# Patient Record
Sex: MALE | Race: WHITE | Employment: OTHER | URBAN - METROPOLITAN AREA
[De-identification: names, ages, dates, MRNs, and addresses within clinical notes are randomized per-mention and may not be internally consistent; named-entity substitution may affect disease eponyms.]

---

## 2017-01-20 ENCOUNTER — OFFICE VISIT (OUTPATIENT)
Dept: CARDIOLOGY CLINIC | Age: 82
End: 2017-01-20

## 2017-01-20 VITALS
WEIGHT: 134 LBS | HEIGHT: 67 IN | HEART RATE: 70 BPM | DIASTOLIC BLOOD PRESSURE: 62 MMHG | SYSTOLIC BLOOD PRESSURE: 130 MMHG | BODY MASS INDEX: 21.03 KG/M2

## 2017-01-20 DIAGNOSIS — I25.10 CORONARY ARTERY DISEASE INVOLVING NATIVE CORONARY ARTERY OF NATIVE HEART WITHOUT ANGINA PECTORIS: Primary | Chronic | ICD-10-CM

## 2017-01-20 DIAGNOSIS — R06.02 SOB (SHORTNESS OF BREATH): ICD-10-CM

## 2017-01-20 DIAGNOSIS — E78.00 PURE HYPERCHOLESTEROLEMIA: Chronic | ICD-10-CM

## 2017-01-20 NOTE — PROGRESS NOTES
Maria De Jesus Rojo MD. [facility]              Patient: Justin Pham  : 8/15/1923      Today's Date: 2017          HISTORY OF PRESENT ILLNESS:     History of Present Illness:  Mr. Narcisa Palma is here for follow-up. He has had a balance problem at times since October and was asked to use cane by PCP. Because of this, he is walking less. He has fallen down, but denies any syncope. No CP. Chronic MATA. Denies dizziness. Home BP looks good. PAST MEDICAL HISTORY:     Past Medical History   Diagnosis Date    BPH (benign prostatic hyperplasia) 2010    CAD (coronary artery disease)     [facility]ot[facility] 2011    Esophageal reflux 2010    Gastritis 2010    Hiatal hernia 2010    IBS (irritable bowel syndrome)     Melanoma ([facility] 2010    Pure hypercholesterolemia 2010    S/P CABG (coronary artery bypass graft)     Tooth fracture          Past Surgical History   Procedure Laterality Date    Hx heent       melanoma surgery x2    Pr abdomen surgery proc unlisted       hernia, right inguinal    Hx heart catheterization        Left ventricular wall motion is mild hypokinesis of the anterior wall. Ejection Fraction is estimated at 55%. The Coronary Angiography revealed:. LAD 80% stenosis. OM1 80% stenosis. RCA >90% stenosis.  Hx coronary artery bypass graft       LIMA to LAD, SVG to diagonal, SVG to trifurcation vessel and SVG to obtuse marginal.     Echo 2d adult       normal LV wall motion and ejection fraction, left atrial enlargement, mild aortic regurgitation, mild to moderate mitral regurgitation and mild tricuspid regurgitation. The ejection fraction was 55-60%.      Stress test - gxt  2011     WNL    Duplex carotid bilateral  2011     Mild bilateral disease    Hx other surgical       Treadmill stress test 12 - walked 6:43, no chest pain, (8.0 METS); stopped for SOB; no ischemic EKG changes, frequent PVC's including in couplets. Also one brief run of NSVT (7 beats) during stage 2.     Hx other surgical       Carotid dopplers 7/12  show 10-49% stenosis bilat.  Hx other surgical       Exercise cardiolite 8/10/12 - walked 7 min without chest pain; EKG with anteroseptal infarct age undetermined; no ischemic EKG changes; short runs (7 beat) of NSVT during exercise. Normal myocardial perfusion and function. LVEF 69%     Hx other surgical       Carotid duplex 6/20/14 - mild 10-49% stenosis bilat            MEDICATIONS:     Current Outpatient Prescriptions   Medication Sig Dispense Refill    oxybutynin (DITROPAN) 5 mg tablet       finasteride (PROSCAR) 5 mg tablet Take 1 Tab by mouth daily. 1 Tab 0    furosemide (LASIX) 20 mg tablet Take 0.5 Tabs by mouth as needed. Take half a tablet as needed for weight gain of 3 lbs in one day or 5 lbs in a week. 10 Tab 3    simvastatin (ZOCOR) 10 mg tablet Take 1 Tab by mouth nightly. 90 Tab 3    colestipol (COLESTID) 1 gram tablet TAKE ONE TABLET BY MOUTH TWICE DAILY 60 Tab 11    silodosin (RAPAFLO) 8 mg capsule Take 1 Cap by mouth nightly. 90 Cap 5    atenolol (TENORMIN) 25 mg tablet Take 0.5 Tabs by mouth daily. 45 Tab 3    food supplemt, lactose-reduced (BOOST HIGH PROTEIN) liqd Take 237 mL by mouth three (3) times daily.  albuterol (PROAIR HFA) 90 mcg/actuation inhaler Take 1 Puff by inhalation every four (4) hours as needed for Wheezing (or coughing spasms). 1 Inhaler 2    NITROSTAT 0.4 mg SL tablet PLACE 1 TABLET SUBLINGUAL EVERY 5 MINUTES AS NEEDED 25 Tab 1    LACTOBACILLUS RHAMNOSUS GG (PROBIOTIC PO) Take  by mouth daily.  Cholecalciferol, Vitamin D3, (VITAMIN D) 1,000 unit Cap Take  by mouth daily.  aspirin delayed-release 81 mg tablet Take 81 mg by mouth daily.  cyanocobalamin (VITAMIN B-12) 1,000 mcg tablet Take 1,000 mcg by mouth daily.          No Known Allergies        SOCIAL HISTORY:     Social History   Substance Use Topics    Smoking status: Never Smoker    Smokeless tobacco: Never Used    Alcohol use 0.0 oz/week     0 Standard drinks or equivalent per week      Comment: Occasionally         FAMILY HISTORY:     Family History   Problem Relation Age of Onset    Heart Disease Mother     Heart Disease Father           REVIEW OF SYSTEMS:        Review of Systems:    Constitutional: Negative for fever, chills    HEENT: Negative for nosebleeds    Respiratory: No SOB  Cardiovascular: Negative for syncope, orthopnea, and PND.    Gastrointestinal: Negative for melena.    Genitourinary: Negative for dysuria    Musculoskeletal: Negative for myalgias.    Skin: Negative for rash    Heme: No problems bleeding.    Neurological: Negative for speech change and focal weakness.    + IBS          PHYSICAL EXAM:        Physical Exam:   Visit Vitals    /62    Pulse 70    Ht 5' 7\" (1.702 m)    Wt 134 lb (60.8 kg)    BMI 20.99 kg/m2      Patient appears generally well, mood and affect are appropriate and pleasant.    HEENT: Normocephalic, atraumatic. Sclera anicteric. Hearing intact  Neck Exam: Supple, no JVD sitting up. + left neck bruit    Lung Exam: Clear to auscultation, even breath sounds.    Cardiac Exam: Regular rate and rhythm with no murmur    Abdomen: Soft, non-tender, normal bowel sounds.   Extremities: no lower extremity edema.  MAW.    Psych - appropriate affect  Neuro - non focal             LABS / OTHER STUDIES:        Lab Results   Component Value Date/Time    Sodium 143 12/12/2016 08:50 AM    Potassium 4.2 12/12/2016 08:50 AM    Chloride 103 12/12/2016 08:50 AM    CO2 23 12/12/2016 08:50 AM    Anion gap 9 09/14/2010 08:42 AM    Glucose 93 12/12/2016 08:50 AM    BUN 18 12/12/2016 08:50 AM    Creatinine 0.79 12/12/2016 08:50 AM    BUN/Creatinine ratio 23 12/12/2016 08:50 AM    GFR est AA 89 12/12/2016 08:50 AM    GFR est non-AA 77 12/12/2016 08:50 AM    Calcium 9.0 12/12/2016 08:50 AM    Bilirubin, total 0.3 12/12/2016 08:50 AM    ALT 9 12/12/2016 08:50 AM    AST 14 12/12/2016 08:50 AM    Alk. phosphatase 86 12/12/2016 08:50 AM    Protein, total 6.7 12/12/2016 08:50 AM    Albumin 3.9 12/12/2016 08:50 AM    Globulin 3.4 09/14/2010 08:42 AM    A-G Ratio 1.4 12/12/2016 08:50 AM     Lab Results   Component Value Date/Time    WBC 6.5 12/12/2016 08:50 AM    HGB 11.4 12/12/2016 08:50 AM    HCT 34.7 12/12/2016 08:50 AM    PLATELET 626 28/94/8443 08:50 AM    MCV 94 12/12/2016 08:50 AM     Lab Results   Component Value Date/Time    Cholesterol, total 131 12/12/2016 08:50 AM    HDL Cholesterol 41 12/12/2016 08:50 AM    LDL, calculated 61 12/12/2016 08:50 AM    VLDL, calculated 29 12/12/2016 08:50 AM    Triglyceride 145 12/12/2016 08:50 AM    CHOL/HDL Ratio 3.7 09/14/2010 08:42 AM     Lab Results   Component Value Date/Time    TSH 5.110 04/07/2016 08:24 AM                 CARDIAC DIAGNOSTICS:        Cardiac Evaluation Includes:  EKG 6/20/14 - sinus bradycardia, 1st degree AV block ()    EKG 1/8/15 - NSR, 1st degree AVB, PRWP, PVC  EKG 1/12/16 - NSR, 1st degree AV block, old anterior infarct   EKG 1/20/17 - NSR, 1st degree AVB, possible old septal infarct      Carotid Doppler 6/14 - 10-49% stenosis bilat    Holter 7/14 - NSR, some PAC and PVC's  Echo 12/3/15 - LVEF 65 %. Grade 1 diastolic dysfunction. RV size upper normal. Mild LAE. Mild-mod MR. Mild AI. Mod TR. RVSP 47.       CXR 11/23/15 - IMPRESSION: Mild pulmonary interstitial edema. No evidence of pneumonia.            ASSESSMENT AND PLAN:        Assessment and Plan:    1) Prior Cough and abnormal CXR (Mild pulmonary interstitial edema. No evidence of pneumonia).    - Besides a cough which is getting better, he says he feet OK. Denies orthopnea.    - Echo 12/3/15 - LVEF 65 %. Grade 1 diastolic dysfunction. RV size upper normal. Mild LAE. Mild-mod MR. Mild AI.  Mod TR. RVSP 47.    - He was taking lasix just once every 7 days (he could not tolerate higher dose due to dizziness) in past  - On 7/14/16, he is no longer taking an lasix. I asked him to take lasix PRN.   - On 1/20/17 he says he is doing better and says he coughs infrequently. He has not taken lasix recently.    2) CAD -    - Mr. Osiel Justin denies any anginal complaints    - Continue medical management of CAD      3) Mild Carotid disease  - Carotid duplex 6/20/14 - mild 10-49% stenosis bilat    - on a statin and ASA     4) Dyslipidemia  - LDL acceptable on very low dose simvastatin   - Mr. Osiel Justin says he is tolerating the statin     5) HTN    - BP OK on low dose atenolol   - continue meds      6) RTC in 6 months.  Patient expressed understanding of the plan - questions were answered.       On a side note he was in the [employer]el during [facility] serving in [address] [place] [address] [place], MD, [place] [address] [place] [address], [address]  [address] [place] [address] Drive.  [address], [address] [place] [address]  Ph: [phone]   Ph [phone]

## 2017-01-20 NOTE — PATIENT INSTRUCTIONS

## 2017-02-20 DIAGNOSIS — I25.10 CORONARY ARTERY DISEASE INVOLVING NATIVE CORONARY ARTERY OF NATIVE HEART WITHOUT ANGINA PECTORIS: Chronic | ICD-10-CM

## 2017-02-20 RX ORDER — ATENOLOL 25 MG/1
12.5 TABLET ORAL DAILY
Qty: 45 TAB | Refills: 3 | Status: SHIPPED | OUTPATIENT
Start: 2017-02-20 | End: 2017-02-23

## 2017-02-23 DIAGNOSIS — I25.10 CORONARY ARTERY DISEASE INVOLVING NATIVE CORONARY ARTERY OF NATIVE HEART WITHOUT ANGINA PECTORIS: Chronic | ICD-10-CM

## 2017-02-23 RX ORDER — ATENOLOL 25 MG/1
TABLET ORAL
Qty: 45 TAB | Refills: 3 | Status: SHIPPED | OUTPATIENT
Start: 2017-02-23 | End: 2018-02-23 | Stop reason: SDUPTHER

## 2017-04-12 ENCOUNTER — OFFICE VISIT (OUTPATIENT)
Dept: FAMILY MEDICINE CLINIC | Age: 82
End: 2017-04-12

## 2017-04-12 VITALS
TEMPERATURE: 96.2 F | DIASTOLIC BLOOD PRESSURE: 64 MMHG | BODY MASS INDEX: 20.59 KG/M2 | RESPIRATION RATE: 22 BRPM | WEIGHT: 131.2 LBS | SYSTOLIC BLOOD PRESSURE: 136 MMHG | HEIGHT: 67 IN | HEART RATE: 68 BPM | OXYGEN SATURATION: 98 %

## 2017-04-12 DIAGNOSIS — K21.9 GASTROESOPHAGEAL REFLUX DISEASE WITHOUT ESOPHAGITIS: Chronic | ICD-10-CM

## 2017-04-12 DIAGNOSIS — K44.9 HIATAL HERNIA: Chronic | ICD-10-CM

## 2017-04-12 DIAGNOSIS — D64.9 ANEMIA, UNSPECIFIED TYPE: Chronic | ICD-10-CM

## 2017-04-12 DIAGNOSIS — D64.9 CHRONIC ANEMIA: Chronic | ICD-10-CM

## 2017-04-12 DIAGNOSIS — N13.9 URINARY OBSTRUCTION, UNSPECIFIED: Chronic | ICD-10-CM

## 2017-04-12 DIAGNOSIS — I25.10 CORONARY ARTERY DISEASE INVOLVING NATIVE CORONARY ARTERY OF NATIVE HEART WITHOUT ANGINA PECTORIS: Primary | Chronic | ICD-10-CM

## 2017-04-12 DIAGNOSIS — E78.00 PURE HYPERCHOLESTEROLEMIA: Chronic | ICD-10-CM

## 2017-04-12 NOTE — PROGRESS NOTES
Progress Note    Patient: Noah Simmonds MRN: [id number]  SSN: [id number]    YOB: 1923  Age: 80 y.o. Sex: male        Chief Complaint   Patient presents with    Cholesterol Problem   [facility]    Annual Wellness Visit     due         Subjective:     Encounter Diagnoses   Name Primary?  Coronary artery disease involving native coronary artery of native heart without angina pectoris: No chest pain, MATA or SOB. No PND or orthopnea. Yes    Gastroesophageal reflux disease without esophagitis:  Current control of Symptoms:good  Hiatal Hernia:no  Current Medications: As needed treatment  The patient has no history melena or bright red blood in the stools. The patient avoids high dose aspirin and NSAID therapy. The patient is aware of diet changes needed, elevating the head of the bed and appropriate use of antacids.  Pure hypercholesterolemia:  Cardiovascular risks for him are: LDL goal is under 80  hypertension  hyperlipidemia. Currently he takes Zocor (simvastatin) , 10 mg. Lab Results   Component Value Date/Time    Cholesterol, total 131 12/12/2016 08:50 AM    HDL Cholesterol 41 12/12/2016 08:50 AM    LDL, calculated 61 12/12/2016 08:50 AM    Triglyceride 145 12/12/2016 08:50 AM    CHOL/HDL Ratio 3.7 09/14/2010 08:42 AM     Lab Results   Component Value Date/Time    ALT (SGPT) 9 12/12/2016 08:50 AM    AST (SGOT) 14 12/12/2016 08:50 AM    Alk. phosphatase 86 12/12/2016 08:50 AM    Bilirubin, total 0.3 12/12/2016 08:50 AM      Myalgias: No   Fatigue: No   Other side effects: no  Wt Readings from Last 3 Encounters:   04/12/17 131 lb 3.2 oz (59.5 kg)   01/20/17 134 lb (60.8 kg)   12/12/16 137 lb (62.1 kg)     The patient is aware of our goal to reduce or eliminate the long term problems (such as strokes and heart attacks) related to poorly controlled hyperlipidemia.  Anemia, unspecified type:ACD vs MDS  No sx.   Lab Results   Component Value Date/Time    HGB 11.4 12/12/2016 08:50 AM     Lab Results   Component Value Date/Time    Iron 48 11/09/2015 10:07 AM    TIBC 293 11/09/2015 10:07 AM    Iron % saturation 16 11/09/2015 10:07 AM    Ferritin 104 05/17/2013 08:07 AM          Hiatal hernia: controlled sx       Chronic anemia: see above       Urinary obstruction, unspecified: He is seeing Dr. Loki Phelps for urinary retention. He had an ultrasound done at [facility] this week. He is to see Dr. Loki Phelps next week. Current and past medical information:    Current Medications after this visit[de-identified]   Current Outpatient Prescriptions   Medication Sig    atenolol (TENORMIN) 25 mg tablet TAKE 1/2 TABLET DAILY    oxybutynin (DITROPAN) 5 mg tablet     simvastatin (ZOCOR) 10 mg tablet Take 1 Tab by mouth nightly.  colestipol (COLESTID) 1 gram tablet TAKE ONE TABLET BY MOUTH TWICE DAILY    silodosin (RAPAFLO) 8 mg capsule Take 1 Cap by mouth nightly.  food supplemt, lactose-reduced (BOOST HIGH PROTEIN) liqd Take 237 mL by mouth three (3) times daily.  albuterol (PROAIR HFA) 90 mcg/actuation inhaler Take 1 Puff by inhalation every four (4) hours as needed for Wheezing (or coughing spasms).  NITROSTAT 0.4 mg SL tablet PLACE 1 TABLET SUBLINGUAL EVERY 5 MINUTES AS NEEDED    LACTOBACILLUS RHAMNOSUS GG (PROBIOTIC PO) Take  by mouth daily.  Cholecalciferol, Vitamin D3, (VITAMIN D) 1,000 unit Cap Take  by mouth daily.  aspirin delayed-release 81 mg tablet Take 81 mg by mouth daily.  cyanocobalamin (VITAMIN B-12) 1,000 mcg tablet Take 1,000 mcg by mouth daily.  finasteride (PROSCAR) 5 mg tablet Take 1 Tab by mouth daily.  furosemide (LASIX) 20 mg tablet Take 0.5 Tabs by mouth as needed. Take half a tablet as needed for weight gain of 3 lbs in one day or 5 lbs in a week. No current facility-administered medications for this visit.         Patient Active Problem List    Diagnosis Date Noted    PAC (premature atrial contraction) 07/29/2014    Chronic anemia 02/05/2014    GERD (gastroesophageal reflux disease) 05/17/2012    Carotid artery disease ([facility] [address] 07/19/2011    Anemia 09/15/2010    Pure hypercholesterolemia 05/12/2010    Esophageal reflux 05/12/2010    BPH (benign prostatic hyperplasia) 05/12/2010    Gastritis 05/12/2010    CAD (coronary artery disease) 05/12/2010    Hiatal hernia 05/12/2010    Melanoma ([facility] [address] 05/12/2010       Past Medical History:   Diagnosis Date    BPH (benign prostatic hyperplasia) 5/12/2010    CAD (coronary artery disease)     Carotid artery disease ([facility] [address] 7/19/2011    Esophageal reflux 5/12/2010    Gastritis 5/12/2010    Hiatal hernia 5/12/2010    IBS (irritable bowel syndrome)     [facility]12[facility]    Pure hypercholesterolemia 5/12/2010    S/P CABG (coronary artery bypass graft)     Tooth fracture 2/14       No Known Allergies    Past Surgical History:   Procedure Laterality Date    ABDOMEN SURGERY PROC UNLISTED  1990    hernia, right inguinal    DUPLEX CAROTID BILATERAL  7/2011    Mild bilateral disease    ECHO 2D ADULT  2010    normal LV wall motion and ejection fraction, left atrial enlargement, mild aortic regurgitation, mild to moderate mitral regurgitation and mild tricuspid regurgitation. The ejection fraction was 55-60%.  HX CORONARY ARTERY BYPASS GRAFT  1995    LIMA to LAD, SVG to diagonal, SVG to trifurcation vessel and SVG to obtuse marginal.     HX HEART CATHETERIZATION  1995     Left ventricular wall motion is mild hypokinesis of the anterior wall. Ejection Fraction is estimated at 55%. The Coronary Angiography revealed:. LAD 80% stenosis. OM1 80% stenosis. RCA >90% stenosis.  HX HEENT      melanoma surgery x2    HX OTHER SURGICAL      Treadmill stress test 7/26/12 - walked 6:43, no chest pain, (8.0 METS); stopped for SOB; no ischemic EKG changes, frequent PVC's including in couplets.  Also one brief run of NSVT (7 beats) during stage 2.     HX OTHER SURGICAL      Carotid dopplers 7/12 show 10-49% stenosis bilat.  HX OTHER SURGICAL      Exercise cardiolite 8/10/12 - walked 7 min without chest pain; EKG with anteroseptal infarct age undetermined; no ischemic EKG changes; short runs (7 beat) of NSVT during exercise. Normal myocardial perfusion and function. LVEF 69%     HX OTHER SURGICAL      Carotid duplex 6/20/14 - mild 10-49% stenosis bilat     STRESS TEST - GXT  7/2011    WNL       Social History     Social History    Marital status:      Spouse name: N/A    Number of children: N/A    Years of education: N/A     Social History Main Topics    Smoking status: Never Smoker    Smokeless tobacco: Never Used    Alcohol use 0.0 oz/week     0 Standard drinks or equivalent per week      Comment: Occasionally    Drug use: No    Sexual activity: Not Asked     Other Topics Concern    None     Social History Narrative       Review of Systems   Constitutional: Negative. Negative for chills, fever, malaise/fatigue and weight loss. He continues to decline. Is walking with a cane with very short shuffling steps. HENT: Negative. Negative for hearing loss. Eyes: Negative. Negative for blurred vision and double vision. Respiratory: Negative. Negative for cough, hemoptysis, sputum production and shortness of breath. Cardiovascular: Negative. Negative for chest pain, palpitations and orthopnea. Gastrointestinal: Negative. Negative for abdominal pain, blood in stool, heartburn, nausea and vomiting. Genitourinary: Negative. Negative for dysuria, frequency and urgency. Urinary frequency and incomplete bladder emptying by history   Musculoskeletal: Negative. Negative for back pain, myalgias and neck pain. Skin: Negative. Negative for rash. Neurological: Negative. Negative for dizziness, tingling, tremors, weakness and headaches. Endo/Heme/Allergies: Negative. Psychiatric/Behavioral: Negative. Negative for depression.         Objective:     Vitals: 04/12/17 0811 04/12/17 0816 04/12/17 0835 04/12/17 0836   BP: 166/69 156/58 155/66 136/64   Pulse: 68 68     Resp: 22      Temp: 96.2 °F (35.7 °C)      TempSrc: Oral      SpO2: 98%      Weight: 131 lb 3.2 oz (59.5 kg)      Height: 5' 7\" (1.702 m)         Body mass index is 20.55 kg/(m^2). Physical Exam   Constitutional: He is oriented to person, place, and time and well-developed, well-nourished, and in no distress. HENT:   Head: Normocephalic and atraumatic. Mouth/Throat: Oropharynx is clear and moist.   Decreased hearing   Eyes: Right eye exhibits no discharge. Left eye exhibits no discharge. No scleral icterus. Neck: No tracheal deviation present. No thyromegaly present. No bruit. Cardiovascular: Normal rate, regular rhythm and normal heart sounds. Pulmonary/Chest: Effort normal and breath sounds normal.   Abdominal: Soft. Neurological: He is alert and oriented to person, place, and time. Abnormal gait as described   Skin: No rash noted. No erythema. Psychiatric: Mood and affect normal.   Nursing note and vitals reviewed. Health Maintenance Due   Topic Date Due    MEDICARE YEARLY EXAM  04/07/2017         Assessment and orders:       ICD-10-CM ICD-9-CM    1. Coronary artery disease involving native coronary artery of native heart without angina pectoris-stable   S56.58 644.25 METABOLIC PANEL, COMPREHENSIVE      LIPID PANEL   2. Gastroesophageal reflux disease without esophagitis-stable    K21.9 530.81 CBC W/O DIFF   3. Pure hypercholesterolemia-retest  T88.19 948.1 METABOLIC PANEL, COMPREHENSIVE      LIPID PANEL   4. Anemia, unspecified type-retest  D64.9 285.9 CBC W/O DIFF      IRON PROFILE   5. Hiatal hernia-no symptoms    K44.9 553.3    6. Chronic anemia-stable    D64.9 285.9 CBC W/O DIFF   7. Urinary obstruction, unspecified-new problem  N13.9 599.60          Plan of care:  Discussed diagnoses in detail with patient.      Medication risks/benefits/side effects discussed with patient. All of the patient's questions were addressed. The patient understands and agrees with our plan of care. The patient knows to call back if they are unsure of or forget any changes we discussed today or if the symptoms change. The patient received an After-Visit Summary which contains VS, orders, medication list and allergy list. This can be used as a \"mini-medical record\" should they have to seek medical care while out of town. Patient Care Team:  Ellis Smith MD as PCP - General  Freddie Cordon MD as Physician (Dermatology)  Bridgette Mayes MD as Physician (Gastroenterology)  Kate Rangel MD (Cardiology)  Mukul Cross MD (Urology)    Follow-up Disposition:  Return for . due for medicare wellness visit, -Due for a Medicare Wellness Visit.     Future Appointments  Date Time Provider Taisha More   7/21/2017 10:40 AM Debbie Mendoza MD [address] [place] [address]       Signed By: Ellis Smith MD     April 12, 2017

## 2017-04-12 NOTE — MR AVS SNAPSHOT
Visit Information Date & Time Provider Department Dept. Phone Encounter #  
 4/12/2017  8:00 AM King Simmons MD [address] [place] [address] [phone] [id number] Follow-up Instructions Return for . due for medicare wellness visit. Your Appointments 7/21/2017 10:40 AM  
ESTABLISHED PATIENT with Pepe Ruelas MD  
[facility] ([facility]) Appt Note: 6 mo fup  
 [address] [facility] 600 1007 [facility]  
54 [facility] [address] [address] Upcoming Health Maintenance Date Due  
 MEDICARE YEARLY EXAM 4/7/2017 GLAUCOMA SCREENING Q2Y 12/8/2018 DTaP/Tdap/Td series (2 - Td) 9/13/2026 Allergies as of 4/12/2017  Review Complete On: 4/12/2017 By: Judith Suárez No Known Allergies Current Immunizations  Reviewed on 12/12/2016 Name Date Influenza Vaccine 10/7/2015, 10/14/2014, 9/27/2013 Influenza Vaccine (Quad) PF 9/12/2016 Influenza Vaccine Split 10/19/2011, 10/19/2005 PPD 5/5/2009 Pneumococcal Conjugate (PCV-13) 4/30/2015 Pneumococcal Vaccine (Unspecified Type) 4/2/2010, 12/1/2000 TD Vaccine 8/25/2010, 5/15/2006, 10/22/2002 Tdap 9/13/2016 Not reviewed this visit You Were Diagnosed With   
  
 Codes Comments Coronary artery disease involving native coronary artery of native heart without angina pectoris    -  Primary ICD-10-CM: I25.10 ICD-9-CM: 414.01 Gastroesophageal reflux disease without esophagitis     ICD-10-CM: K21.9 ICD-9-CM: 530.81 Pure hypercholesterolemia     ICD-10-CM: E78.00 ICD-9-CM: 272.0 Anemia, unspecified type     ICD-10-CM: D64.9 ICD-9-CM: 285.9 Hiatal hernia     ICD-10-CM: K44.9 ICD-9-CM: 195. 3 Chronic anemia     ICD-10-CM: D64.9 ICD-9-CM: 285.9 Urinary obstruction, unspecified     ICD-10-CM: N13.9 ICD-9-CM: 599.60 Vitals BP Pulse Temp Resp Height(growth percentile) Weight(growth percentile) 156/58 68 96.2 °F (35.7 °C) (Oral) 22 5' 7\" (1.702 m) 131 lb 3.2 oz (59.5 kg) SpO2 BMI Smoking Status 98% 20.55 kg/m2 Never Smoker Vitals History BMI and BSA Data Body Mass Index Body Surface Area 20.55 kg/m 2 1.68 m 2 Preferred Pharmacy Pharmacy Name Phone [facility] [phone] Your Updated Medication List  
  
   
This list is accurate as of: 4/12/17  8:28 AM.  Always use your most recent med list.  
  
  
  
  
 albuterol 90 mcg/actuation inhaler Commonly known as:  PROAIR HFA Take 1 Puff by inhalation every four (4) hours as needed for Wheezing (or coughing spasms). aspirin delayed-release 81 mg tablet Take 81 mg by mouth daily. atenolol 25 mg tablet Commonly known as:  TENORMIN  
TAKE 1/2 TABLET DAILY  
  
 BOOST HIGH PROTEIN Liqd Generic drug:  food supplemt, lactose-reduced Take 237 mL by mouth three (3) times daily. colestipol 1 gram tablet Commonly known as:  COLESTID  
TAKE ONE TABLET BY MOUTH TWICE DAILY  
  
 finasteride 5 mg tablet Commonly known as:  PROSCAR Take 1 Tab by mouth daily. furosemide 20 mg tablet Commonly known as:  LASIX Take 0.5 Tabs by mouth as needed. Take half a tablet as needed for weight gain of 3 lbs in one day or 5 lbs in a week. NITROSTAT 0.4 mg SL tablet Generic drug:  nitroglycerin PLACE 1 TABLET SUBLINGUAL EVERY 5 MINUTES AS NEEDED  
  
 oxybutynin 5 mg tablet Commonly known as:  DITROPAN  
  
 PROBIOTIC PO Take  by mouth daily. silodosin 8 mg capsule Commonly known as:  RAPAFLO Take 1 Cap by mouth nightly. simvastatin 10 mg tablet Commonly known as:  ZOCOR Take 1 Tab by mouth nightly. VITAMIN B-12 1,000 mcg tablet Generic drug:  cyanocobalamin Take 1,000 mcg by mouth daily. VITAMIN D3 1,000 unit Cap Generic drug:  cholecalciferol Take  by mouth daily. We Performed the Following CBC W/O DIFF [90201 CPT(R)] IRON PROFILE [id number] CPT(R)] LIPID PANEL [70148 CPT(R)] METABOLIC PANEL, COMPREHENSIVE [71197 CPT(R)] Follow-up Instructions Return for . due for medicare wellness visit. Patient Instructions Learning About Coronary Artery Disease (CAD) What is coronary artery disease? Coronary artery disease (CAD) occurs when plaque builds up in the arteries that bring oxygen-rich blood to your heart. Plaque is a fatty substance made of cholesterol, calcium, and other substances in the blood. This process is called hardening of the arteries, or atherosclerosis. What happens when you have coronary artery disease? · Plaque may narrow the coronary arteries. Narrowed arteries cause poor blood flow. This can lead to angina symptoms such as chest pain or discomfort. If blood flow is completely blocked, you could have a heart attack. · You can slow CAD and reduce the risk of future problems by making changes in your lifestyle. These include quitting smoking and eating heart-healthy foods. · Treatments for CAD, along with changes in your lifestyle, can help you live a longer and healthier life. How can you prevent coronary artery disease? · Do not smoke. It may be the best thing you can do to prevent heart disease. If you need help quitting, talk to your doctor about stop-smoking programs and medicines. These can increase your chances of quitting for good. · Be active. Get at least 30 minutes of exercise on most days of the week. Walking is a good choice. You also may want to do other activities, such as running, swimming, cycling, or playing tennis or team sports. · Eat heart-healthy foods. Eat more fruits and vegetables and less foods that contain saturated and trans fats. Limit alcohol, sodium, and sweets. · Stay at a healthy weight. Lose weight if you need to. · Manage other health problems such as diabetes, high blood pressure, and high cholesterol. · Manage stress. Stress can hurt your heart. To keep stress low, talk about your problems and feelings. Don't keep your feelings hidden. · If you have talked about it with your doctor, take a low-dose aspirin every day. Aspirin can help certain people lower their risk of a heart attack or stroke. But taking aspirin isn't right for everyone, because it can cause serious bleeding. Do not start taking daily aspirin unless your doctor knows about it. How is coronary artery disease treated? · Your doctor will suggest that you make lifestyle changes. For example, your doctor may ask you to eat healthy foods, quit smoking, lose extra weight, and be more active. · You will have to take medicines. · Your doctor may suggest a procedure to open narrowed or blocked arteries. This is called angioplasty. Or your doctor may suggest using healthy blood vessels to create detours around narrowed or blocked arteries. This is called bypass surgery. Follow-up care is a key part of your treatment and safety. Be sure to make and go to all appointments, and call your doctor if you are having problems. It's also a good idea to know your test results and keep a list of the medicines you take. Where can you learn more? Go to http://vipin-josé miguel.info/. Enter (44) 0240 6817 in the search box to learn more about \"Learning About Coronary Artery Disease (CAD). \" Current as of: January 27, 2016 Content Version: 11.2 © 3915-4533 [employer]. Care instructions adapted under license by [employer] (which disclaims liability or warranty for this information). If you have questions about a medical condition or this instruction, always ask your healthcare professional. [place] [address] any warranty or liability for your use of this information. Please provide this summary of care documentation to your next provider. Your primary care clinician is listed as [address]. If you have any questions after today's visit, please call [phone].

## 2017-04-12 NOTE — PATIENT INSTRUCTIONS
Learning About Coronary Artery Disease (CAD)  What is coronary artery disease? Coronary artery disease (CAD) occurs when plaque builds up in the arteries that bring oxygen-rich blood to your heart. Plaque is a fatty substance made of cholesterol, calcium, and other substances in the blood. This process is called hardening of the arteries, or atherosclerosis. What happens when you have coronary artery disease? · Plaque may narrow the coronary arteries. Narrowed arteries cause poor blood flow. This can lead to angina symptoms such as chest pain or discomfort. If blood flow is completely blocked, you could have a heart attack. · You can slow CAD and reduce the risk of future problems by making changes in your lifestyle. These include quitting smoking and eating heart-healthy foods. · Treatments for CAD, along with changes in your lifestyle, can help you live a longer and healthier life. How can you prevent coronary artery disease? · Do not smoke. It may be the best thing you can do to prevent heart disease. If you need help quitting, talk to your doctor about stop-smoking programs and medicines. These can increase your chances of quitting for good. · Be active. Get at least 30 minutes of exercise on most days of the week. Walking is a good choice. You also may want to do other activities, such as running, swimming, cycling, or playing tennis or team sports. · Eat heart-healthy foods. Eat more fruits and vegetables and less foods that contain saturated and trans fats. Limit alcohol, sodium, and sweets. · Stay at a healthy weight. Lose weight if you need to. · Manage other health problems such as diabetes, high blood pressure, and high cholesterol. · Manage stress. Stress can hurt your heart. To keep stress low, talk about your problems and feelings. Don't keep your feelings hidden. · If you have talked about it with your doctor, take a low-dose aspirin every day.  Aspirin can help certain people lower their risk of a heart attack or stroke. But taking aspirin isn't right for everyone, because it can cause serious bleeding. Do not start taking daily aspirin unless your doctor knows about it. How is coronary artery disease treated? · Your doctor will suggest that you make lifestyle changes. For example, your doctor may ask you to eat healthy foods, quit smoking, lose extra weight, and be more active. · You will have to take medicines. · Your doctor may suggest a procedure to open narrowed or blocked arteries. This is called angioplasty. Or your doctor may suggest using healthy blood vessels to create detours around narrowed or blocked arteries. This is called bypass surgery. Follow-up care is a key part of your treatment and safety. Be sure to make and go to all appointments, and call your doctor if you are having problems. It's also a good idea to know your test results and keep a list of the medicines you take. Where can you learn more? Go to http://vipin-josé miguel.info/. Enter ([phone] 0069 3843 in the search box to learn more about \"Learning About Coronary Artery Disease (CAD). \"  Current as of: January 27, 2016  Content Version: 11.2  © 3523-2528 [employer]. Care instructions adapted under license by [employer] (which disclaims liability or warranty for this information). If you have questions about a medical condition or this instruction, always ask your healthcare professional. [place] [address] any warranty or liability for your use of this information.

## 2017-04-13 LAB
ALBUMIN SERPL-MCNC: 3.9 G/DL (ref 3.2–4.6)
ALBUMIN/GLOB SERPL: 1.2 {RATIO} (ref 1.2–2.2)
ALP SERPL-CCNC: 90 IU/L (ref 39–117)
ALT SERPL-CCNC: 11 IU/L (ref 0–44)
AST SERPL-CCNC: 13 IU/L (ref 0–40)
BILIRUB SERPL-MCNC: 0.4 MG/DL (ref 0–1.2)
BUN SERPL-MCNC: 21 MG/DL (ref 10–36)
BUN/CREAT SERPL: 27 (ref 10–24)
CALCIUM SERPL-MCNC: 9 MG/DL (ref 8.6–10.2)
CHLORIDE SERPL-SCNC: 103 MMOL/L (ref 96–106)
CHOLEST SERPL-MCNC: 124 MG/DL (ref 100–199)
CO2 SERPL-SCNC: 24 MMOL/L (ref 18–29)
CREAT SERPL-MCNC: 0.79 MG/DL (ref 0.76–1.27)
ERYTHROCYTE [DISTWIDTH] IN BLOOD BY AUTOMATED COUNT: 14.3 % (ref 12.3–15.4)
GLOBULIN SER CALC-MCNC: 3.3 G/DL (ref 1.5–4.5)
GLUCOSE SERPL-MCNC: 96 MG/DL (ref 65–99)
HCT VFR BLD AUTO: 34.1 % (ref 37.5–51)
HDLC SERPL-MCNC: 35 MG/DL
HGB BLD-MCNC: 11 G/DL (ref 12.6–17.7)
IRON SATN MFR SERPL: 20 % (ref 15–55)
IRON SERPL-MCNC: 57 UG/DL (ref 38–169)
LDLC SERPL CALC-MCNC: 55 MG/DL (ref 0–99)
MCH RBC QN AUTO: 30.5 PG (ref 26.6–33)
MCHC RBC AUTO-ENTMCNC: 32.3 G/DL (ref 31.5–35.7)
MCV RBC AUTO: 95 FL (ref 79–97)
PLATELET # BLD AUTO: 228 X10E3/UL (ref 150–379)
POTASSIUM SERPL-SCNC: 3.9 MMOL/L (ref 3.5–5.2)
PROT SERPL-MCNC: 7.2 G/DL (ref 6–8.5)
RBC # BLD AUTO: 3.61 X10E6/UL (ref 4.14–5.8)
SODIUM SERPL-SCNC: 143 MMOL/L (ref 134–144)
TIBC SERPL-MCNC: 287 UG/DL (ref 250–450)
TRIGL SERPL-MCNC: 171 MG/DL (ref 0–149)
UIBC SERPL-MCNC: 230 UG/DL (ref 111–343)
VLDLC SERPL CALC-MCNC: 34 MG/DL (ref 5–40)
WBC # BLD AUTO: 6 X10E3/UL (ref 3.4–10.8)

## 2017-05-15 DIAGNOSIS — N40.1 BENIGN PROSTATIC HYPERPLASIA WITH LOWER URINARY TRACT SYMPTOMS, UNSPECIFIED MORPHOLOGY: Chronic | ICD-10-CM

## 2017-05-15 RX ORDER — SILODOSIN 8 MG/1
8 CAPSULE ORAL
Qty: 90 CAP | Refills: 2 | Status: SHIPPED | OUTPATIENT
Start: 2017-05-15 | End: 2018-03-07 | Stop reason: SDUPTHER

## 2017-05-15 RX ORDER — OXYBUTYNIN CHLORIDE 5 MG/1
5 TABLET ORAL 2 TIMES DAILY
Qty: 60 TAB | Refills: 5 | Status: SHIPPED | OUTPATIENT
Start: 2017-05-15 | End: 2018-05-03 | Stop reason: SDUPTHER

## 2017-05-15 NOTE — TELEPHONE ENCOUNTER
Patient requesting refills on the two medications listed    He uses the [facility] mail order    Thanks    Ihsan Reza

## 2017-07-03 DIAGNOSIS — I25.10 CORONARY ARTERY DISEASE INVOLVING NATIVE CORONARY ARTERY OF NATIVE HEART WITHOUT ANGINA PECTORIS: Chronic | ICD-10-CM

## 2017-07-03 RX ORDER — MONTELUKAST SODIUM 4 MG/1
TABLET, CHEWABLE ORAL
Qty: 60 TAB | Refills: 0 | Status: SHIPPED | OUTPATIENT
Start: 2017-07-03 | End: 2017-08-11 | Stop reason: SDUPTHER

## 2017-07-17 DIAGNOSIS — I25.10 CORONARY ARTERY DISEASE INVOLVING NATIVE CORONARY ARTERY OF NATIVE HEART WITHOUT ANGINA PECTORIS: Chronic | ICD-10-CM

## 2017-07-17 RX ORDER — SIMVASTATIN 10 MG/1
10 TABLET, FILM COATED ORAL
Qty: 90 TAB | Refills: 0 | Status: SHIPPED | OUTPATIENT
Start: 2017-07-17 | End: 2017-10-16 | Stop reason: SDUPTHER

## 2017-07-21 ENCOUNTER — OFFICE VISIT (OUTPATIENT)
Dept: CARDIOLOGY CLINIC | Age: 82
End: 2017-07-21

## 2017-07-21 VITALS
BODY MASS INDEX: 20.56 KG/M2 | HEIGHT: 67 IN | HEART RATE: 60 BPM | DIASTOLIC BLOOD PRESSURE: 56 MMHG | SYSTOLIC BLOOD PRESSURE: 136 MMHG | WEIGHT: 131 LBS

## 2017-07-21 DIAGNOSIS — I25.10 CORONARY ARTERY DISEASE INVOLVING NATIVE CORONARY ARTERY OF NATIVE HEART WITHOUT ANGINA PECTORIS: Primary | Chronic | ICD-10-CM

## 2017-07-21 DIAGNOSIS — E78.00 PURE HYPERCHOLESTEROLEMIA: Chronic | ICD-10-CM

## 2017-07-21 DIAGNOSIS — I10 ESSENTIAL HYPERTENSION: ICD-10-CM

## 2017-07-21 NOTE — PROGRESS NOTES
Kary Perez MD. [facility]              Patient: Елена Raymond  : 8/15/1923      Today's Date: 2017          HISTORY OF PRESENT ILLNESS:     History of Present Illness:  Mr. Mayuri Rose is here for follow-up. Outpatient BP looks good. He is doing well. No Cp or SOB or dizziness. He says he has no complaints. PAST MEDICAL HISTORY:     Past Medical History:   Diagnosis Date    BPH (benign prostatic hyperplasia) 2010    CAD (coronary artery disease)     Carotid artery disease ([facility] [address] 2011    Esophageal reflux 2010    Gastritis 2010    Hiatal hernia 2010    IBS (irritable bowel syndrome)     Melanoma ([facility] [address] 2010    Pure hypercholesterolemia 2010    S/P CABG (coronary artery bypass graft)     Tooth fracture        Past Surgical History:   Procedure Laterality Date    ABDOMEN SURGERY PROC UNLISTED      hernia, right inguinal    DUPLEX CAROTID BILATERAL  2011    Mild bilateral disease    ECHO 2D ADULT      normal LV wall motion and ejection fraction, left atrial enlargement, mild aortic regurgitation, mild to moderate mitral regurgitation and mild tricuspid regurgitation. The ejection fraction was 55-60%.  HX CORONARY ARTERY BYPASS GRAFT      LIMA to LAD, SVG to diagonal, SVG to trifurcation vessel and SVG to obtuse marginal.     HX HEART CATHETERIZATION       Left ventricular wall motion is mild hypokinesis of the anterior wall. Ejection Fraction is estimated at 55%. The Coronary Angiography revealed:. LAD 80% stenosis. OM1 80% stenosis. RCA >90% stenosis.  HX HEENT      melanoma surgery x2    HX OTHER SURGICAL      Treadmill stress test 12 - walked 6:43, no chest pain, (8.0 METS); stopped for SOB; no ischemic EKG changes, frequent PVC's including in couplets. Also one brief run of NSVT (7 beats) during stage 2.     HX OTHER SURGICAL      Carotid dopplers   show 10-49% stenosis bilat.      HX OTHER SURGICAL Exercise cardiolite 8/10/12 - walked 7 min without chest pain; EKG with anteroseptal infarct age undetermined; no ischemic EKG changes; short runs (7 beat) of NSVT during exercise. Normal myocardial perfusion and function. LVEF 69%     HX OTHER SURGICAL      Carotid duplex 6/20/14 - mild 10-49% stenosis bilat     STRESS TEST - GXT  7/2011    WNL           MEDICATIONS:     Current Outpatient Prescriptions   Medication Sig Dispense Refill    simvastatin (ZOCOR) 10 mg tablet Take 1 Tab by mouth nightly. 90 Tab 0    colestipol (COLESTID) 1 gram tablet TAKE ONE TABLET BY MOUTH TWICE DAILY 60 Tab 0    oxybutynin (DITROPAN) 5 mg tablet Take 1 Tab by mouth two (2) times a day. 60 Tab 5    silodosin (RAPAFLO) 8 mg capsule Take 1 Cap by mouth nightly. 90 Cap 2    atenolol (TENORMIN) 25 mg tablet TAKE 1/2 TABLET DAILY 45 Tab 3    finasteride (PROSCAR) 5 mg tablet Take 1 Tab by mouth daily. 1 Tab 0    furosemide (LASIX) 20 mg tablet Take 0.5 Tabs by mouth as needed. Take half a tablet as needed for weight gain of 3 lbs in one day or 5 lbs in a week. 10 Tab 3    food supplemt, lactose-reduced (BOOST HIGH PROTEIN) liqd Take 237 mL by mouth three (3) times daily.  NITROSTAT 0.4 mg SL tablet PLACE 1 TABLET SUBLINGUAL EVERY 5 MINUTES AS NEEDED 25 Tab 1    LACTOBACILLUS RHAMNOSUS GG (PROBIOTIC PO) Take  by mouth daily.  Cholecalciferol, Vitamin D3, (VITAMIN D) 1,000 unit Cap Take  by mouth daily.  aspirin delayed-release 81 mg tablet Take 81 mg by mouth daily.  cyanocobalamin (VITAMIN B-12) 1,000 mcg tablet Take 1,000 mcg by mouth daily.  albuterol (PROAIR HFA) 90 mcg/actuation inhaler Take 1 Puff by inhalation every four (4) hours as needed for Wheezing (or coughing spasms).  1 Inhaler 2       No Known Allergies          SOCIAL HISTORY:     Social History   Substance Use Topics    Smoking status: Never Smoker    Smokeless tobacco: Never Used    Alcohol use 0.0 oz/week     0 Standard drinks or equivalent per week      Comment: Occasionally           FAMILY HISTORY:     Family History   Problem Relation Age of Onset    Heart Disease Mother     Heart Disease Father           REVIEW OF SYSTEMS:         Review of Systems:    Constitutional: Negative for fever, chills    HEENT: Negative for nosebleeds    Respiratory: No SOB  Cardiovascular: Negative for syncope, orthopnea, and PND.    Gastrointestinal: Negative for melena.    Genitourinary: Negative for dysuria    Musculoskeletal: Negative for myalgias.    Skin: Negative for rash    Heme: No problems bleeding.    Neurological: Negative for speech change and focal weakness.    + IBS           PHYSICAL EXAM:         Physical Exam:     Visit Vitals    /56    Pulse 60    Ht 5' 7\" (1.702 m)    Wt 131 lb (59.4 kg)    BMI 20.52 kg/m2     Patient appears generally well, mood and affect are appropriate and pleasant.    HEENT: Normocephalic, atraumatic. Sclera anicteric. Hearing intact  Neck Exam: Supple, no JVD sitting up. + left neck bruit    Lung Exam: Clear to auscultation, even breath sounds.    Cardiac Exam: Regular rate and rhythm with no murmur    Abdomen: Soft, non-tender, normal bowel sounds. Extremities: Trace lower extremity edema.  MAW.    Psych - appropriate affect  Neuro - non focal               LABS / OTHER STUDIES:         Lab Results   Component Value Date/Time    Sodium 143 04/12/2017 08:45 AM    Potassium 3.9 04/12/2017 08:45 AM    Chloride 103 04/12/2017 08:45 AM    CO2 24 04/12/2017 08:45 AM    Anion gap 9 09/14/2010 08:42 AM    Glucose 96 04/12/2017 08:45 AM    BUN 21 04/12/2017 08:45 AM    Creatinine 0.79 04/12/2017 08:45 AM    BUN/Creatinine ratio 27 04/12/2017 08:45 AM    GFR est AA 89 04/12/2017 08:45 AM    GFR est non-AA 77 04/12/2017 08:45 AM    Calcium 9.0 04/12/2017 08:45 AM    Bilirubin, total 0.4 04/12/2017 08:45 AM    AST (SGOT) 13 04/12/2017 08:45 AM    Alk.  phosphatase 90 04/12/2017 08:45 AM    Protein, total 7.2 04/12/2017 08:45 AM    Albumin 3.9 04/12/2017 08:45 AM    Globulin 3.4 09/14/2010 08:42 AM    A-G Ratio 1.2 04/12/2017 08:45 AM    ALT (SGPT) 11 04/12/2017 08:45 AM     Lab Results   Component Value Date/Time    WBC 6.0 04/12/2017 08:45 AM    HGB 11.0 04/12/2017 08:45 AM    HCT 34.1 04/12/2017 08:45 AM    PLATELET 070 25/42/5351 08:45 AM    MCV 95 04/12/2017 08:45 AM     Lab Results   Component Value Date/Time    Cholesterol, total 124 04/12/2017 08:45 AM    HDL Cholesterol 35 04/12/2017 08:45 AM    LDL, calculated 55 04/12/2017 08:45 AM    VLDL, calculated 34 04/12/2017 08:45 AM    Triglyceride 171 04/12/2017 08:45 AM    CHOL/HDL Ratio 3.7 09/14/2010 08:42 AM     Lab Results   Component Value Date/Time    TSH 5.110 04/07/2016 08:24 AM               CARDIAC DIAGNOSTICS:         Cardiac Evaluation Includes:  EKG 6/20/14 - sinus bradycardia, 1st degree AV block ()    EKG 1/8/15 - NSR, 1st degree AVB, PRWP, PVC  EKG 1/12/16 - NSR, 1st degree AV block, old anterior infarct   EKG 1/20/17 - NSR, 1st degree AVB, possible old septal infarct       Carotid Doppler 6/14 - 10-49% stenosis bilat    Holter 7/14 - NSR, some PAC and PVC's  Echo 12/3/15 - LVEF 65 %. Grade 1 diastolic dysfunction. RV size upper normal. Mild LAE. Mild-mod MR. Mild AI. Mod TR. RVSP 47.        CXR 11/23/15 - IMPRESSION: Mild pulmonary interstitial edema. No evidence of pneumonia.             ASSESSMENT AND PLAN:         Assessment and Plan:    1) Prior Cough and abnormal CXR (Mild pulmonary interstitial edema. No evidence of pneumonia).    - Besides a cough which is getting better, he says he feet OK. Denies orthopnea.    - Echo 12/3/15 - LVEF 65 %. Grade 1 diastolic dysfunction. RV size upper normal. Mild LAE. Mild-mod MR. Mild AI. Mod TR. RVSP 47.    - He was taking lasix just once every 7 days (he could not tolerate higher dose due to dizziness) in past  - On 7/14/16, he is no longer taking an lasix.  I asked him to take lasix PRN.   - On 1/20/17 he says he is doing better and says he coughs infrequently. He has not taken lasix recently. - On 7/21/17 - he is doing well      2) CAD -    - Mr. Dread Quiñones denies any anginal complaints    - Continue medical management of CAD       3) Mild Carotid disease  - Carotid duplex 6/20/14 - mild 10-49% stenosis bilat    - on a statin and ASA      4) Dyslipidemia  - LDL acceptable on very low dose simvastatin   - Mr. Dread Quiñones says he is tolerating the statin      5) HTN    - BP OK on low dose atenolol   - continue meds       6) RTC in 6 months.  Patient expressed understanding of the plan - questions were answered.       On a side note he was in the [facility] during [facility] serving in [place].         Randolph Santoyo MD, [address], [address]  Ph: [phone]   Ph [phone]

## 2017-07-21 NOTE — MR AVS SNAPSHOT
Visit Information Date & Time Provider Department Dept. Phone Encounter #  
 7/21/2017 10:40 AM Yoly Peter MD [facility] [phone] [id number] Upcoming Health Maintenance Date Due  
 MEDICARE YEARLY EXAM 4/7/2017 INFLUENZA AGE 9 TO ADULT 8/1/2017 GLAUCOMA SCREENING Q2Y 12/8/2018 DTaP/Tdap/Td series (2 - Td) 9/13/2026 Allergies as of 7/21/2017  Review Complete On: 7/21/2017 By: Yoly Peter MD  
 No Known Allergies Current Immunizations  Reviewed on 12/12/2016 Name Date Influenza Vaccine 10/7/2015, 10/14/2014, 9/27/2013 Influenza Vaccine (Quad) PF 9/12/2016 Influenza Vaccine Split 10/19/2011, 10/19/2005 PPD 5/5/2009 Pneumococcal Conjugate (PCV-13) 4/30/2015 Pneumococcal Vaccine (Unspecified Type) 4/2/2010, 12/1/2000 TD Vaccine 8/25/2010, 5/15/2006, 10/22/2002 Tdap 9/13/2016 Not reviewed this visit You Were Diagnosed With   
  
 Codes Comments Coronary artery disease involving native coronary artery of native heart without angina pectoris    -  Primary ICD-10-CM: I25.10 ICD-9-CM: 414.01   
 Pure hypercholesterolemia     ICD-10-CM: E78.00 ICD-9-CM: 272.0 Essential hypertension     ICD-10-CM: I10 
ICD-9-CM: 401.9 Vitals BP Pulse Height(growth percentile) Weight(growth percentile) BMI Smoking Status 136/56 60 5' 7\" (1.702 m) 131 lb (59.4 kg) 20.52 kg/m2 Never Smoker Vitals History BMI and BSA Data Body Mass Index Body Surface Area 20.52 kg/m 2 1.68 m 2 Preferred Pharmacy Pharmacy Name Phone [facility] [phone] Your Updated Medication List  
  
   
This list is accurate as of: 7/21/17 10:50 AM.  Always use your most recent med list.  
  
  
  
  
 albuterol 90 mcg/actuation inhaler Commonly known as:  PROAIR HFA Take 1 Puff by inhalation every four (4) hours as needed for Wheezing (or coughing spasms). aspirin delayed-release 81 mg tablet Take 81 mg by mouth daily. atenolol 25 mg tablet Commonly known as:  TENORMIN  
TAKE 1/2 TABLET DAILY  
  
 BOOST HIGH PROTEIN Liqd Generic drug:  food supplemt, lactose-reduced Take 237 mL by mouth three (3) times daily. colestipol 1 gram tablet Commonly known as:  COLESTID  
TAKE ONE TABLET BY MOUTH TWICE DAILY  
  
 finasteride 5 mg tablet Commonly known as:  PROSCAR Take 1 Tab by mouth daily. furosemide 20 mg tablet Commonly known as:  LASIX Take 0.5 Tabs by mouth as needed. Take half a tablet as needed for weight gain of 3 lbs in one day or 5 lbs in a week. NITROSTAT 0.4 mg SL tablet Generic drug:  nitroglycerin PLACE 1 TABLET SUBLINGUAL EVERY 5 MINUTES AS NEEDED  
  
 oxybutynin 5 mg tablet Commonly known as:  VHNPCPPA Take 1 Tab by mouth two (2) times a day. PROBIOTIC PO Take  by mouth daily. silodosin 8 mg capsule Commonly known as:  RAPAFLO Take 1 Cap by mouth nightly. simvastatin 10 mg tablet Commonly known as:  ZOCOR Take 1 Tab by mouth nightly. VITAMIN B-12 1,000 mcg tablet Generic drug:  cyanocobalamin Take 1,000 mcg by mouth daily. VITAMIN D3 1,000 unit Cap Generic drug:  cholecalciferol Take  by mouth daily. Patient Instructions Heart-Healthy Diet: Care Instructions Your Care Instructions A heart-healthy diet has lots of vegetables, fruits, nuts, beans, and whole grains, and is low in salt. It limits foods that are high in saturated fat, such as meats, cheeses, and fried foods. It may be hard to change your diet, but even small changes can lower your risk of heart attack and heart disease. Follow-up care is a key part of your treatment and safety. Be sure to make and go to all appointments, and call your doctor if you are having problems. It's also a good idea to know your test results and keep a list of the medicines you take. How can you care for yourself at home? Watch your portions · Learn what a serving is. A \"serving\" and a \"portion\" are not always the same thing. Make sure that you are not eating larger portions than are recommended. For example, a serving of pasta is ½ cup. A serving size of meat is 2 to 3 ounces. A 3-ounce serving is about the size of a deck of cards. Measure serving sizes until you are good at Maries" them. Keep in mind that restaurants often serve portions that are 2 or 3 times the size of one serving. · To keep your energy level up and keep you from feeling hungry, eat often but in smaller portions. · Eat only the number of calories you need to stay at a healthy weight. If you need to lose weight, eat fewer calories than your body burns (through exercise and other physical activity). Eat more fruits and vegetables · Eat a variety of fruit and vegetables every day. Dark green, deep orange, red, or yellow fruits and vegetables are especially good for you. Examples include spinach, carrots, peaches, and berries. · Keep carrots, celery, and other veggies handy for snacks. Buy fruit that is in season and store it where you can see it so that you will be tempted to eat it. · Cook dishes that have a lot of veggies in them, such as stir-fries and soups. Limit saturated and trans fat · Read food labels, and try to avoid saturated and trans fats. They increase your risk of heart disease. Trans fat is found in many processed foods such as cookies and crackers. · Use olive or canola oil when you cook. Try cholesterol-lowering spreads, such as Benecol or Take Control. · Bake, broil, grill, or steam foods instead of frying them. · Choose lean meats instead of high-fat meats such as hot dogs and sausages. Cut off all visible fat when you prepare meat. · Eat fish, skinless poultry, and meat alternatives such as soy products instead of high-fat meats.  Soy products, such as tofu, may be especially good for your heart. · Choose low-fat or fat-free milk and dairy products. Eat fish · Eat at least two servings of fish a week. Certain fish, such as salmon and tuna, contain omega-3 fatty acids, which may help reduce your risk of heart attack. Eat foods high in fiber · Eat a variety of grain products every day. Include whole-grain foods that have lots of fiber and nutrients. Examples of whole-grain foods include oats, whole wheat bread, and brown rice. · Buy whole-grain breads and cereals, instead of white bread or pastries. Limit salt and sodium · Limit how much salt and sodium you eat to help lower your blood pressure. · Taste food before you salt it. Add only a little salt when you think you need it. With time, your taste buds will adjust to less salt. · Eat fewer snack items, fast foods, and other high-salt, processed foods. Check food labels for the amount of sodium in packaged foods. · Choose low-sodium versions of canned goods (such as soups, vegetables, and beans). Limit sugar · Limit drinks and foods with added sugar. These include candy, desserts, and soda pop. Limit alcohol · Limit alcohol to no more than 2 drinks a day for men and 1 drink a day for women. Too much alcohol can cause health problems. When should you call for help? Watch closely for changes in your health, and be sure to contact your doctor if: 
· You would like help planning heart-healthy meals. Where can you learn more? Go to http://vipin-josé miguel.info/. Enter V137 in the search box to learn more about \"Heart-Healthy Diet: Care Instructions. \" Current as of: April 3, 2017 Content Version: 11.3 © 0826-7763 [employer]. Care instructions adapted under license by [employer] (which disclaims liability or warranty for this information).  If you have questions about a medical condition or this instruction, always ask your healthcare professional. [employer] disclaims any warranty or liability for your use of this information. Please provide this summary of care documentation to your next provider. Your primary care clinician is listed as [address]. If you have any questions after today's visit, please call [phone].

## 2017-07-21 NOTE — PATIENT INSTRUCTIONS

## 2017-08-29 ENCOUNTER — OFFICE VISIT (OUTPATIENT)
Dept: FAMILY MEDICINE CLINIC | Age: 82
End: 2017-08-29

## 2017-08-29 VITALS
SYSTOLIC BLOOD PRESSURE: 133 MMHG | WEIGHT: 128.2 LBS | RESPIRATION RATE: 16 BRPM | HEART RATE: 74 BPM | HEIGHT: 67 IN | BODY MASS INDEX: 20.12 KG/M2 | OXYGEN SATURATION: 97 % | TEMPERATURE: 97.5 F | DIASTOLIC BLOOD PRESSURE: 54 MMHG

## 2017-08-29 DIAGNOSIS — Z23 ENCOUNTER FOR IMMUNIZATION: ICD-10-CM

## 2017-08-29 DIAGNOSIS — D64.9 CHRONIC ANEMIA: Chronic | ICD-10-CM

## 2017-08-29 DIAGNOSIS — K21.9 GASTROESOPHAGEAL REFLUX DISEASE WITHOUT ESOPHAGITIS: Chronic | ICD-10-CM

## 2017-08-29 DIAGNOSIS — K44.9 HIATAL HERNIA: Chronic | ICD-10-CM

## 2017-08-29 DIAGNOSIS — E78.00 PURE HYPERCHOLESTEROLEMIA: Primary | Chronic | ICD-10-CM

## 2017-08-29 DIAGNOSIS — I25.10 CORONARY ARTERY DISEASE INVOLVING NATIVE CORONARY ARTERY OF NATIVE HEART WITHOUT ANGINA PECTORIS: Chronic | ICD-10-CM

## 2017-08-29 RX ORDER — BISACODYL 5 MG
5 TABLET, DELAYED RELEASE (ENTERIC COATED) ORAL DAILY PRN
COMMUNITY
End: 2017-09-15

## 2017-08-29 NOTE — PROGRESS NOTES
Chief Complaint   Patient presents with    Constipation    Labs     Body mass index is 20.08 kg/(m^2).     Reviewed record in preparation for visit and have necessary documentation  Pt did not bring medication to office visit for review  Information was given to pt on Advanced Directives, Living Will  Information was given on Shingles Vaccine  Opportunity was given for questions  Goals that were addressed and/or need to be completed after this appointment include:     Health Maintenance Due   Topic Date Due    MEDICARE YEARLY EXAM  04/07/2017    INFLUENZA AGE 9 TO ADULT  08/01/2017

## 2017-08-29 NOTE — MR AVS SNAPSHOT
Visit Information Date & Time Provider Department Dept. Phone Encounter #  
 8/29/2017  1:25 PM Harshil Kulkarni MD [facility] [id number] Follow-up Instructions Return in about 4 months (around 12/29/2017). Your Appointments 1/25/2018 11:40 AM  
ESTABLISHED PATIENT with Manuel Milligan MD  
[facility] ([facility] SCHEDULING) Appt Note: 6 mo fup  
 [address] [facility] [address] [facility]  
[address] [facility] [id number] [place] [address] Upcoming Health Maintenance Date Due  
 MEDICARE YEARLY EXAM 4/7/2017 INFLUENZA AGE 9 TO ADULT 8/1/2017 GLAUCOMA SCREENING Q2Y 12/8/2018 DTaP/Tdap/Td series (2 - Td) 9/13/2026 Allergies as of 8/29/2017  Review Complete On: 7/21/2017 By: Manuel Milligan MD  
 No Known Allergies Current Immunizations  Reviewed on 12/12/2016 Name Date Influenza Vaccine 10/7/2015, 10/14/2014, 9/27/2013 Influenza Vaccine (Quad) PF 9/12/2016 Influenza Vaccine Split 10/19/2011, 10/19/2005 PPD 5/5/2009 Pneumococcal Conjugate (PCV-13) 4/30/2015 TD Vaccine 8/25/2010, 5/15/2006, 10/22/2002 Tdap 9/13/2016 ZZZ-RETIRED (DO NOT USE) Pneumococcal Vaccine (Unspecified Type) 4/2/2010, 12/1/2000 Not reviewed this visit You Were Diagnosed With   
  
 Codes Comments Pure hypercholesterolemia    -  Primary ICD-10-CM: E78.00 ICD-9-CM: 272.0 Gastroesophageal reflux disease without esophagitis     ICD-10-CM: K21.9 ICD-9-CM: 530.81 Chronic anemia     ICD-10-CM: D64.9 ICD-9-CM: 285.9 Coronary artery disease involving native coronary artery of native heart without angina pectoris     ICD-10-CM: I25.10 ICD-9-CM: 414.01 Hiatal hernia     ICD-10-CM: K44.9 ICD-9-CM: 901. 3 Vitals BP Pulse Temp Resp Height(growth percentile) Weight(growth percentile) 133/54 74 97.5 °F (36.4 °C) (Oral) 16 5' 7\" (1.702 m) 128 lb 3.2 oz (58.2 kg) SpO2 BMI Smoking Status 97% 20.08 kg/m2 Never Smoker Vitals History BMI and BSA Data Body Mass Index Body Surface Area 20.08 kg/m 2 1.66 m 2 Preferred Pharmacy Pharmacy Name Phone [facility] [address] [phone] Your Updated Medication List  
  
   
This list is accurate as of: 8/29/17  2:06 PM.  Always use your most recent med list.  
  
  
  
  
 albuterol 90 mcg/actuation inhaler Commonly known as:  PROAIR HFA Take 1 Puff by inhalation every four (4) hours as needed for Wheezing (or coughing spasms). aspirin delayed-release 81 mg tablet Take 81 mg by mouth daily. atenolol 25 mg tablet Commonly known as:  TENORMIN  
TAKE 1/2 TABLET DAILY  
  
 BOOST HIGH PROTEIN Liqd Generic drug:  food supplemt, lactose-reduced Take 237 mL by mouth three (3) times daily. colestipol 1 gram tablet Commonly known as:  COLESTID  
TAKE ONE TABLET BY MOUTH TWICE DAILY DULCOLAX (BISACODYL) 5 mg EC tablet Generic drug:  bisacodyl Take 5 mg by mouth daily as needed for Constipation. finasteride 5 mg tablet Commonly known as:  PROSCAR Take 1 Tab by mouth daily. furosemide 20 mg tablet Commonly known as:  LASIX Take 0.5 Tabs by mouth as needed. Take half a tablet as needed for weight gain of 3 lbs in one day or 5 lbs in a week. NITROSTAT 0.4 mg SL tablet Generic drug:  nitroglycerin PLACE 1 TABLET SUBLINGUAL EVERY 5 MINUTES AS NEEDED  
  
 oxybutynin 5 mg tablet Commonly known as:  MHQMNBHN Take 1 Tab by mouth two (2) times a day. PROBIOTIC PO Take  by mouth daily. sennosides 15 mg chewable tablet Commonly known as:  EX-LAX Take  by mouth.  
  
 silodosin 8 mg capsule Commonly known as:  RAPAFLO Take 1 Cap by mouth nightly. simvastatin 10 mg tablet Commonly known as:  ZOCOR Take 1 Tab by mouth nightly. VITAMIN B-12 1,000 mcg tablet Generic drug:  cyanocobalamin Take 1,000 mcg by mouth daily. VITAMIN D3 1,000 unit Cap Generic drug:  cholecalciferol Take  by mouth daily. We Performed the Following CBC WITH AUTOMATED DIFF [41255 CPT(R)] LIPID PANEL [52121 CPT(R)] METABOLIC PANEL, COMPREHENSIVE [21713 CPT(R)] Follow-up Instructions Return in about 4 months (around 12/29/2017). Patient Instructions Constipation treatment strategy: Avoid popcorn and other constipating foods, increase fruits and vegetables, prunes if you like them. Drink plenty of water. If this has failed: 
 
1-Docusate 100 mg -2 capsules twice daily. 2- Then Docusate plus Citrucel 1 scoop in 8 oz of water  daily. 3-Then add Miralax 1 scoop in 8 oz of water every 2-3  days. 4- Call me if this does not work. Influenza (Flu) Vaccine (Inactivated or Recombinant): What You Need to Know Why get vaccinated? Influenza (\"flu\") is a contagious disease that spreads around the [place] every winter, usually between October and May. Flu is caused by influenza viruses and is spread mainly by coughing, sneezing, and close contact. Anyone can get flu. Flu strikes suddenly and can last several days. Symptoms vary by age, but can include: · Fever/chills. · Sore throat. · Muscle aches. · Fatigue. · Cough. · Headache. · Runny or stuffy nose. Flu can also lead to pneumonia and blood infections, and cause diarrhea and seizures in children. If you have a medical condition, such as heart or lung disease, flu can make it worse. Flu is more dangerous for some people. Infants and young children, people 72years of age and older, pregnant women, and people with certain health conditions or a weakened immune system are at greatest risk.  
Each year thousands of people in the [facility] die from flu, and many more are hospitalized. Flu vaccine can: · Keep you from getting flu. · Make flu less severe if you do get it. · Keep you from spreading flu to your family and other people. Inactivated and recombinant flu vaccines A dose of flu vaccine is recommended every flu season. Children 6 months through 6years of age may need two doses during the same flu season. Everyone else needs only one dose each flu season. Some inactivated flu vaccines contain a very small amount of a mercury-based preservative called thimerosal. Studies have not shown thimerosal in vaccines to be harmful, but flu vaccines that do not contain thimerosal are available. There is no live flu virus in flu shots. They cannot cause the flu. There are many flu viruses, and they are always changing. Each year a new flu vaccine is made to protect against three or four viruses that are likely to cause disease in the upcoming flu season. But even when the vaccine doesn't exactly match these viruses, it may still provide some protection. Flu vaccine cannot prevent: · Flu that is caused by a virus not covered by the vaccine. · Illnesses that look like flu but are not. Some people should not get this vaccine Tell the person who is giving you the vaccine: · If you have any severe (life-threatening) allergies. If you ever had a life-threatening allergic reaction after a dose of flu vaccine, or have a severe allergy to any part of this vaccine, you may be advised not to get vaccinated. Most, but not all, types of flu vaccine contain a small amount of egg protein. · If you ever had Guillain-Barré syndrome (also called GBS) Some people with a history of GBS should not get this vaccine. This should be discussed with your doctor. · If you are not feeling well. It is usually okay to get flu vaccine when you have a mild illness, but you might be asked to come back when you feel better. Risks of a vaccine reaction With any medicine, including vaccines, there is a chance of reactions. These are usually mild and go away on their own, but serious reactions are also possible. Most people who get a flu shot do not have any problems with it. Minor problems following a flu shot include: · Soreness, redness, or swelling where the shot was given · Hoarseness · Sore, red or itchy eyes · Cough · Fever · Aches · Headache · Itching · Fatigue If these problems occur, they usually begin soon after the shot and last 1 or 2 days. More serious problems following a flu shot can include the following: · There may be a small increased risk of Guillain-Barré Syndrome (GBS) after inactivated flu vaccine. This risk has been estimated at 1 or 2 additional cases per million people vaccinated. This is much lower than the risk of severe complications from flu, which can be prevented by flu vaccine. · Jihan Harrell children who get the flu shot along with pneumococcal vaccine (PCV13) and/or DTaP vaccine at the same time might be slightly more likely to have a seizure caused by fever. Ask your doctor for more information. Tell your doctor if a child who is getting flu vaccine has ever had a seizure Problems that could happen after any injected vaccine: · People sometimes faint after a medical procedure, including vaccination. Sitting or lying down for about 15 minutes can help prevent fainting, and injuries caused by a fall. Tell your doctor if you feel dizzy, or have vision changes or ringing in the ears. · Some people get severe pain in the shoulder and have difficulty moving the arm where a shot was given. This happens very rarely. · Any medication can cause a severe allergic reaction. Such reactions from a vaccine are very rare, estimated at about 1 in a million doses, and would happen within a few minutes to a few hours after the vaccination.  
As with any medicine, there is a very remote chance of a vaccine causing a serious injury or death. The safety of vaccines is always being monitored. For more information, visit: www.cdc.gov/vaccinesafety/. What if there is a serious reaction? What should I look for? · Look for anything that concerns you, such as signs of a severe allergic reaction, very high fever, or unusual behavior. Signs of a severe allergic reaction can include hives, swelling of the face and throat, difficulty breathing, a fast heartbeat, dizziness, and weakness  usually within a few minutes to a few hours after the vaccination. What should I do? · If you think it is a severe allergic reaction or other emergency that can't wait, call 9-1-1 and get the person to the nearest hospital. Otherwise, call your doctor. · Reactions should be reported to the \"Vaccine Adverse Event Reporting System\" (VAERS). Your doctor should file this report, or you can do it yourself through the VAERS website at www.vaers. [facility].gov, or by calling 3-840.733.4617. [employer] does not give medical advice. The National Vaccine Injury Compensation Program 
The National Vaccine Injury Compensation Program (VICP) is a federal program that was created to compensate people who may have been injured by certain vaccines. Persons who believe they may have been injured by a vaccine can learn about the program and about filing a claim by calling 4-439.789.8167 or visiting the [employer] website at www.[facility].gov/vaccinecompensation. There is a time limit to file a claim for compensation. How can I learn more? · Ask your healthcare provider. He or she can give you the vaccine package insert or suggest other sources of information. · Call your local or state health department. · Contact the Centers for Disease Control and Prevention (CDC): 
¨ Call 0-110.810.3307 (1-800-CDC-INFO) or ¨ Visit CDC's website at www.cdc.gov/flu Vaccine Information Statement Inactivated Influenza Vaccine 8/7/2015) 42 GERTRUDE Connors 650ES-14 Department of Health and [place] Online [employer] Centers for Disease Control and Prevention Many Vaccine Information Statements are available in [place] and other languages. See www.immunize.org/vis. Muchas hojas de información sobre vacunas están disponibles en español y en otros idiomas. Visite www.immunize.org/vis. Care instructions adapted under license by [employer] (which disclaims liability or warranty for this information). If you have questions about a medical condition or this instruction, always ask your healthcare professional. [address] any warranty or liability for your use of this information. Please provide this summary of care documentation to your next provider. Your primary care clinician is listed as [address]. If you have any questions after today's visit, please call [phone].

## 2017-08-29 NOTE — PATIENT INSTRUCTIONS
Constipation treatment strategy:   Avoid popcorn and other constipating foods, increase fruits and vegetables, prunes if you like them. Drink plenty of water. If this has failed:    1-Docusate 100 mg -2 capsules twice daily. 2- Then Docusate plus Citrucel 1 scoop in 8 oz of water  daily. 3-Then add Miralax 1 scoop in 8 oz of water every 2-3  days. 4- Call me if this does not work. Influenza (Flu) Vaccine (Inactivated or Recombinant): What You Need to Know  Why get vaccinated? Influenza (\"flu\") is a contagious disease that spreads around the [facility] every winter, usually between October and May. Flu is caused by influenza viruses and is spread mainly by coughing, sneezing, and close contact. Anyone can get flu. Flu strikes suddenly and can last several days. Symptoms vary by age, but can include:  · Fever/chills. · Sore throat. · Muscle aches. · Fatigue. · Cough. · Headache. · Runny or stuffy nose. Flu can also lead to pneumonia and blood infections, and cause diarrhea and seizures in children. If you have a medical condition, such as heart or lung disease, flu can make it worse. Flu is more dangerous for some people. Infants and young children, people 72years of age and older, pregnant women, and people with certain health conditions or a weakened immune system are at greatest risk. Each year thousands of people in the [facility] die from flu, and many more are hospitalized. Flu vaccine can:  · Keep you from getting flu. · Make flu less severe if you do get it. · Keep you from spreading flu to your family and other people. Inactivated and recombinant flu vaccines  A dose of flu vaccine is recommended every flu season. Children 6 months through 6years of age may need two doses during the same flu season. Everyone else needs only one dose each flu season.   Some inactivated flu vaccines contain a very small amount of a mercury-based preservative called thimerosal. Studies have not shown thimerosal in vaccines to be harmful, but flu vaccines that do not contain thimerosal are available. There is no live flu virus in flu shots. They cannot cause the flu. There are many flu viruses, and they are always changing. Each year a new flu vaccine is made to protect against three or four viruses that are likely to cause disease in the upcoming flu season. But even when the vaccine doesn't exactly match these viruses, it may still provide some protection. Flu vaccine cannot prevent:  · Flu that is caused by a virus not covered by the vaccine. · Illnesses that look like flu but are not. Some people should not get this vaccine  Tell the person who is giving you the vaccine:  · If you have any severe (life-threatening) allergies. If you ever had a life-threatening allergic reaction after a dose of flu vaccine, or have a severe allergy to any part of this vaccine, you may be advised not to get vaccinated. Most, but not all, types of flu vaccine contain a small amount of egg protein. · If you ever had Guillain-Barré syndrome (also called GBS) Some people with a history of GBS should not get this vaccine. This should be discussed with your doctor. · If you are not feeling well. It is usually okay to get flu vaccine when you have a mild illness, but you might be asked to come back when you feel better. Risks of a vaccine reaction  With any medicine, including vaccines, there is a chance of reactions. These are usually mild and go away on their own, but serious reactions are also possible. Most people who get a flu shot do not have any problems with it. Minor problems following a flu shot include:  · Soreness, redness, or swelling where the shot was given  · Hoarseness  · Sore, red or itchy eyes  · Cough  · Fever  · Aches  · Headache  · Itching  · Fatigue  If these problems occur, they usually begin soon after the shot and last 1 or 2 days.   More serious problems following a flu shot can include the following:  · There may be a small increased risk of Guillain-Barré Syndrome (GBS) after inactivated flu vaccine. This risk has been estimated at 1 or 2 additional cases per million people vaccinated. This is much lower than the risk of severe complications from flu, which can be prevented by flu vaccine. · Teresia Krysten children who get the flu shot along with pneumococcal vaccine (PCV13) and/or DTaP vaccine at the same time might be slightly more likely to have a seizure caused by fever. Ask your doctor for more information. Tell your doctor if a child who is getting flu vaccine has ever had a seizure  Problems that could happen after any injected vaccine:  · People sometimes faint after a medical procedure, including vaccination. Sitting or lying down for about 15 minutes can help prevent fainting, and injuries caused by a fall. Tell your doctor if you feel dizzy, or have vision changes or ringing in the ears. · Some people get severe pain in the shoulder and have difficulty moving the arm where a shot was given. This happens very rarely. · Any medication can cause a severe allergic reaction. Such reactions from a vaccine are very rare, estimated at about 1 in a million doses, and would happen within a few minutes to a few hours after the vaccination. As with any medicine, there is a very remote chance of a vaccine causing a serious injury or death. The safety of vaccines is always being monitored. For more information, visit: www.cdc.gov/vaccinesafety/. What if there is a serious reaction? What should I look for? · Look for anything that concerns you, such as signs of a severe allergic reaction, very high fever, or unusual behavior. Signs of a severe allergic reaction can include hives, swelling of the face and throat, difficulty breathing, a fast heartbeat, dizziness, and weakness - usually within a few minutes to a few hours after the vaccination. What should I do?   · If you think it is a severe allergic reaction or other emergency that can't wait, call 9-1-1 and get the person to the nearest hospital. Otherwise, call your doctor. · Reactions should be reported to the \"Vaccine Adverse Event Reporting System\" (VAERS). Your doctor should file this report, or you can do it yourself through the VAERS website at www.vaers. [facility].gov, or by calling 0-320.567.8966. VAERS does not give medical advice. The National Vaccine Injury Compensation Program  The National Vaccine Injury Compensation Program (VICP) is a federal program that was created to compensate people who may have been injured by certain vaccines. Persons who believe they may have been injured by a vaccine can learn about the program and about filing a claim by calling 3-272.952.7396 or visiting the [employer] website at www.[facility].gov/vaccinecompensation. There is a time limit to file a claim for compensation. How can I learn more? · Ask your healthcare provider. He or she can give you the vaccine package insert or suggest other sources of information. · Call your local or state health department. · Contact the Centers for Disease Control and Prevention (CDC):  ¨ Call 1-606.999.5948 (1-800-CDC-INFO) or  ¨ Visit CDC's website at www.cdc.gov/flu  Vaccine Information Statement  Inactivated Influenza Vaccine  8/7/2015)  [address] [place] 982GR-73  Department of Health and Human Services  Centers for Disease Control and Prevention  Many Vaccine Information Statements are available in Hebrew and other languages. See www.immunize.org/vis. Muchas hojas de información sobre vacunas están disponibles en español y en otros idiomas. Visite www.immunize.org/vis. Care instructions adapted under license by [employer] (which disclaims liability or warranty for this information).  If you have questions about a medical condition or this instruction, always ask your healthcare professional. [address] any warranty or liability for your use of this information.

## 2017-08-30 NOTE — PROGRESS NOTES
Progress Note    Patient: Dunia Santos MRN: [id number]  SSN: [id number]    YOB: 1923  Age: 80 y.o. Sex: male        Chief Complaint   Patient presents with    Constipation    Labs         Subjective:     Encounter Diagnoses   Name Primary?  Pure hypercholesterolemia:  Cardiovascular risks for him are: LDL goal is under 80. Currently he takes Zocor (simvastatin) , 10 mg  Lab Results   Component Value Date/Time    Cholesterol, total 124 04/12/2017 08:45 AM    HDL Cholesterol 35 04/12/2017 08:45 AM    LDL, calculated 55 04/12/2017 08:45 AM    Triglyceride 171 04/12/2017 08:45 AM    CHOL/HDL Ratio 3.7 09/14/2010 08:42 AM     Lab Results   Component Value Date/Time    ALT (SGPT) 11 04/12/2017 08:45 AM    AST (SGOT) 13 04/12/2017 08:45 AM    Alk. phosphatase 90 04/12/2017 08:45 AM    Bilirubin, total 0.4 04/12/2017 08:45 AM      Myalgias: No   Fatigue: No   Other side effects: no  Wt Readings from Last 3 Encounters:   08/29/17 128 lb 3.2 oz (58.2 kg)   07/21/17 131 lb (59.4 kg)   04/12/17 131 lb 3.2 oz (59.5 kg)     The patient is aware of our goal to reduce or eliminate the long term problems (such as strokes and heart attacks) related to poorly controlled hyperlipidemia. Yes    Gastroesophageal reflux disease without esophagitis:  Current control of Symptoms:good  Hiatal Hernia:no  Current Medications:Probiotics  The patient has no history melena or bright red blood in the stools. The patient avoids high dose aspirin and NSAID therapy. The patient is aware of diet changes needed, elevating the head of the bed and appropriate use of antacids.  Chronic anemia:  No sx.   Lab Results   Component Value Date/Time    HGB 11.0 04/12/2017 08:45 AM     Lab Results   Component Value Date/Time    Iron 57 04/12/2017 08:45 AM    TIBC 287 04/12/2017 08:45 AM    Iron % saturation 20 04/12/2017 08:45 AM    Ferritin 104 05/17/2013 08:07 AM          Coronary artery disease involving native coronary artery of native heart without angina pectoris: Stable with no new symptoms. his exercise tolerance has dramatically decreased in the last year.  Hiatal hernia: Symptoms controlled         Encounter for immunization:  Flu shot given              Current and past medical information:    Current Medications after this visit[de-identified]   Current Outpatient Prescriptions   Medication Sig    sennosides (EX-LAX) 15 mg chewable tablet Take  by mouth.  bisacodyl (DULCOLAX, BISACODYL,) 5 mg EC tablet Take 5 mg by mouth daily as needed for Constipation.  colestipol (COLESTID) 1 gram tablet TAKE ONE TABLET BY MOUTH TWICE DAILY    simvastatin (ZOCOR) 10 mg tablet Take 1 Tab by mouth nightly.  oxybutynin (DITROPAN) 5 mg tablet Take 1 Tab by mouth two (2) times a day.  silodosin (RAPAFLO) 8 mg capsule Take 1 Cap by mouth nightly.  atenolol (TENORMIN) 25 mg tablet TAKE 1/2 TABLET DAILY    finasteride (PROSCAR) 5 mg tablet Take 1 Tab by mouth daily.  furosemide (LASIX) 20 mg tablet Take 0.5 Tabs by mouth as needed. Take half a tablet as needed for weight gain of 3 lbs in one day or 5 lbs in a week.  food supplemt, lactose-reduced (BOOST HIGH PROTEIN) liqd Take 237 mL by mouth three (3) times daily.  albuterol (PROAIR HFA) 90 mcg/actuation inhaler Take 1 Puff by inhalation every four (4) hours as needed for Wheezing (or coughing spasms).  NITROSTAT 0.4 mg SL tablet PLACE 1 TABLET SUBLINGUAL EVERY 5 MINUTES AS NEEDED    LACTOBACILLUS RHAMNOSUS GG (PROBIOTIC PO) Take  by mouth daily.  Cholecalciferol, Vitamin D3, (VITAMIN D) 1,000 unit Cap Take  by mouth daily.  aspirin delayed-release 81 mg tablet Take 81 mg by mouth daily.  cyanocobalamin (VITAMIN B-12) 1,000 mcg tablet Take 1,000 mcg by mouth daily. No current facility-administered medications for this visit.         Patient Active Problem List    Diagnosis Date Noted    PAC (premature atrial contraction) 07/29/2014    Chronic anemia 02/05/2014    GERD (gastroesophageal reflux disease) 05/17/2012    Carotid artery disease ([address] 07/19/2011    Anemia 09/15/2010    Pure hypercholesterolemia 05/12/2010    Esophageal reflux 05/12/2010    BPH (benign prostatic hyperplasia) 05/12/2010    Gastritis 05/12/2010    CAD (coronary artery disease) 05/12/2010    Hiatal hernia 05/12/2010    Melanoma ([address] 05/12/2010       Past Medical History:   Diagnosis Date    BPH (benign prostatic hyperplasia) 5/12/2010    CAD (coronary artery disease)     Carotid artery disease ([address] 7/19/2011    Esophageal reflux 5/12/2010    Gastritis 5/12/2010    Hiatal hernia 5/12/2010    IBS (irritable bowel syndrome)     [facility] 5/12/2010    Pure hypercholesterolemia 5/12/2010    S/P CABG (coronary artery bypass graft)     Tooth fracture 2/14       No Known Allergies    Past Surgical History:   Procedure Laterality Date    ABDOMEN SURGERY PROC UNLISTED  1990    hernia, right inguinal    DUPLEX CAROTID BILATERAL  7/2011    Mild bilateral disease    ECHO 2D ADULT  2010    normal LV wall motion and ejection fraction, left atrial enlargement, mild aortic regurgitation, mild to moderate mitral regurgitation and mild tricuspid regurgitation. The ejection fraction was 55-60%.  HX CORONARY ARTERY BYPASS GRAFT  1995    LIMA to LAD, SVG to diagonal, SVG to trifurcation vessel and SVG to obtuse marginal.     HX HEART CATHETERIZATION  1995     Left ventricular wall motion is mild hypokinesis of the anterior wall. Ejection Fraction is estimated at 55%. The Coronary Angiography revealed:. LAD 80% stenosis. OM1 80% stenosis. RCA >90% stenosis.  HX HEENT      melanoma surgery x2    HX OTHER SURGICAL      Treadmill stress test 7/26/12 - walked 6:43, no chest pain, (8.0 METS); stopped for SOB; no ischemic EKG changes, frequent PVC's including in couplets.  Also one brief run of NSVT (7 beats) during stage 2.     HX OTHER SURGICAL      Carotid dopplers 7/12  show 10-49% stenosis bilat.  HX OTHER SURGICAL      Exercise cardiolite 8/10/12 - walked 7 min without chest pain; EKG with anteroseptal infarct age undetermined; no ischemic EKG changes; short runs (7 beat) of NSVT during exercise. Normal myocardial perfusion and function. LVEF 69%     HX OTHER SURGICAL      Carotid duplex 6/20/14 - mild 10-49% stenosis bilat     STRESS TEST - GXT  7/2011    WNL       Social History     Social History    Marital status:      Spouse name: N/A    Number of children: N/A    Years of education: N/A     Social History Main Topics    Smoking status: Never Smoker    Smokeless tobacco: Never Used    Alcohol use 0.0 oz/week     0 Standard drinks or equivalent per week      Comment: Occasionally    Drug use: No    Sexual activity: Not Asked     Other Topics Concern    None     Social History Narrative       Review of Systems   Constitutional: Positive for malaise/fatigue. Negative for chills, fever and weight loss. HENT: Negative. Negative for hearing loss. Eyes: Negative. Negative for blurred vision and double vision. Respiratory: Positive for shortness of breath. Negative for cough, hemoptysis and sputum production. Cardiovascular: Negative. Negative for chest pain, palpitations and orthopnea. Gastrointestinal: Positive for constipation. Negative for abdominal pain, blood in stool, heartburn, nausea and vomiting. Dr. Carlos Rojo told him his colon was worn out. He has been using Ex-Lax which is a stimulant laxative. He was told to stop this. Genitourinary: Negative. Negative for dysuria, frequency and urgency. Musculoskeletal: Negative. Negative for back pain, myalgias and neck pain. Skin: Negative. Negative for rash. Neurological: Positive for weakness. Negative for dizziness, tingling, tremors and headaches. Endo/Heme/Allergies: Negative. Psychiatric/Behavioral: Negative. Negative for depression.         Objective:     Vitals: 08/29/17 1329 08/29/17 1334   BP: 150/63 133/54   Pulse: 74    Resp: 16    Temp: 97.5 °F (36.4 °C)    TempSrc: Oral    SpO2: 97%    Weight: 128 lb 3.2 oz (58.2 kg)    Height: 5' 7\" (1.702 m)       Body mass index is 20.08 kg/(m^2). Physical Exam   Constitutional: He is oriented to person, place, and time and well-developed, well-nourished, and in no distress. Frail looking with short spaced gait. HENT:   Head: Normocephalic and atraumatic. Mouth/Throat: Oropharynx is clear and moist.   Eyes: Right eye exhibits no discharge. Left eye exhibits no discharge. No scleral icterus. Neck: No tracheal deviation present. No thyromegaly present. No bruit. Cardiovascular: Normal rate, regular rhythm and normal heart sounds. Pulmonary/Chest: Effort normal and breath sounds normal.   Abdominal: Soft. Musculoskeletal: He exhibits no tenderness. Neurological: He is alert and oriented to person, place, and time. Skin: No rash noted. No erythema. Psychiatric: Mood and affect normal.   Nursing note and vitals reviewed. Health Maintenance Due   Topic Date Due    MEDICARE YEARLY EXAM  04/07/2017    INFLUENZA AGE 9 TO ADULT  08/01/2017         Assessment and orders:       ICD-10-CM ICD-9-CM    1. Pure hypercholesterolemia-Retest E78.00 272.0 LIPID PANEL      METABOLIC PANEL, COMPREHENSIVE      CBC WITH AUTOMATED DIFF   2. Gastroesophageal reflux disease without esophagitis-controlled   K21.9 530.81 CBC WITH AUTOMATED DIFF   3. Chronic anemia-Retest   D64.9 285.9    4. Coronary artery disease involving native coronary artery of native heart without angina pectoris-No symptoms I25.10 414.01 LIPID PANEL      METABOLIC PANEL, COMPREHENSIVE   5. Hiatal hernia-No symptoms   K44.9 553.3 CBC WITH AUTOMATED DIFF   6.  Encounter for immunization Z23 V03.89 ADMIN INFLUENZA VIRUS VAC      INFLUENZA VIRUS VACCINE, HIGH DOSE SEASONAL, PRESERVATIVE FREE         Plan of care:  Discussed diagnoses in detail with patient. Medication risks/benefits/side effects discussed with patient. All of the patient's questions were addressed. The patient understands and agrees with our plan of care. The patient knows to call back if they are unsure of or forget any changes we discussed today or if the symptoms change. The patient received an After-Visit Summary which contains VS, orders, medication list and allergy list. This can be used as a \"mini-medical record\" should they have to seek medical care while out of town. Patient Care Team:  Myranda Hill MD as PCP - General  Christie Reeves MD as Physician (Dermatology)  Dawit Mulligan MD as Physician (Gastroenterology)  Robert Rey MD (Cardiology)  Summer Boyle MD (Urology)    Follow-up Disposition:  Return in about 4 months (around 12/29/2017).     Future Appointments  Date Time Provider Taisha Aguero   12/29/2017 8:40 AM Myranda Hill MD [facility] SCHED   1/25/2018 11:40 AM Keshia Theodore MD [address] [place] [address]       Signed By: Myranda Hill MD     August 29, 2017

## 2017-08-31 LAB
ALBUMIN SERPL-MCNC: 3.8 G/DL (ref 3.2–4.6)
ALBUMIN/GLOB SERPL: 1.2 {RATIO} (ref 1.2–2.2)
ALP SERPL-CCNC: 93 IU/L (ref 39–117)
ALT SERPL-CCNC: 10 IU/L (ref 0–44)
AST SERPL-CCNC: 14 IU/L (ref 0–40)
BASOPHILS # BLD AUTO: 0 X10E3/UL (ref 0–0.2)
BASOPHILS NFR BLD AUTO: 0 %
BILIRUB SERPL-MCNC: 0.4 MG/DL (ref 0–1.2)
BUN SERPL-MCNC: 19 MG/DL (ref 10–36)
BUN/CREAT SERPL: 26 (ref 10–24)
CALCIUM SERPL-MCNC: 8.9 MG/DL (ref 8.6–10.2)
CHLORIDE SERPL-SCNC: 102 MMOL/L (ref 96–106)
CHOLEST SERPL-MCNC: 110 MG/DL (ref 100–199)
CO2 SERPL-SCNC: 27 MMOL/L (ref 18–29)
CREAT SERPL-MCNC: 0.74 MG/DL (ref 0.76–1.27)
EOSINOPHIL # BLD AUTO: 0.2 X10E3/UL (ref 0–0.4)
EOSINOPHIL NFR BLD AUTO: 2 %
ERYTHROCYTE [DISTWIDTH] IN BLOOD BY AUTOMATED COUNT: 14.5 % (ref 12.3–15.4)
GLOBULIN SER CALC-MCNC: 3.2 G/DL (ref 1.5–4.5)
GLUCOSE SERPL-MCNC: 87 MG/DL (ref 65–99)
HCT VFR BLD AUTO: 34.1 % (ref 37.5–51)
HDLC SERPL-MCNC: 36 MG/DL
HGB BLD-MCNC: 11.1 G/DL (ref 12.6–17.7)
IMM GRANULOCYTES # BLD: 0 X10E3/UL (ref 0–0.1)
IMM GRANULOCYTES NFR BLD: 0 %
LDLC SERPL CALC-MCNC: 44 MG/DL (ref 0–99)
LYMPHOCYTES # BLD AUTO: 3.2 X10E3/UL (ref 0.7–3.1)
LYMPHOCYTES NFR BLD AUTO: 46 %
MCH RBC QN AUTO: 30.2 PG (ref 26.6–33)
MCHC RBC AUTO-ENTMCNC: 32.6 G/DL (ref 31.5–35.7)
MCV RBC AUTO: 93 FL (ref 79–97)
MONOCYTES # BLD AUTO: 0.5 X10E3/UL (ref 0.1–0.9)
MONOCYTES NFR BLD AUTO: 8 %
NEUTROPHILS # BLD AUTO: 3.1 X10E3/UL (ref 1.4–7)
NEUTROPHILS NFR BLD AUTO: 44 %
PLATELET # BLD AUTO: 272 X10E3/UL (ref 150–379)
POTASSIUM SERPL-SCNC: 4.1 MMOL/L (ref 3.5–5.2)
PROT SERPL-MCNC: 7 G/DL (ref 6–8.5)
RBC # BLD AUTO: 3.68 X10E6/UL (ref 4.14–5.8)
SODIUM SERPL-SCNC: 141 MMOL/L (ref 134–144)
TRIGL SERPL-MCNC: 152 MG/DL (ref 0–149)
VLDLC SERPL CALC-MCNC: 30 MG/DL (ref 5–40)
WBC # BLD AUTO: 7 X10E3/UL (ref 3.4–10.8)

## 2017-09-15 ENCOUNTER — OFFICE VISIT (OUTPATIENT)
Dept: FAMILY MEDICINE CLINIC | Age: 82
End: 2017-09-15

## 2017-09-15 VITALS
TEMPERATURE: 98 F | OXYGEN SATURATION: 97 % | HEART RATE: 71 BPM | SYSTOLIC BLOOD PRESSURE: 126 MMHG | BODY MASS INDEX: 20.09 KG/M2 | WEIGHT: 128 LBS | RESPIRATION RATE: 18 BRPM | HEIGHT: 67 IN | DIASTOLIC BLOOD PRESSURE: 54 MMHG

## 2017-09-15 DIAGNOSIS — R19.7 DIARRHEA, UNSPECIFIED TYPE: Primary | ICD-10-CM

## 2017-09-15 RX ORDER — LOPERAMIDE HCL 2 MG
4 TABLET ORAL
Qty: 20 TAB | Refills: 0 | Status: SHIPPED | OUTPATIENT
Start: 2017-09-15 | End: 2017-09-25

## 2017-09-15 RX ORDER — BISMUTH SUBSALICYLATE 262 MG/1
2 TABLET, CHEWABLE ORAL
Qty: 10 TAB | Refills: 0 | Status: SHIPPED | OUTPATIENT
Start: 2017-09-15 | End: 2018-02-05

## 2017-09-15 NOTE — MR AVS SNAPSHOT
Visit Information Date & Time Provider Department Dept. Phone Encounter #  
 9/15/2017  1:50 PM Josh Bains MD Tanya Patino [id number] Follow-up Instructions Return if symptoms worsen or fail to improve, for due for Medicare Wellness. Your Appointments 12/29/2017  8:40 AM  
ROUTINE CARE with Saman Rivera MD  
[facility] [address] [place] [address]) Appt Note: 4 mo f/u-Hypercholesterolemia [address] [address]  
[place] [address] [place] [address] Street [address]  
  
    
 1/25/2018 11:40 AM  
ESTABLISHED PATIENT with Suraj Gibson MD  
[facility] ([address]) Appt Note: 6 mo fup  
 [facility] [place] [address] [facility]  
[address] [facility] [address] [address] Upcoming Health Maintenance Date Due  
 MEDICARE YEARLY EXAM 4/7/2017 GLAUCOMA SCREENING Q2Y 12/8/2018 DTaP/Tdap/Td series (2 - Td) 9/13/2026 Allergies as of 9/15/2017  Review Complete On: 9/15/2017 By: Josh Bains MD  
 No Known Allergies Current Immunizations  Reviewed on 12/12/2016 Name Date Influenza High Dose Vaccine PF 8/29/2017 Influenza Vaccine 10/7/2015, 10/14/2014, 9/27/2013 Influenza Vaccine (Quad) PF 9/12/2016 Influenza Vaccine Split 10/19/2011, 10/19/2005 PPD 5/5/2009 Pneumococcal Conjugate (PCV-13) 4/30/2015 TD Vaccine 8/25/2010, 5/15/2006, 10/22/2002 Tdap 9/13/2016 ZZZ-RETIRED (DO NOT USE) Pneumococcal Vaccine (Unspecified Type) 4/2/2010, 12/1/2000 Not reviewed this visit You Were Diagnosed With   
  
 Codes Comments Diarrhea, unspecified type    -  Primary ICD-10-CM: R19.7 ICD-9-CM: 787.91 Vitals BP Pulse Temp Resp Height(growth percentile) Weight(growth percentile) 126/54 (BP 1 Location: Right arm, BP Patient Position: Sitting) 71 98 °F (36.7 °C) (Oral) 18 5' 7\" (1.702 m) 128 lb (58.1 kg) SpO2 BMI Smoking Status 97% 20.05 kg/m2 Never Smoker Vitals History BMI and BSA Data Body Mass Index Body Surface Area 20.05 kg/m 2 1.66 m 2 Preferred Pharmacy Pharmacy Name Phone [address] [place], [place] - [address] [phone] Your Updated Medication List  
  
   
This list is accurate as of: 9/15/17  2:16 PM.  Always use your most recent med list.  
  
  
  
  
 albuterol 90 mcg/actuation inhaler Commonly known as:  PROAIR HFA Take 1 Puff by inhalation every four (4) hours as needed for Wheezing (or coughing spasms). aspirin delayed-release 81 mg tablet Take 81 mg by mouth daily. atenolol 25 mg tablet Commonly known as:  TENORMIN  
TAKE 1/2 TABLET DAILY  
  
 bismuth subsalicylate 934 mg Chew Commonly known as:  PEPTO-BISMOL Take 2 Tabs by mouth nightly as needed. BOOST HIGH PROTEIN Liqd Generic drug:  food supplemt, lactose-reduced Take 237 mL by mouth three (3) times daily. colestipol 1 gram tablet Commonly known as:  COLESTID  
TAKE ONE TABLET BY MOUTH TWICE DAILY  
  
 finasteride 5 mg tablet Commonly known as:  PROSCAR Take 1 Tab by mouth daily. loperamide 2 mg tablet Commonly known as:  IMMODIUM Take 2 Tabs by mouth nightly as needed for Diarrhea for up to 10 days. NITROSTAT 0.4 mg SL tablet Generic drug:  nitroglycerin PLACE 1 TABLET SUBLINGUAL EVERY 5 MINUTES AS NEEDED  
  
 oxybutynin 5 mg tablet Commonly known as:  EZSULGUN Take 1 Tab by mouth two (2) times a day. PROBIOTIC PO Take  by mouth daily. silodosin 8 mg capsule Commonly known as:  RAPAFLO Take 1 Cap by mouth nightly. simvastatin 10 mg tablet Commonly known as:  ZOCOR Take 1 Tab by mouth nightly. VITAMIN B-12 1,000 mcg tablet Generic drug:  cyanocobalamin Take 1,000 mcg by mouth daily. VITAMIN D3 1,000 unit Cap Generic drug:  cholecalciferol Take  by mouth daily. Prescriptions Sent to Pharmacy Refills  
 loperamide (IMMODIUM) 2 mg tablet 0 Sig: Take 2 Tabs by mouth nightly as needed for Diarrhea for up to 10 days. Class: Normal  
 Pharmacy: [address] [place] [address] #: [phone] Route: Oral  
 bismuth subsalicylate (PEPTO-BISMOL) 262 mg chew 0 Sig: Take 2 Tabs by mouth nightly as needed. Class: Normal  
 Pharmacy: [address] [place] [address] #: [phone] Route: Oral  
  
We Performed the Following METABOLIC PANEL, COMPREHENSIVE [90346 CPT(R)] Follow-up Instructions Return if symptoms worsen or fail to improve, for due for Medicare Wellness. Patient Instructions Diarrhea: Care Instructions Your Care Instructions Diarrhea is loose, watery stools (bowel movements). The exact cause is often hard to find. Sometimes diarrhea is your body's way of getting rid of what caused an upset stomach. Viruses, food poisoning, and many medicines can cause diarrhea. Some people get diarrhea in response to emotional stress, anxiety, or certain foods. Almost everyone has diarrhea now and then. It usually isn't serious, and your stools will return to normal soon. The important thing to do is replace the fluids you have lost, so you can prevent dehydration. The doctor has checked you carefully, but problems can develop later. If you notice any problems or new symptoms, get medical treatment right away. Follow-up care is a key part of your treatment and safety. Be sure to make and go to all appointments, and call your doctor if you are having problems. It's also a good idea to know your test results and keep a list of the medicines you take. How can you care for yourself at home? · Watch for signs of dehydration, which means your body has lost too much water. Dehydration is a serious condition and should be treated right away. Signs of dehydration are: 
¨ Increasing thirst and dry eyes and mouth. ¨ Feeling faint or lightheaded. ¨ Darker urine, and a smaller amount of urine than normal. 
· To prevent dehydration, drink plenty of fluids, enough so that your urine is light yellow or clear like water. Choose water and other caffeine-free clear liquids until you feel better. If you have kidney, heart, or liver disease and have to limit fluids, talk with your doctor before you increase the amount of fluids you drink. · Begin eating small amounts of mild foods the next day, if you feel like it. ¨ Try yogurt that has live cultures of Lactobacillus. (Check the label.) ¨ Avoid spicy foods, fruits, alcohol, and caffeine until 48 hours after all symptoms are gone. ¨ Avoid chewing gum that contains sorbitol. ¨ Avoid dairy products (except for yogurt with Lactobacillus) while you have diarrhea and for 3 days after symptoms are gone. · The doctor may recommend that you take over-the-counter medicine, such as loperamide (Imodium), if you still have diarrhea after 6 hours. Read and follow all instructions on the label. Do not use this medicine if you have bloody diarrhea, a high fever, or other signs of serious illness. Call your doctor if you think you are having a problem with your medicine. When should you call for help? Call 911 anytime you think you may need emergency care. For example, call if: 
· You passed out (lost consciousness). · Your stools are maroon or very bloody. Call your doctor now or seek immediate medical care if: 
· You are dizzy or lightheaded, or you feel like you may faint. · Your stools are black and look like tar, or they have streaks of blood. · You have new or worse belly pain. · You have symptoms of dehydration, such as: ¨ Dry eyes and a dry mouth. ¨ Passing only a little dark urine. ¨ Feeling thirstier than usual. 
· You have a new or higher fever. Watch closely for changes in your health, and be sure to contact your doctor if: 
· Your diarrhea is getting worse. · You see pus in the diarrhea. · You are not getting better after 2 days (48 hours). Where can you learn more? Go to http://vipin-josé miguel.info/. Enter W235 in the search box to learn more about \"Diarrhea: Care Instructions. \" Current as of: March 20, 2017 Content Version: 11.3 © 4076-5640 [employer]. Care instructions adapted under license by [employer] (which disclaims liability or warranty for this information). If you have questions about a medical condition or this instruction, always ask your healthcare professional. [address] any warranty or liability for your use of this information. Please provide this summary of care documentation to your next provider. Your primary care clinician is listed as [address]. If you have any questions after today's visit, please call [phone].

## 2017-09-15 NOTE — PATIENT INSTRUCTIONS
Diarrhea: Care Instructions  Your Care Instructions    Diarrhea is loose, watery stools (bowel movements). The exact cause is often hard to find. Sometimes diarrhea is your body's way of getting rid of what caused an upset stomach. Viruses, food poisoning, and many medicines can cause diarrhea. Some people get diarrhea in response to emotional stress, anxiety, or certain foods. Almost everyone has diarrhea now and then. It usually isn't serious, and your stools will return to normal soon. The important thing to do is replace the fluids you have lost, so you can prevent dehydration. The doctor has checked you carefully, but problems can develop later. If you notice any problems or new symptoms, get medical treatment right away. Follow-up care is a key part of your treatment and safety. Be sure to make and go to all appointments, and call your doctor if you are having problems. It's also a good idea to know your test results and keep a list of the medicines you take. How can you care for yourself at home? · Watch for signs of dehydration, which means your body has lost too much water. Dehydration is a serious condition and should be treated right away. Signs of dehydration are:  ¨ Increasing thirst and dry eyes and mouth. ¨ Feeling faint or lightheaded. ¨ Darker urine, and a smaller amount of urine than normal.  · To prevent dehydration, drink plenty of fluids, enough so that your urine is light yellow or clear like water. Choose water and other caffeine-free clear liquids until you feel better. If you have kidney, heart, or liver disease and have to limit fluids, talk with your doctor before you increase the amount of fluids you drink. · Begin eating small amounts of mild foods the next day, if you feel like it. ¨ Try yogurt that has live cultures of Lactobacillus. (Check the label.)  ¨ Avoid spicy foods, fruits, alcohol, and caffeine until 48 hours after all symptoms are gone.   ¨ Avoid chewing gum that contains sorbitol. ¨ Avoid dairy products (except for yogurt with Lactobacillus) while you have diarrhea and for 3 days after symptoms are gone. · The doctor may recommend that you take over-the-counter medicine, such as loperamide (Imodium), if you still have diarrhea after 6 hours. Read and follow all instructions on the label. Do not use this medicine if you have bloody diarrhea, a high fever, or other signs of serious illness. Call your doctor if you think you are having a problem with your medicine. When should you call for help? Call 911 anytime you think you may need emergency care. For example, call if:  · You passed out (lost consciousness). · Your stools are maroon or very bloody. Call your doctor now or seek immediate medical care if:  · You are dizzy or lightheaded, or you feel like you may faint. · Your stools are black and look like tar, or they have streaks of blood. · You have new or worse belly pain. · You have symptoms of dehydration, such as:  ¨ Dry eyes and a dry mouth. ¨ Passing only a little dark urine. ¨ Feeling thirstier than usual.  · You have a new or higher fever. Watch closely for changes in your health, and be sure to contact your doctor if:  · Your diarrhea is getting worse. · You see pus in the diarrhea. · You are not getting better after 2 days (48 hours). Where can you learn more? Go to http://vipin-josé miguel.info/. Enter S301 in the search box to learn more about \"Diarrhea: Care Instructions. \"  Current as of: March 20, 2017  Content Version: 11.3  © 8211-4878 [employer]. Care instructions adapted under license by [email or web address] (which disclaims liability or warranty for this information). If you have questions about a medical condition or this instruction, always ask your healthcare professional. [address] any warranty or liability for your use of this information.

## 2017-09-15 NOTE — PROGRESS NOTES
Reviewed record in preparation for visit and have necessary documentation  Pt did not bring medication to office visit for review  Goals that were addressed and/or need to be completed during or after this appointment include   Health Maintenance Due   Topic Date Due    MEDICARE YEARLY EXAM  04/07/2017

## 2017-09-15 NOTE — PROGRESS NOTES
Progress Note    Patient: Adalid Erickson MRN: [id number]  SSN: [id number]    YOB: 1923  Age: 80 y.o. Sex: male        Chief Complaint   Patient presents with    Diarrhea    Fatigue         Subjective:     Encounter Diagnoses   Name Primary?  Diarrhea, unspecified type Yes       Diarrhea and fatigue  Patient is presenting with diarrhea x 4 days. Diarrhea occurs only at night with urgency. Stools are very watery and loose. No mucus in the stool. Of note, patient was seen at this practice on 8/28 for constipation and prescribed a laxative. Last took the laxative on Tuesday. Denies fever, chills, abdominal pain, BRBPR. Weight has been stable since last OV. No change in appetite. Has taken loperamide for diarrhea. Current and past medical information:    Current Medications after this visit[de-identified]    Current Outpatient Prescriptions   Medication Sig    loperamide (IMMODIUM) 2 mg tablet Take 2 Tabs by mouth nightly as needed for Diarrhea for up to 10 days.  bismuth subsalicylate (PEPTO-BISMOL) 262 mg chew Take 2 Tabs by mouth nightly as needed.  colestipol (COLESTID) 1 gram tablet TAKE ONE TABLET BY MOUTH TWICE DAILY    simvastatin (ZOCOR) 10 mg tablet Take 1 Tab by mouth nightly.  oxybutynin (DITROPAN) 5 mg tablet Take 1 Tab by mouth two (2) times a day.  silodosin (RAPAFLO) 8 mg capsule Take 1 Cap by mouth nightly.  atenolol (TENORMIN) 25 mg tablet TAKE 1/2 TABLET DAILY    food supplemt, lactose-reduced (BOOST HIGH PROTEIN) liqd Take 237 mL by mouth three (3) times daily.  NITROSTAT 0.4 mg SL tablet PLACE 1 TABLET SUBLINGUAL EVERY 5 MINUTES AS NEEDED    LACTOBACILLUS RHAMNOSUS GG (PROBIOTIC PO) Take  by mouth daily.  Cholecalciferol, Vitamin D3, (VITAMIN D) 1,000 unit Cap Take  by mouth daily.  aspirin delayed-release 81 mg tablet Take 81 mg by mouth daily.  cyanocobalamin (VITAMIN B-12) 1,000 mcg tablet Take 1,000 mcg by mouth daily.     finasteride (PROSCAR) 5 mg tablet Take 1 Tab by mouth daily.  albuterol (PROAIR HFA) 90 mcg/actuation inhaler Take 1 Puff by inhalation every four (4) hours as needed for Wheezing (or coughing spasms). No current facility-administered medications for this visit. Patient Active Problem List    Diagnosis Date Noted    PAC (premature atrial contraction) 07/29/2014    Chronic anemia 02/05/2014    GERD (gastroesophageal reflux disease) 05/17/2012    Carotid artery disease ([facility] 07/19/2011    Anemia 09/15/2010    Pure hypercholesterolemia 05/12/2010    Esophageal reflux 05/12/2010    BPH (benign prostatic hyperplasia) 05/12/2010    Gastritis 05/12/2010    CAD (coronary artery disease) 05/12/2010    Hiatal hernia 05/12/2010    Melanoma ([facility] 05/12/2010       Past Medical History:   Diagnosis Date    BPH (benign prostatic hyperplasia) 5/12/2010    CAD (coronary artery disease)     Carotid artery disease ([facility] 7/19/2011    Esophageal reflux 5/12/2010    Gastritis 5/12/2010    Hiatal hernia 5/12/2010    IBS (irritable bowel syndrome)     [facility] 5/12/2010    Pure hypercholesterolemia 5/12/2010    S/P CABG (coronary artery bypass graft)     Tooth fracture 2/14       No Known Allergies    Past Surgical History:   Procedure Laterality Date    ABDOMEN SURGERY PROC UNLISTED  1990    hernia, right inguinal    DUPLEX CAROTID BILATERAL  7/2011    Mild bilateral disease    ECHO 2D ADULT  2010    normal LV wall motion and ejection fraction, left atrial enlargement, mild aortic regurgitation, mild to moderate mitral regurgitation and mild tricuspid regurgitation. The ejection fraction was 55-60%.  HX CORONARY ARTERY BYPASS GRAFT  1995    LIMA to LAD, SVG to diagonal, SVG to trifurcation vessel and SVG to obtuse marginal.     HX HEART CATHETERIZATION  1995     Left ventricular wall motion is mild hypokinesis of the anterior wall. Ejection Fraction is estimated at 55%. The Coronary Angiography revealed:.  LAD 80% stenosis. OM1 80% stenosis. RCA >90% stenosis.  HX HEENT      melanoma surgery x2    HX OTHER SURGICAL      Treadmill stress test 7/26/12 - walked 6:43, no chest pain, (8.0 METS); stopped for SOB; no ischemic EKG changes, frequent PVC's including in couplets. Also one brief run of NSVT (7 beats) during stage 2.     HX OTHER SURGICAL      Carotid dopplers 7/12  show 10-49% stenosis bilat.  HX OTHER SURGICAL      Exercise cardiolite 8/10/12 - walked 7 min without chest pain; EKG with anteroseptal infarct age undetermined; no ischemic EKG changes; short runs (7 beat) of NSVT during exercise. Normal myocardial perfusion and function. LVEF 69%     HX OTHER SURGICAL      Carotid duplex 6/20/14 - mild 10-49% stenosis bilat     STRESS TEST - GXT  7/2011    WNL       Social History     Social History    Marital status:      Spouse name: N/A    Number of children: N/A    Years of education: N/A     Social History Main Topics    Smoking status: Never Smoker    Smokeless tobacco: Never Used    Alcohol use 0.0 oz/week     0 Standard drinks or equivalent per week      Comment: Occasionally    Drug use: No    Sexual activity: Not Asked     Other Topics Concern    None     Social History Narrative       {Review of Systems   Constitutional: Positive for malaise/fatigue. Negative for chills, fever and weight loss. Cardiovascular: Negative for palpitations. Gastrointestinal: Negative for abdominal pain, blood in stool, constipation, diarrhea and melena. Objective:     Vitals:    09/15/17 1353   BP: 126/54   Pulse: 71   Resp: 18   Temp: 98 °F (36.7 °C)   TempSrc: Oral   SpO2: 97%   Weight: 128 lb (58.1 kg)   Height: 5' 7\" (1.702 m)      Body mass index is 20.05 kg/(m^2). Physical Exam   Constitutional: He is oriented to person, place, and time. He appears well-developed and well-nourished. Cardiovascular: Normal rate and regular rhythm.     Pulmonary/Chest: Effort normal and breath sounds normal.   Abdominal: Soft. Bowel sounds are normal. He exhibits no distension and no mass. There is no tenderness. There is no rebound and no guarding. Neurological: He is alert and oriented to person, place, and time. Health Maintenance Due   Topic Date Due    MEDICARE YEARLY EXAM  04/07/2017       Assessment and orders:     Encounter Diagnoses     ICD-10-CM ICD-9-CM   1. Diarrhea, unspecified type R19.7 787.91       1. Diarrhea, unspecified type  Advised patient that I wanted to check labs as he has had diarrhea x 4 days. Diarrhea sounds more secretory though odd that he has had no symptoms during the day. Advised him to stop taking constipation medications (laxatives) has they may be the cause of the his symptoms.   - loperamide (IMMODIUM) 2 mg tablet; Take 2 Tabs by mouth nightly as needed for Diarrhea for up to 10 days. Dispense: 20 Tab; Refill: 0  - METABOLIC PANEL, COMPREHENSIVE  - bismuth subsalicylate (PEPTO-BISMOL) 262 mg chew; Take 2 Tabs by mouth nightly as needed. Dispense: 10 Tab; Refill: 0        Plan of care:  Discussed diagnoses in detail with patient. Medication risks/benefits/side effects discussed with patient. All of the patient's questions were addressed. The patient understands and agrees with our plan of care. The patient knows to call back if they are unsure of or forget any changes we discussed today or if the symptoms change. The patient received an After-Visit Summary which contains VS, orders, medication list and allergy list. This can be used as a \"mini-medical record\" should they have to seek medical care while out of town. Follow-up Disposition:  Return if symptoms worsen or fail to improve, for due for Medicare Wellness.     Future Appointments  Date Time Provider Taisha Aguero   12/29/2017 8:40 AM Shadi Niño MD [facility] SCHED   1/25/2018 11:40 AM Nikita Morrell MD [address] [place] [address]       Signed By: Walter Caballero MD     September 15, 2017

## 2017-10-16 DIAGNOSIS — I25.10 CORONARY ARTERY DISEASE INVOLVING NATIVE CORONARY ARTERY OF NATIVE HEART WITHOUT ANGINA PECTORIS: Chronic | ICD-10-CM

## 2017-10-16 RX ORDER — SIMVASTATIN 10 MG/1
10 TABLET, FILM COATED ORAL
Qty: 90 TAB | Refills: 0 | Status: CANCELLED | OUTPATIENT
Start: 2017-10-16

## 2017-10-16 RX ORDER — SIMVASTATIN 10 MG/1
10 TABLET, FILM COATED ORAL
Qty: 90 TAB | Refills: 1 | Status: SHIPPED | OUTPATIENT
Start: 2017-10-16 | End: 2018-04-17 | Stop reason: SDUPTHER

## 2017-12-08 ENCOUNTER — OFFICE VISIT (OUTPATIENT)
Dept: FAMILY MEDICINE CLINIC | Age: 82
End: 2017-12-08

## 2017-12-08 VITALS
SYSTOLIC BLOOD PRESSURE: 123 MMHG | BODY MASS INDEX: 20 KG/M2 | WEIGHT: 127.4 LBS | RESPIRATION RATE: 20 BRPM | DIASTOLIC BLOOD PRESSURE: 53 MMHG | HEIGHT: 67 IN | HEART RATE: 75 BPM | OXYGEN SATURATION: 99 % | TEMPERATURE: 97.4 F

## 2017-12-08 DIAGNOSIS — I25.10 CORONARY ARTERY DISEASE INVOLVING NATIVE CORONARY ARTERY OF NATIVE HEART WITHOUT ANGINA PECTORIS: Chronic | ICD-10-CM

## 2017-12-08 DIAGNOSIS — H91.92 DECREASED HEARING OF LEFT EAR: Primary | ICD-10-CM

## 2017-12-08 DIAGNOSIS — K21.9 GASTROESOPHAGEAL REFLUX DISEASE WITHOUT ESOPHAGITIS: ICD-10-CM

## 2017-12-08 DIAGNOSIS — H61.23 BILATERAL IMPACTED CERUMEN: ICD-10-CM

## 2017-12-08 DIAGNOSIS — L85.3 DRY SKIN: ICD-10-CM

## 2017-12-08 RX ORDER — TRIAMCINOLONE ACETONIDE 1 MG/ML
LOTION TOPICAL 3 TIMES DAILY
COMMUNITY
End: 2018-03-20

## 2017-12-08 NOTE — MR AVS SNAPSHOT
Visit Information Date & Time Provider Department Dept. Phone Encounter #  
 12/8/2017  1:05 PM Nae Somers MD [address] [place] [id number] Follow-up Instructions Return in about 4 weeks (around 1/5/2018) for fasting. also schedule PORFIRIO Mishraen Juan Luis Your Appointments 12/29/2017  8:40 AM  
ROUTINE CARE with Nae Somers MD  
[facility] Appt Note: 4 mo f/u-Hypercholesterolemia [address] [address]  
[place] [address] [address]  
  
    
 1/25/2018 11:40 AM  
ESTABLISHED PATIENT with Nisreen Lozano MD  
[facility] ([facility]) Appt Note: 6 mo fup  
 [address] [facility] [address] [facility]  
[address] [facility] [address] [address] Upcoming Health Maintenance Date Due  
 MEDICARE YEARLY EXAM 4/7/2017 GLAUCOMA SCREENING Q2Y 12/8/2018 DTaP/Tdap/Td series (2 - Td) 9/13/2026 Allergies as of 12/8/2017  Review Complete On: 12/8/2017 By: Jaja Elder LPN No Known Allergies Current Immunizations  Reviewed on 12/12/2016 Name Date Influenza High Dose Vaccine PF 8/29/2017 Influenza Vaccine 10/7/2015, 10/14/2014, 9/27/2013 Influenza Vaccine (Quad) PF 9/12/2016 Influenza Vaccine Split 10/19/2011, 10/19/2005 PPD 5/5/2009 Pneumococcal Conjugate (PCV-13) 4/30/2015 TD Vaccine 8/25/2010, 5/15/2006, 10/22/2002 Tdap 9/13/2016 ZZZ-RETIRED (DO NOT USE) Pneumococcal Vaccine (Unspecified Type) 4/2/2010, 12/1/2000 Not reviewed this visit You Were Diagnosed With   
  
 Codes Comments Decreased hearing of left ear    -  Primary ICD-10-CM: H91.92 
ICD-9-CM: 389.9 Bilateral impacted cerumen     ICD-10-CM: H61.23 
ICD-9-CM: 380.4 Dry skin     ICD-10-CM: L85.3 ICD-9-CM: 701.1 Gastroesophageal reflux disease without esophagitis     ICD-10-CM: K21.9 ICD-9-CM: 530.81 Coronary artery disease involving native coronary artery of native heart without angina pectoris     ICD-10-CM: I25.10 ICD-9-CM: 414.01 Vitals BP Pulse Temp Resp Height(growth percentile) Weight(growth percentile) 152/60 75 97.4 °F (36.3 °C) (Oral) 20 5' 7\" (1.702 m) 127 lb 6.4 oz (57.8 kg) SpO2 BMI Smoking Status 99% 19.95 kg/m2 Never Smoker Vitals History BMI and BSA Data Body Mass Index Body Surface Area  
 19.95 kg/m 2 1.65 m 2 Preferred Pharmacy Pharmacy Name Phone  [address] [phone] Your Updated Medication List  
  
   
This list is accurate as of: 12/8/17  1:23 PM.  Always use your most recent med list.  
  
  
  
  
 albuterol 90 mcg/actuation inhaler Commonly known as:  PROAIR HFA Take 1 Puff by inhalation every four (4) hours as needed for Wheezing (or coughing spasms). aspirin delayed-release 81 mg tablet Take 81 mg by mouth daily. atenolol 25 mg tablet Commonly known as:  TENORMIN  
TAKE 1/2 TABLET DAILY  
  
 bismuth subsalicylate 237 mg Chew Commonly known as:  PEPTO-BISMOL Take 2 Tabs by mouth nightly as needed. BOOST HIGH PROTEIN Liqd Generic drug:  food supplemt, lactose-reduced Take 237 mL by mouth three (3) times daily. colestipol 1 gram tablet Commonly known as:  COLESTID  
TAKE ONE TABLET BY MOUTH TWICE DAILY  
  
 finasteride 5 mg tablet Commonly known as:  PROSCAR Take 1 Tab by mouth daily. NITROSTAT 0.4 mg SL tablet Generic drug:  nitroglycerin PLACE 1 TABLET SUBLINGUAL EVERY 5 MINUTES AS NEEDED  
  
 oxybutynin 5 mg tablet Commonly known as:  TYZMCTNW Take 1 Tab by mouth two (2) times a day. PROBIOTIC PO Take  by mouth daily. silodosin 8 mg capsule Commonly known as:  RAPAFLO Take 1 Cap by mouth nightly. simvastatin 10 mg tablet Commonly known as:  ZOCOR Take 1 Tab by mouth nightly. triamcinolone 0.1 % lotion Commonly known as:  KENALOG Apply  to affected area three (3) times daily. use thin layer VITAMIN B-12 1,000 mcg tablet Generic drug:  cyanocobalamin Take 1,000 mcg by mouth daily. VITAMIN D3 1,000 unit Cap Generic drug:  cholecalciferol Take  by mouth daily. Follow-up Instructions Return in about 4 weeks (around 1/5/2018) for fasting. also schedule MWE. Sharron Coppola Patient Instructions Eucerin Cream to back twice daily. Wax softener for ears- 2 drops daily for wax removal. 
 
 
  
Introducing [facility]! Dear Annemarie Casey: Thank you for requesting a [employer] account. Our records indicate that you have previously registered for a [employer] account but its currently inactive. Please call our Tap[employer] support line at [phone]. Additional Information If you have questions, please visit the Frequently Asked Questions section of the [employer] website at [email or web address] Remember, [employer] is NOT to be used for urgent needs. For medical emergencies, dial 911. Now available from your iPhone and Android! Please provide this summary of care documentation to your next provider. Your primary care clinician is listed as [address]. If you have any questions after today's visit, please call [phone].

## 2017-12-08 NOTE — PROGRESS NOTES
Chief Complaint   Patient presents with    Wax in Ear     Left    Skin Problem     Back Itching at bedtime     Body mass index is 19.95 kg/(m^2). 1. Have you been to the ER, urgent care clinic since your last visit? Hospitalized since your last visit? No  2. Have you seen or consulted any other health care providers outside of the [address] [place] [address] since your last visit? Include any pap smears or colon screening.  No    Reviewed record in preparation for visit and have necessary documentation  Pt did not bring medication to office visit for review  Information was given to pt on Advanced Directives, Living Will  Information was given on Shingles Vaccine  Opportunity was given for questions  Goals that were addressed and/or need to be completed after this appointment include:     Health Maintenance Due   Topic Date Due    MEDICARE YEARLY EXAM  04/07/2017

## 2017-12-08 NOTE — PROGRESS NOTES
Progress Note    Patient: Mindy Byrne MRN: [id number]  SSN: [id number]    YOB: 1923  Age: 80 y.o. Sex: male        Chief Complaint   Patient presents with    Wax in Ear     Left    Skin Problem     Back Itching at bedtime         Subjective:     Encounter Diagnoses   Name Primary?  Decreased hearing of left ear: wax impaction. Hearing was restored after wax removal.     Yes    Bilateral impacted cerumen: On exam.          Dry skin: pruritis mainly lower back. Extremely dry skin on examination.  Gastroesophageal reflux disease without esophagitis::Current control of Symptoms:good  Hiatal Hernia:no  Current Medications: As needed  The patient has no history melena or bright red blood in the stools. The patient avoids high dose aspirin and NSAID therapy. The patient is aware of diet changes needed, elevating the head of the bed and appropriate use of antacids.  Coronary artery disease involving native coronary artery of native heart without angina pectoris:No chest pain, MATA or SOB. No PND or orthopnea. Due to his arthritis advanced age he is not as mobile as he used to be. He is walking with a cane taking short steps. No recent falls. Current and past medical information:    Current Medications after this visit[de-identified]   Current Outpatient Prescriptions   Medication Sig    triamcinolone (KENALOG) 0.1 % lotion Apply  to affected area three (3) times daily. use thin layer    simvastatin (ZOCOR) 10 mg tablet Take 1 Tab by mouth nightly.  bismuth subsalicylate (PEPTO-BISMOL) 262 mg chew Take 2 Tabs by mouth nightly as needed.  colestipol (COLESTID) 1 gram tablet TAKE ONE TABLET BY MOUTH TWICE DAILY    oxybutynin (DITROPAN) 5 mg tablet Take 1 Tab by mouth two (2) times a day.  silodosin (RAPAFLO) 8 mg capsule Take 1 Cap by mouth nightly.     atenolol (TENORMIN) 25 mg tablet TAKE 1/2 TABLET DAILY    finasteride (PROSCAR) 5 mg tablet Take 1 Tab by mouth daily.  food supplemt, lactose-reduced (BOOST HIGH PROTEIN) liqd Take 237 mL by mouth three (3) times daily.  albuterol (PROAIR HFA) 90 mcg/actuation inhaler Take 1 Puff by inhalation every four (4) hours as needed for Wheezing (or coughing spasms).  NITROSTAT 0.4 mg SL tablet PLACE 1 TABLET SUBLINGUAL EVERY 5 MINUTES AS NEEDED    LACTOBACILLUS RHAMNOSUS GG (PROBIOTIC PO) Take  by mouth daily.  Cholecalciferol, Vitamin D3, (VITAMIN D) 1,000 unit Cap Take  by mouth daily.  aspirin delayed-release 81 mg tablet Take 81 mg by mouth daily.  cyanocobalamin (VITAMIN B-12) 1,000 mcg tablet Take 1,000 mcg by mouth daily. No current facility-administered medications for this visit.         Patient Active Problem List    Diagnosis Date Noted    PAC (premature atrial contraction) 07/29/2014    Chronic anemia 02/05/2014    GERD (gastroesophageal reflux disease) 05/17/2012    Carotid artery disease ([facility] 07/19/2011    Anemia 09/15/2010    Pure hypercholesterolemia 05/12/2010    Esophageal reflux 05/12/2010    BPH (benign prostatic hyperplasia) 05/12/2010    Gastritis 05/12/2010    CAD (coronary artery disease) 05/12/2010    Hiatal hernia 05/12/2010    Melanoma ([address] 05/12/2010       Past Medical History:   Diagnosis Date    BPH (benign prostatic hyperplasia) 5/12/2010    CAD (coronary artery disease)     Carotid artery disease ([address] 7/19/2011    Esophageal reflux 5/12/2010    Gastritis 5/12/2010    Hiatal hernia 5/12/2010    IBS (irritable bowel syndrome)     Melanoma ([facility] 5/12/2010    Pure hypercholesterolemia 5/12/2010    S/P CABG (coronary artery bypass graft)     Tooth fracture 2/14       No Known Allergies    Past Surgical History:   Procedure Laterality Date    ABDOMEN SURGERY PROC UNLISTED  1990    hernia, right inguinal    DUPLEX CAROTID BILATERAL  7/2011    Mild bilateral disease    ECHO 2D ADULT  2010    normal LV wall motion and ejection fraction, left atrial enlargement, mild aortic regurgitation, mild to moderate mitral regurgitation and mild tricuspid regurgitation. The ejection fraction was 55-60%.  HX CORONARY ARTERY BYPASS GRAFT  1995    LIMA to LAD, SVG to diagonal, SVG to trifurcation vessel and SVG to obtuse marginal.     HX HEART CATHETERIZATION  1995     Left ventricular wall motion is mild hypokinesis of the anterior wall. Ejection Fraction is estimated at 55%. The Coronary Angiography revealed:. LAD 80% stenosis. OM1 80% stenosis. RCA >90% stenosis.  HX HEENT      melanoma surgery x2    HX OTHER SURGICAL      Treadmill stress test 7/26/12 - walked 6:43, no chest pain, (8.0 METS); stopped for SOB; no ischemic EKG changes, frequent PVC's including in couplets. Also one brief run of NSVT (7 beats) during stage 2.     HX OTHER SURGICAL      Carotid dopplers 7/12  show 10-49% stenosis bilat.  HX OTHER SURGICAL      Exercise cardiolite 8/10/12 - walked 7 min without chest pain; EKG with anteroseptal infarct age undetermined; no ischemic EKG changes; short runs (7 beat) of NSVT during exercise. Normal myocardial perfusion and function. LVEF 69%     HX OTHER SURGICAL      Carotid duplex 6/20/14 - mild 10-49% stenosis bilat     STRESS TEST - GXT  7/2011    WNL       Social History     Social History    Marital status:      Spouse name: N/A    Number of children: N/A    Years of education: N/A     Social History Main Topics    Smoking status: Never Smoker    Smokeless tobacco: Never Used    Alcohol use 0.0 oz/week     0 Standard drinks or equivalent per week      Comment: Occasionally    Drug use: No    Sexual activity: Not Asked     Other Topics Concern    None     Social History Narrative       Review of Systems   Constitutional: Negative. Negative for chills, fever, malaise/fatigue and weight loss. HENT: Positive for hearing loss. Eyes: Negative. Negative for blurred vision and double vision. Respiratory: Negative. Negative for cough, hemoptysis, sputum production and shortness of breath. Cardiovascular: Negative. Negative for chest pain, palpitations and orthopnea. Gastrointestinal: Negative. Negative for abdominal pain, blood in stool, heartburn, nausea and vomiting. Genitourinary: Negative. Negative for dysuria, frequency and urgency. Musculoskeletal: Positive for joint pain. Negative for falls, myalgias and neck pain. Skin: Negative. Negative for rash. Neurological: Negative. Negative for dizziness, tingling, tremors, weakness and headaches. Endo/Heme/Allergies: Negative. Psychiatric/Behavioral: Positive for memory loss. Negative for depression. He repeated himself on numerous occasions during his exam.       Objective:     Vitals:    12/08/17 1308 12/08/17 1330   BP: 152/60 123/53   Pulse: 75    Resp: 20    Temp: 97.4 °F (36.3 °C)    TempSrc: Oral    SpO2: 99%    Weight: 127 lb 6.4 oz (57.8 kg)    Height: 5' 7\" (1.702 m)       Body mass index is 19.95 kg/(m^2). Physical Exam   Constitutional: He is oriented to person, place, and time and well-developed, well-nourished, and in no distress. HENT:   Head: Normocephalic and atraumatic. Mouth/Throat: Oropharynx is clear and moist.   Bilateral wax impaction. Eyes: Right eye exhibits no discharge. Left eye exhibits no discharge. No scleral icterus. Neck: No tracheal deviation present. No thyromegaly present. No bruit. Cardiovascular: Normal rate, regular rhythm and normal heart sounds. Pulmonary/Chest: Effort normal and breath sounds normal.   Abdominal: Soft. Neurological: He is alert and oriented to person, place, and time. Skin: No rash noted. No erythema. Psychiatric: Mood and affect normal.   Nursing note and vitals reviewed. Health Maintenance Due   Topic Date Due    MEDICARE YEARLY EXAM  04/07/2017       Assessment and orders:     Diagnoses and all orders for this visit:    1.  Decreased hearing of left ear-restored after removal of earwax. 2. Bilateral impacted cerumen  -     REMOVE IMPACTED EAR WAX    3. Dry skin-Eucerin cream applied with a shower sponge on a stick to his lower back twice daily    4. Gastroesophageal reflux disease without esophagitis-symptoms controlled    5. Coronary artery disease involving native coronary artery of native heart without angina pectoris-follow with Dr. Marliss Siemens as necessary      Follow-up Disposition:  Return in about 4 weeks (around 1/5/2018) for fasting. also schedule PORFIRIO Loco Plan of care:  Discussed diagnoses in detail with patient. Medication risks/benefits/side effects discussed with patient. All of the patient's questions were addressed. The patient understands and agrees with our plan of care. The patient knows to call back if they are unsure of or forget any changes we discussed today or if the symptoms change. The patient received an After-Visit Summary which contains VS, orders, medication list and allergy list. This can be used as a \"mini-medical record\" should they have to seek medical care while out of town. Patient Care Team:  Baldo Zaragoza MD as PCP - General  Danilo Zaidi MD as Physician (Dermatology)  Mary Valladares MD as Physician (Gastroenterology)  Gifty Brown MD (Cardiology)  Edita Spencer MD (Urology)    Follow-up Disposition:  Return in about 4 weeks (around 1/5/2018) for fasting. also schedule PORFIRIO Loco     Future Appointments  Date Time Provider Taisha More   12/29/2017 8:40 AM Baldo Zaragoza MD [facility] SCHED   1/25/2018 11:40 AM Pedro Scales MD [address] [place] [address]       Signed By: Baldo Zaragoza MD     December 8, 2017

## 2018-01-26 ENCOUNTER — OFFICE VISIT (OUTPATIENT)
Dept: CARDIOLOGY CLINIC | Age: 83
End: 2018-01-26

## 2018-01-26 VITALS
BODY MASS INDEX: 20.25 KG/M2 | WEIGHT: 129 LBS | HEART RATE: 73 BPM | RESPIRATION RATE: 18 BRPM | OXYGEN SATURATION: 98 % | HEIGHT: 67 IN | SYSTOLIC BLOOD PRESSURE: 122 MMHG | DIASTOLIC BLOOD PRESSURE: 64 MMHG

## 2018-01-26 DIAGNOSIS — I25.10 CORONARY ARTERY DISEASE INVOLVING NATIVE CORONARY ARTERY OF NATIVE HEART WITHOUT ANGINA PECTORIS: Primary | Chronic | ICD-10-CM

## 2018-01-26 DIAGNOSIS — E78.00 PURE HYPERCHOLESTEROLEMIA: Chronic | ICD-10-CM

## 2018-01-26 NOTE — PROGRESS NOTES
Richard SuhOppenheim is a 80 y.o. male  Chief Complaint   Patient presents with    Coronary Artery Disease

## 2018-01-26 NOTE — PROGRESS NOTES
Konstantin Donnelly MD. [facility]              Patient: Arun Trujillo  : 8/15/1923      Today's Date: 2018            HISTORY OF PRESENT ILLNESS:     History of Present Illness:  Mr. Francesca Davis is here for follow-up. He is doing OK overall. Uses a cane to walk. He has fallen a couple of times past several months - he has slipped and tripped over things. Denies dizziness problems. No CP or sig SOB. PAST MEDICAL HISTORY:     Past Medical History:   Diagnosis Date    BPH (benign prostatic hyperplasia) 2010    CAD (coronary artery disease)     Carotid artery disease ([facility] [address] 2011    Esophageal reflux 2010    Gastritis 2010    Hiatal hernia 2010    IBS (irritable bowel syndrome)     Melanoma ([facility] [address] 2010    Pure hypercholesterolemia 2010    S/P CABG (coronary artery bypass graft)     Tooth fracture          Past Surgical History:   Procedure Laterality Date    ABDOMEN SURGERY PROC UNLISTED      hernia, right inguinal    DUPLEX CAROTID BILATERAL  2011    Mild bilateral disease    ECHO 2D ADULT      normal LV wall motion and ejection fraction, left atrial enlargement, mild aortic regurgitation, mild to moderate mitral regurgitation and mild tricuspid regurgitation. The ejection fraction was 55-60%.  HX CORONARY ARTERY BYPASS GRAFT      LIMA to LAD, SVG to diagonal, SVG to trifurcation vessel and SVG to obtuse marginal.     HX HEART CATHETERIZATION       Left ventricular wall motion is mild hypokinesis of the anterior wall. Ejection Fraction is estimated at 55%. The Coronary Angiography revealed:. LAD 80% stenosis. OM1 80% stenosis. RCA >90% stenosis.  HX HEENT      melanoma surgery x2    HX OTHER SURGICAL      Treadmill stress test 12 - walked 6:43, no chest pain, (8.0 METS); stopped for SOB; no ischemic EKG changes, frequent PVC's including in couplets.  Also one brief run of NSVT (7 beats) during stage 2.     HX OTHER SURGICAL      Carotid dopplers 7/12  show 10-49% stenosis bilat.  HX OTHER SURGICAL      Exercise cardiolite 8/10/12 - walked 7 min without chest pain; EKG with anteroseptal infarct age undetermined; no ischemic EKG changes; short runs (7 beat) of NSVT during exercise. Normal myocardial perfusion and function. LVEF 69%     HX OTHER SURGICAL      Carotid duplex 6/20/14 - mild 10-49% stenosis bilat     STRESS TEST - GXT  7/2011    WNL           MEDICATIONS:     Current Outpatient Prescriptions   Medication Sig Dispense Refill    simvastatin (ZOCOR) 10 mg tablet Take 1 Tab by mouth nightly. 90 Tab 1    colestipol (COLESTID) 1 gram tablet TAKE ONE TABLET BY MOUTH TWICE DAILY 60 Tab 11    oxybutynin (DITROPAN) 5 mg tablet Take 1 Tab by mouth two (2) times a day. 60 Tab 5    silodosin (RAPAFLO) 8 mg capsule Take 1 Cap by mouth nightly. 90 Cap 2    atenolol (TENORMIN) 25 mg tablet TAKE 1/2 TABLET DAILY 45 Tab 3    food supplemt, lactose-reduced (BOOST HIGH PROTEIN) liqd Take 237 mL by mouth three (3) times daily.  NITROSTAT 0.4 mg SL tablet PLACE 1 TABLET SUBLINGUAL EVERY 5 MINUTES AS NEEDED 25 Tab 1    LACTOBACILLUS RHAMNOSUS GG (PROBIOTIC PO) Take  by mouth two (2) times a day.  Cholecalciferol, Vitamin D3, (VITAMIN D) 1,000 unit Cap Take  by mouth daily.  aspirin delayed-release 81 mg tablet Take 81 mg by mouth daily.  cyanocobalamin (VITAMIN B-12) 1,000 mcg tablet Take 1,000 mcg by mouth daily.  triamcinolone (KENALOG) 0.1 % lotion Apply  to affected area three (3) times daily. use thin layer      bismuth subsalicylate (PEPTO-BISMOL) 262 mg chew Take 2 Tabs by mouth nightly as needed. 10 Tab 0    finasteride (PROSCAR) 5 mg tablet Take 1 Tab by mouth daily. 1 Tab 0    albuterol (PROAIR HFA) 90 mcg/actuation inhaler Take 1 Puff by inhalation every four (4) hours as needed for Wheezing (or coughing spasms).  1 Inhaler 2       No Known Allergies          SOCIAL HISTORY:     Social History   Substance Use Topics    Smoking status: Never Smoker    Smokeless tobacco: Never Used    Alcohol use 0.0 oz/week     0 Standard drinks or equivalent per week      Comment: Occasionally         FAMILY HISTORY:     Family History   Problem Relation Age of Onset    Heart Disease Mother     Heart Disease Father             REVIEW OF SYSTEMS:         Review of Systems:    Constitutional: Negative for fever, chills    HEENT: Negative for nosebleeds    Respiratory: No SOB  Cardiovascular: Negative for syncope, orthopnea, and PND.    Gastrointestinal: Negative for melena.    Genitourinary: Negative for dysuria    Musculoskeletal: Negative for myalgias.    Skin: Negative for rash    Heme: No problems bleeding.    Neurological: Negative for speech change and focal weakness.    + IBS           PHYSICAL EXAM:         Physical Exam:      Visit Vitals    /64 (BP 1 Location: Left arm, BP Patient Position: Sitting)    Pulse 73    Resp 18    Ht 5' 7\" (1.702 m)    Wt 129 lb (58.5 kg)    SpO2 98%    BMI 20.2 kg/m2       Patient appears generally well, mood and affect are appropriate and pleasant.    HEENT: Normocephalic, atraumatic. Sclera anicteric. Hearing intact  Neck Exam: Supple, no JVD sitting up. + left neck bruit    Lung Exam: Clear to auscultation, even breath sounds.    Cardiac Exam: Regular rate and rhythm with no murmur    Abdomen: Soft, non-tender, normal bowel sounds.   Extremities: Trace lower extremity edema.  MAW.    Psych - appropriate affect  Neuro - non focal               LABS / OTHER STUDIES:         Lab Results   Component Value Date/Time    Sodium 141 08/30/2017 12:20 PM    Potassium 4.1 08/30/2017 12:20 PM    Chloride 102 08/30/2017 12:20 PM    CO2 27 08/30/2017 12:20 PM    Anion gap 9 09/14/2010 08:42 AM    Glucose 87 08/30/2017 12:20 PM    BUN 19 08/30/2017 12:20 PM    Creatinine 0.74 08/30/2017 12:20 PM    BUN/Creatinine ratio 26 08/30/2017 12:20 PM    GFR est AA 91 08/30/2017 12:20 PM    GFR est non-AA 79 08/30/2017 12:20 PM    Calcium 8.9 08/30/2017 12:20 PM    Bilirubin, total 0.4 08/30/2017 12:20 PM    AST (SGOT) 14 08/30/2017 12:20 PM    Alk. phosphatase 93 08/30/2017 12:20 PM    Protein, total 7.0 08/30/2017 12:20 PM    Albumin 3.8 08/30/2017 12:20 PM    Globulin 3.4 09/14/2010 08:42 AM    A-G Ratio 1.2 08/30/2017 12:20 PM    ALT (SGPT) 10 08/30/2017 12:20 PM     Lab Results   Component Value Date/Time    WBC 7.0 08/30/2017 12:20 PM    HGB 11.1 08/30/2017 12:20 PM    HCT 34.1 08/30/2017 12:20 PM    PLATELET 330 02/49/1036 12:20 PM    MCV 93 08/30/2017 12:20 PM       Lab Results   Component Value Date/Time    Cholesterol, total 110 08/30/2017 12:20 PM    HDL Cholesterol 36 08/30/2017 12:20 PM    LDL, calculated 44 08/30/2017 12:20 PM    VLDL, calculated 30 08/30/2017 12:20 PM    Triglyceride 152 08/30/2017 12:20 PM    CHOL/HDL Ratio 3.7 09/14/2010 08:42 AM           CARDIAC DIAGNOSTICS:         Cardiac Evaluation Includes:  EKG 6/20/14 - sinus bradycardia, 1st degree AV block ()    EKG 1/8/15 - NSR, 1st degree AVB, PRWP, PVC  EKG 1/12/16 - NSR, 1st degree AV block, old anterior infarct   EKG 1/20/17 - NSR, 1st degree AVB, possible old septal infarct    EKG 1/26/18 - NSR, 1st degree AVB, possible old septal infarct        Carotid Doppler 6/14 - 10-49% stenosis bilat    Holter 7/14 - NSR, some PAC and PVC's  Echo 12/3/15 - LVEF 65 %. Grade 1 diastolic dysfunction. RV size upper normal. Mild LAE. Mild-mod MR. Mild AI. Mod TR. RVSP 47.        CXR 11/23/15 - IMPRESSION: Mild pulmonary interstitial edema. No evidence of pneumonia.             ASSESSMENT AND PLAN:         Assessment and Plan:    1) Prior Cough and abnormal CXR (Mild pulmonary interstitial edema. No evidence of pneumonia).    - Besides a cough which is getting better, he says he feet OK.  Denies orthopnea.    - Echo 12/3/15 - LVEF 65 %. Grade 1 diastolic dysfunction. RV size upper normal. Mild LAE. Mild-mod MR.  Mild AI. Mod TR. RVSP 47.    - He was taking lasix just once every 7 days (he could not tolerate higher dose due to dizziness) in past  - On 7/14/16, he is no longer taking an lasix. I asked him to take lasix PRN.   - On 1/20/17 he says he is doing better and says he coughs infrequently. Jim Cortes has not taken lasix recently. - On 7/21/17 and 1/26/18 - he is doing well      2) CAD -    - Mr. Elaina Romberg denies any anginal complaints    - Continue medical management of CAD       3) Mild Carotid disease  - Carotid duplex 6/20/14 - mild 10-49% stenosis bilat    - on a statin and ASA      4) Dyslipidemia  - LDL acceptable on very low dose simvastatin   - Mr. Elaina Romberg says he is tolerating the statin      5) HTN    - BP OK on low dose atenolol without orthostatic symptoms   - continue meds       6) RTC in 6 months.  Patient expressed understanding of the plan - questions were answered.       On a side note he was in the [facility] during [facility] serving in [place].           Mearl Skiff, MD, [place] [address]  [address] [facility], [address]  [address]  [address], [address]  [place], [address] [place] [address]  Ph: [phone]   Ph [phone]

## 2018-01-26 NOTE — MR AVS SNAPSHOT
[address] [facility] [address] [facility] 
[phone] Patient: Delfino Rodrigues MRN:  [id number] Visit Information Date & Time Provider Department Dept. Phone Encounter #  
 2018  9:40 AM Logan Alvares MD [facility] Cathryn Aguilar [phone] [id number] Upcoming Health Maintenance Date Due  
 MEDICARE YEARLY EXAM 2017 GLAUCOMA SCREENING Q2Y 2018 DTaP/Tdap/Td series (2 - Td) 2026 Allergies as of 2018  Review Complete On: 2018 By: Logan Alvares MD  
 No Known Allergies Current Immunizations  Reviewed on 2016 Name Date Influenza High Dose Vaccine PF 2017 Influenza Vaccine 10/7/2015, 10/14/2014, 2013 Influenza Vaccine (Quad) PF 2016 Influenza Vaccine Split 10/19/2011, 10/19/2005 PPD 2009 Pneumococcal Conjugate (PCV-13) 2015 TD Vaccine 2010, 5/15/2006, 10/22/2002 Tdap 2016 ZZZ-RETIRED (DO NOT USE) Pneumococcal Vaccine (Unspecified Type) 2010, 2000 Not reviewed this visit You Were Diagnosed With   
  
 Codes Comments Coronary artery disease involving native coronary artery of native heart without angina pectoris    -  Primary ICD-10-CM: I25.10 ICD-9-CM: 414.01   
 Pure hypercholesterolemia     ICD-10-CM: E78.00 ICD-9-CM: 272.0 Vitals BP Pulse Resp Height(growth percentile) Weight(growth percentile) SpO2  
 122/64 (BP 1 Location: Left arm, BP Patient Position: Sitting) 73 18 5' 7\" (1.702 m) 129 lb (58.5 kg) 98% BMI Smoking Status 20.2 kg/m2 Never Smoker Vitals History BMI and BSA Data Body Mass Index Body Surface Area  
 20.2 kg/m 2 1.66 m 2 Preferred Pharmacy Pharmacy Name Phone [address] [place], [place] - [address] [phone] Your Updated Medication List  
  
   
 This list is accurate as of: 1/26/18 10:31 AM.  Always use your most recent med list.  
  
  
  
  
 albuterol 90 mcg/actuation inhaler Commonly known as:  PROAIR HFA Take 1 Puff by inhalation every four (4) hours as needed for Wheezing (or coughing spasms). aspirin delayed-release 81 mg tablet Take 81 mg by mouth daily. atenolol 25 mg tablet Commonly known as:  TENORMIN  
TAKE 1/2 TABLET DAILY  
  
 bismuth subsalicylate 639 mg Chew Commonly known as:  PEPTO-BISMOL Take 2 Tabs by mouth nightly as needed. BOOST HIGH PROTEIN Liqd Generic drug:  food supplemt, lactose-reduced Take 237 mL by mouth three (3) times daily. colestipol 1 gram tablet Commonly known as:  COLESTID  
TAKE ONE TABLET BY MOUTH TWICE DAILY  
  
 finasteride 5 mg tablet Commonly known as:  PROSCAR Take 1 Tab by mouth daily. NITROSTAT 0.4 mg SL tablet Generic drug:  nitroglycerin PLACE 1 TABLET SUBLINGUAL EVERY 5 MINUTES AS NEEDED  
  
 oxybutynin 5 mg tablet Commonly known as:  DZIOPVKZ Take 1 Tab by mouth two (2) times a day. PROBIOTIC PO Take  by mouth two (2) times a day. silodosin 8 mg capsule Commonly known as:  RAPAFLO Take 1 Cap by mouth nightly. simvastatin 10 mg tablet Commonly known as:  ZOCOR Take 1 Tab by mouth nightly. triamcinolone 0.1 % lotion Commonly known as:  KENALOG Apply  to affected area three (3) times daily. use thin layer VITAMIN B-12 1,000 mcg tablet Generic drug:  cyanocobalamin Take 1,000 mcg by mouth daily. VITAMIN D3 1,000 unit Cap Generic drug:  cholecalciferol Take  by mouth daily. We Performed the Following AMB POC EKG ROUTINE W/ 12 LEADS, INTER & REP [50720 CPT(R)] Introducing [facility]! Dear Peter Pearce: Thank you for requesting a [employer] account.   Our records indicate that you have previously registered for a [employer] account but its currently inactive. Please call our [employer] support line at [phone]. Additional Information If you have questions, please visit the Frequently Asked Questions section of the [employer] website at [email or web address] Remember, [employer] is NOT to be used for urgent needs. For medical emergencies, dial 911. Now available from your iPhone and Android! Please provide this summary of care documentation to your next provider. Your primary care clinician is listed as [address]. If you have any questions after today's visit, please call [phone].

## 2018-02-05 ENCOUNTER — OFFICE VISIT (OUTPATIENT)
Dept: FAMILY MEDICINE CLINIC | Age: 83
End: 2018-02-05

## 2018-02-05 VITALS
TEMPERATURE: 97.9 F | WEIGHT: 128 LBS | OXYGEN SATURATION: 98 % | BODY MASS INDEX: 20.09 KG/M2 | SYSTOLIC BLOOD PRESSURE: 130 MMHG | RESPIRATION RATE: 20 BRPM | HEIGHT: 67 IN | HEART RATE: 87 BPM | DIASTOLIC BLOOD PRESSURE: 60 MMHG

## 2018-02-05 DIAGNOSIS — L29.9 PRURITUS: ICD-10-CM

## 2018-02-05 DIAGNOSIS — M54.2 NECK PAIN: Primary | ICD-10-CM

## 2018-02-05 RX ORDER — DIPHENHYDRAMINE HCL 25 MG
25 CAPSULE ORAL
Qty: 20 CAP | COMMUNITY
Start: 2018-02-05 | End: 2018-02-08 | Stop reason: ALTCHOICE

## 2018-02-05 RX ORDER — TRIAMCINOLONE ACETONIDE 5 MG/G
OINTMENT TOPICAL 2 TIMES DAILY
Qty: 30 G | Refills: 0 | Status: SHIPPED | OUTPATIENT
Start: 2018-02-05 | End: 2018-02-15

## 2018-02-05 NOTE — PROGRESS NOTES
Sean Flores  80 y.o. male  8/15/1923  [address] [place] [id number]  [id number]     [facility]: Progress Note       Encounter Date: 2/5/2018    Chief Complaint   Patient presents with    Neck Pain     itching       History provided by patient  History of Present Illness   Sean Flores is a 80 y.o. male who presents to clinic today for:    Neck pain  Patient present with cc of neck pain x last night at 10PM. Associated with pruritis. Pain has been gradually worsening and is worsen with movement of the neck. At time of exam neck pain has resolved. Patient reports that he attempted an anti-itch cream which did not improve the itching. Denies falls/trauma. Review of Systems   Review of Systems   Eyes: Negative for pain, discharge and redness. Cardiovascular: Negative for chest pain and palpitations. Musculoskeletal: Positive for neck pain. Negative for back pain, falls, joint pain and myalgias. Skin: Positive for itching. Negative for rash. Neurological: Negative for dizziness, sensory change, focal weakness, loss of consciousness and headaches. Vitals/Objective:     Vitals:    02/05/18 0937   BP: 130/60   Pulse: 87   Resp: 20   Temp: 97.9 °F (36.6 °C)   TempSrc: Oral   SpO2: 98%   Weight: 128 lb (58.1 kg)   Height: 5' 7\" (1.702 m)     Body mass index is 20.05 kg/(m^2). Wt Readings from Last 3 Encounters:   02/05/18 128 lb (58.1 kg)   01/26/18 129 lb (58.5 kg)   12/08/17 127 lb 6.4 oz (57.8 kg)       Physical Exam   Constitutional: No distress. HENT:   Head: Normocephalic. Cardiovascular: Normal rate and regular rhythm. Pulmonary/Chest: Effort normal and breath sounds normal.   Musculoskeletal: Normal range of motion. He exhibits no edema or tenderness. Right shoulder: He exhibits no tenderness, no bony tenderness and no swelling.         Left shoulder: He exhibits normal range of motion, no tenderness, no bony tenderness, no swelling and no effusion. Cervical back: Normal.   Skin: He is not diaphoretic. There is erythema. No results found for this or any previous visit (from the past 24 hour(s)). Assessment and Plan:     Encounter Diagnoses     ICD-10-CM ICD-9-CM   1. Neck pain M54.2 723.1   2. Pruritus L29.9 698.9       1. Neck pain  Resolved at time of exam.     2. Pruritus  - diphenhydrAMINE (BENADRYL) 25 mg capsule; Take 1 Cap by mouth every six (6) hours as needed. Indications: Pruritus of Skin  Dispense: 20 Cap  - triamcinolone acetonide (KENALOG) 0.5 % ointment; Apply  to affected area two (2) times a day for 10 days. use thin layer Affected area = posterior neck  Dispense: 30 g; Refill: 0    I have discussed the diagnosis with the patient and the intended plan as seen in the above orders. he has expressed understanding. The patient has received an after-visit summary and questions were answered concerning future plans. I have discussed medication side effects and warnings with the patient as well. Electronically Signed: Opal Rae MD     History/Allergies   Patients past medical, surgical and family histories were reviewed and updated.     Past Medical History:   Diagnosis Date    BPH (benign prostatic hyperplasia) 5/12/2010    CAD (coronary artery disease)     Carotid artery disease ([facility] [address] 7/19/2011    Esophageal reflux 5/12/2010    Gastritis 5/12/2010    Hiatal hernia 5/12/2010    IBS (irritable bowel syndrome)     Melanoma ([facility] [address] 5/12/2010    Pure hypercholesterolemia 5/12/2010    S/P CABG (coronary artery bypass graft)     Tooth fracture 2/14      Past Surgical History:   Procedure Laterality Date    ABDOMEN SURGERY PROC UNLISTED  1990    hernia, right inguinal    DUPLEX CAROTID BILATERAL  7/2011    Mild bilateral disease    ECHO 2D ADULT  2010    normal LV wall motion and ejection fraction, left atrial enlargement, mild aortic regurgitation, mild to moderate mitral regurgitation and mild tricuspid regurgitation. The ejection fraction was 55-60%.  HX CORONARY ARTERY BYPASS GRAFT  1995    LIMA to LAD, SVG to diagonal, SVG to trifurcation vessel and SVG to obtuse marginal.     HX HEART CATHETERIZATION  1995     Left ventricular wall motion is mild hypokinesis of the anterior wall. Ejection Fraction is estimated at 55%. The Coronary Angiography revealed:. LAD 80% stenosis. OM1 80% stenosis. RCA >90% stenosis.  HX HEENT      melanoma surgery x2    HX OTHER SURGICAL      Treadmill stress test 7/26/12 - walked 6:43, no chest pain, (8.0 METS); stopped for SOB; no ischemic EKG changes, frequent PVC's including in couplets. Also one brief run of NSVT (7 beats) during stage 2.     HX OTHER SURGICAL      Carotid dopplers 7/12  show 10-49% stenosis bilat.  HX OTHER SURGICAL      Exercise cardiolite 8/10/12 - walked 7 min without chest pain; EKG with anteroseptal infarct age undetermined; no ischemic EKG changes; short runs (7 beat) of NSVT during exercise. Normal myocardial perfusion and function. LVEF 69%     HX OTHER SURGICAL      Carotid duplex 6/20/14 - mild 10-49% stenosis bilat     STRESS TEST - GXT  7/2011    WNL     Family History   Problem Relation Age of Onset    Heart Disease Mother     Heart Disease Father      Social History     Social History    Marital status:      Spouse name: N/A    Number of children: N/A    Years of education: N/A     Occupational History    Not on file.      Social History Main Topics    Smoking status: Never Smoker    Smokeless tobacco: Never Used    Alcohol use 0.0 oz/week     0 Standard drinks or equivalent per week      Comment: Occasionally    Drug use: No    Sexual activity: Not on file     Other Topics Concern    Not on file     Social History Narrative         No Known Allergies    Disposition     Follow-up Disposition:  Return in about 2 months (around 4/9/2018), or if symptoms worsen or fail to improve, for [employer] with Dr. Tafoya Apo. .    Future Appointments  Date Time Provider Taisha Aguero   8/2/2018 10:20 AM Anais Santillan MD [address] [facility]            Current Medications after this visit     Current Outpatient Prescriptions   Medication Sig    diphenhydrAMINE (BENADRYL) 25 mg capsule Take 1 Cap by mouth every six (6) hours as needed. Indications: Pruritus of Skin    triamcinolone acetonide (KENALOG) 0.5 % ointment Apply  to affected area two (2) times a day for 10 days. use thin layer Affected area = posterior neck    simvastatin (ZOCOR) 10 mg tablet Take 1 Tab by mouth nightly.  colestipol (COLESTID) 1 gram tablet TAKE ONE TABLET BY MOUTH TWICE DAILY    oxybutynin (DITROPAN) 5 mg tablet Take 1 Tab by mouth two (2) times a day.  silodosin (RAPAFLO) 8 mg capsule Take 1 Cap by mouth nightly.  atenolol (TENORMIN) 25 mg tablet TAKE 1/2 TABLET DAILY    food supplemt, lactose-reduced (BOOST HIGH PROTEIN) liqd Take 237 mL by mouth three (3) times daily.  albuterol (PROAIR HFA) 90 mcg/actuation inhaler Take 1 Puff by inhalation every four (4) hours as needed for Wheezing (or coughing spasms).  NITROSTAT 0.4 mg SL tablet PLACE 1 TABLET SUBLINGUAL EVERY 5 MINUTES AS NEEDED    LACTOBACILLUS RHAMNOSUS GG (PROBIOTIC PO) Take  by mouth two (2) times a day.  Cholecalciferol, Vitamin D3, (VITAMIN D) 1,000 unit Cap Take  by mouth daily.  aspirin delayed-release 81 mg tablet Take 81 mg by mouth daily.  cyanocobalamin (VITAMIN B-12) 1,000 mcg tablet Take 1,000 mcg by mouth daily.  triamcinolone (KENALOG) 0.1 % lotion Apply  to affected area three (3) times daily. use thin layer     No current facility-administered medications for this visit.       Medications Discontinued During This Encounter   Medication Reason    bismuth subsalicylate (PEPTO-BISMOL) 262 mg chew Not A Current Medication    finasteride (PROSCAR) 5 mg tablet Not A Current Medication

## 2018-02-05 NOTE — PATIENT INSTRUCTIONS
Eucerin lotion for dry skin. Dermatitis: Care Instructions  Your Care Instructions  Dermatitis is the general name used for any rash or inflammation of the skin. Different kinds of dermatitis cause different kinds of rashes. Common causes of a rash include new medicines, plants (such as poison oak or poison ivy), heat, and stress. Certain illnesses can also cause a rash. An allergic reaction to something that touches your skin, such as latex, nickel, or poison ivy, is called contact dermatitis. Contact dermatitis may also be caused by something that irritates the skin, such as bleach, a chemical, or soap. These types of rashes cannot be spread from person to person. How long your rash will last depends on what caused it. Rashes may last a few days or months. Follow-up care is a key part of your treatment and safety. Be sure to make and go to all appointments, and call your doctor if you are having problems. It's also a good idea to know your test results and keep a list of the medicines you take. How can you care for yourself at home? · Do not scratch the rash. Cut your nails short, and file them smooth. Or wear gloves if this helps keep you from scratching. · Wash the area with water only. Pat dry. · Put cold, wet cloths on the rash to reduce itching. · Keep cool, and stay out of the sun. · Leave the rash open to the air as much as possible. · If the rash itches, use hydrocortisone cream. Follow the directions on the label. Calamine lotion may help for plant rashes. · Take an over-the-counter antihistamine, such as diphenhydramine (Benadryl) or loratadine (Claritin), to help calm the itching. Read and follow all instructions on the label. · If your doctor prescribed a cream, use it as directed. If your doctor prescribed medicine, take it exactly as directed. When should you call for help?   Call your doctor now or seek immediate medical care if:  ? · You have symptoms of infection, such as:  ¨ Increased pain, swelling, warmth, or redness. ¨ Red streaks leading from the area. ¨ Pus draining from the area. ¨ A fever. ? · You have joint pain along with the rash. ? Watch closely for changes in your health, and be sure to contact your doctor if:  ? · Your rash is changing or getting worse. ? · You are not getting better as expected. Where can you learn more? Go to http://vipin-josé miguel.info/. Enter ([phone] in the search box to learn more about \"Dermatitis: Care Instructions. \"  Current as of: October 13, 2016  Content Version: 11.4  © 1189-9832 [employer]. Care instructions adapted under license by [employer] (which disclaims liability or warranty for this information). If you have questions about a medical condition or this instruction, always ask your healthcare professional. [place] [address] any warranty or liability for your use of this information.

## 2018-02-05 NOTE — MR AVS SNAPSHOT
[address] [address] 
[phone] Patient: Dodie Cook MRN: [id number] :8/15/1923 Visit Information Date & Time Provider Department Dept. Phone Encounter #  
 2018  9:30 AM Jolene Perkins MD [address] [place] [address] [phone] [id number] Follow-up Instructions Return in about 2 months (around 2018), or if symptoms worsen or fail to improve, for medicare Wellness with Dr. Gayle Alcala. .  
  
Your Appointments 2018 10:20 AM  
ESTABLISHED PATIENT with Regina Boggs MD  
[facility] ([address]) Appt Note: 6 mo fu  
 354 [facility] 600 1007 [facility]  
54 [facility] [address] [place] [address] Upcoming Health Maintenance Date Due  
 MEDICARE YEARLY EXAM 2017 GLAUCOMA SCREENING Q2Y 2018 DTaP/Tdap/Td series (2 - Td) 2026 Allergies as of 2018  Review Complete On: 2018 By: Tanya Bower No Known Allergies Current Immunizations  Reviewed on 2016 Name Date Influenza High Dose Vaccine PF 2017 Influenza Vaccine 10/7/2015, 10/14/2014, 2013 Influenza Vaccine (Quad) PF 2016 Influenza Vaccine Split 10/19/2011, 10/19/2005 PPD 2009 Pneumococcal Conjugate (PCV-13) 2015 TD Vaccine 2010, 5/15/2006, 10/22/2002 Tdap 2016 ZZZ-RETIRED (DO NOT USE) Pneumococcal Vaccine (Unspecified Type) 2010, 2000 Not reviewed this visit You Were Diagnosed With   
  
 Codes Comments Neck pain    -  Primary ICD-10-CM: M54.2 ICD-9-CM: 723.1 Pruritus     ICD-10-CM: L29.9 ICD-9-CM: 698.9 Vitals BP Pulse Temp Resp Height(growth percentile) Weight(growth percentile)  130/60 (BP 1 Location: Right arm, BP Patient Position: Sitting) 87 97.9 °F (36.6 °C) (Oral) 20 5' 7\" (1.702 m) 128 lb (58.1 kg) SpO2 BMI Smoking Status 98% 20.05 kg/m2 Never Smoker Vitals History BMI and BSA Data Body Mass Index Body Surface Area 20.05 kg/m 2 1.66 m 2 Preferred Pharmacy Pharmacy Name Phone [facility] [place] [address] [phone] Your Updated Medication List  
  
   
This list is accurate as of: 2/5/18  9:54 AM.  Always use your most recent med list.  
  
  
  
  
 albuterol 90 mcg/actuation inhaler Commonly known as:  PROAIR HFA Take 1 Puff by inhalation every four (4) hours as needed for Wheezing (or coughing spasms). aspirin delayed-release 81 mg tablet Take 81 mg by mouth daily. atenolol 25 mg tablet Commonly known as:  TENORMIN  
TAKE 1/2 TABLET DAILY  
  
 BENADRYL 25 mg capsule Generic drug:  diphenhydrAMINE Take 1 Cap by mouth every six (6) hours as needed. Indications: Pruritus of Skin BOOST HIGH PROTEIN Liqd Generic drug:  food supplemt, lactose-reduced Take 237 mL by mouth three (3) times daily. colestipol 1 gram tablet Commonly known as:  COLESTID  
TAKE ONE TABLET BY MOUTH TWICE DAILY NITROSTAT 0.4 mg SL tablet Generic drug:  nitroglycerin PLACE 1 TABLET SUBLINGUAL EVERY 5 MINUTES AS NEEDED  
  
 oxybutynin 5 mg tablet Commonly known as:  WMULXRYZ Take 1 Tab by mouth two (2) times a day. PROBIOTIC PO Take  by mouth two (2) times a day. silodosin 8 mg capsule Commonly known as:  RAPAFLO Take 1 Cap by mouth nightly. simvastatin 10 mg tablet Commonly known as:  ZOCOR Take 1 Tab by mouth nightly. * triamcinolone 0.1 % lotion Commonly known as:  KENALOG Apply  to affected area three (3) times daily. use thin layer * triamcinolone acetonide 0.5 % ointment Commonly known as:  KENALOG Apply  to affected area two (2) times a day for 10 days.  use thin layer Affected area = posterior neck VITAMIN B-12 1,000 mcg tablet Generic drug:  cyanocobalamin Take 1,000 mcg by mouth daily. VITAMIN D3 1,000 unit Cap Generic drug:  cholecalciferol Take  by mouth daily. * Notice: This list has 2 medication(s) that are the same as other medications prescribed for you. Read the directions carefully, and ask your doctor or other care provider to review them with you. Prescriptions Sent to Pharmacy Refills  
 triamcinolone acetonide (KENALOG) 0.5 % ointment 0 Sig: Apply  to affected area two (2) times a day for 10 days. use thin layer Affected area = posterior neck Class: Normal  
 Pharmacy: [address] [place] [address] #: [phone] Route: Topical  
  
Follow-up Instructions Return in about 2 months (around 4/9/2018), or if symptoms worsen or fail to improve, for medicare Wellness with Dr. Scooter Maldonado .  
  
  
Patient Instructions Eucerin lotion for dry skin. Dermatitis: Care Instructions Your Care Instructions Dermatitis is the general name used for any rash or inflammation of the skin. Different kinds of dermatitis cause different kinds of rashes. Common causes of a rash include new medicines, plants (such as poison oak or poison ivy), heat, and stress. Certain illnesses can also cause a rash. An allergic reaction to something that touches your skin, such as latex, nickel, or poison ivy, is called contact dermatitis. Contact dermatitis may also be caused by something that irritates the skin, such as bleach, a chemical, or soap. These types of rashes cannot be spread from person to person. How long your rash will last depends on what caused it. Rashes may last a few days or months. Follow-up care is a key part of your treatment and safety.  Be sure to make and go to all appointments, and call your doctor if you are having problems. It's also a good idea to know your test results and keep a list of the medicines you take. How can you care for yourself at home? · Do not scratch the rash. Cut your nails short, and file them smooth. Or wear gloves if this helps keep you from scratching. · Wash the area with water only. Pat dry. · Put cold, wet cloths on the rash to reduce itching. · Keep cool, and stay out of the sun. · Leave the rash open to the air as much as possible. · If the rash itches, use hydrocortisone cream. Follow the directions on the label. Calamine lotion may help for plant rashes. · Take an over-the-counter antihistamine, such as diphenhydramine (Benadryl) or loratadine (Claritin), to help calm the itching. Read and follow all instructions on the label. · If your doctor prescribed a cream, use it as directed. If your doctor prescribed medicine, take it exactly as directed. When should you call for help? Call your doctor now or seek immediate medical care if: 
? · You have symptoms of infection, such as: 
¨ Increased pain, swelling, warmth, or redness. ¨ Red streaks leading from the area. ¨ Pus draining from the area. ¨ A fever. ? · You have joint pain along with the rash. ? Watch closely for changes in your health, and be sure to contact your doctor if: 
? · Your rash is changing or getting worse. ? · You are not getting better as expected. Where can you learn more? Go to http://vipin-josé miguel.info/. Enter (28) 5186 6918 in the search box to learn more about \"Dermatitis: Care Instructions. \" Current as of: October 13, 2016 Content Version: 11.4 © 8980-7409 [employer]. Care instructions adapted under license by [employer] (which disclaims liability or warranty for this information).  If you have questions about a medical condition or this instruction, always ask your healthcare professional. [employer] disclaims any warranty or liability for your use of this information. Introducing [facility]! Dear Jocy Loco: Thank you for requesting a [employer] account. Our records indicate that you have previously registered for a [employer] account but its currently inactive. Please call our [employer] support line at [phone]. Additional Information If you have questions, please visit the Frequently Asked Questions section of the [employer] website at [email or web address] Remember, [employer] is NOT to be used for urgent needs. For medical emergencies, dial 911. Now available from your iPhone and Android! Please provide this summary of care documentation to your next provider. Your primary care clinician is listed as [address]. If you have any questions after today's visit, please call [phone].

## 2018-02-08 ENCOUNTER — OFFICE VISIT (OUTPATIENT)
Dept: FAMILY MEDICINE CLINIC | Age: 83
End: 2018-02-08

## 2018-02-08 VITALS
HEART RATE: 81 BPM | SYSTOLIC BLOOD PRESSURE: 127 MMHG | BODY MASS INDEX: 20.25 KG/M2 | HEIGHT: 67 IN | TEMPERATURE: 98.2 F | OXYGEN SATURATION: 98 % | WEIGHT: 129 LBS | DIASTOLIC BLOOD PRESSURE: 55 MMHG | RESPIRATION RATE: 18 BRPM

## 2018-02-08 DIAGNOSIS — H61.23 BILATERAL IMPACTED CERUMEN: ICD-10-CM

## 2018-02-08 DIAGNOSIS — R68.89 FLU-LIKE SYMPTOMS: Primary | ICD-10-CM

## 2018-02-08 DIAGNOSIS — R06.02 SOB (SHORTNESS OF BREATH): ICD-10-CM

## 2018-02-08 DIAGNOSIS — R05.9 COUGH: ICD-10-CM

## 2018-02-08 RX ORDER — OSELTAMIVIR PHOSPHATE 75 MG/1
75 CAPSULE ORAL 2 TIMES DAILY
Qty: 10 CAP | Refills: 0 | Status: SHIPPED | OUTPATIENT
Start: 2018-02-08 | End: 2018-02-13

## 2018-02-08 NOTE — PROGRESS NOTES
1. Have you been to the ER, urgent care clinic, or been hospitalized since your last visit? No     2. Have you seen or consulted any other health care providers outside of the [address] [place] [address] since your last visit?  No     Reviewed record in preparation for visit and have necessary documentation  opportunity was given for questions  Goals that were addressed and/or need to be completed during or after this appointment include     Health Maintenance Due   Topic Date Due    MEDICARE YEARLY EXAM  04/07/2017

## 2018-02-08 NOTE — PROGRESS NOTES
Verenice Ulloa  80 y.o. male  8/15/1923  [address] [place] [id number]  [id number]     [facility]: Progress Note       Encounter Date: 2/8/2018    Chief Complaint   Patient presents with    Cough    Sore Throat       History provided by patient  History of Present Illness   Verenice Ulloa is a 80 y.o. male who presents to clinic today for:    Cough and sore throat  Patient present with cc of cough and sore throat x  Yesterday. Patient was in the office on 2/5/2018 for skin issues. Cough is dry though he feels that it has gone \"to my chest.\" Denies fever, chills,  N/V/D. Patient has been taking robitussin for symptoms. Health Maintenance  Patient is ill. Health Maintenance Due   Topic Date Due    MEDICARE YEARLY EXAM  04/07/2017     Review of Systems   Review of Systems   Constitutional: Negative for chills and fever. HENT: Positive for congestion and sore throat. Negative for ear discharge, ear pain and sinus pain. Eyes: Negative for pain, discharge and redness. Respiratory: Positive for cough. Negative for sputum production, shortness of breath, wheezing and stridor. Cardiovascular: Negative for chest pain and palpitations. Gastrointestinal: Negative for abdominal pain, constipation, diarrhea, nausea and vomiting. Genitourinary: Negative for dysuria and urgency. Neurological: Negative for dizziness and headaches. Vitals/Objective:     Vitals:    02/08/18 0941   BP: 127/55   Pulse: 81   Resp: 18   Temp: 98.2 °F (36.8 °C)   TempSrc: Oral   SpO2: 98%   Weight: 129 lb (58.5 kg)   Height: 5' 7\" (1.702 m)     Body mass index is 20.2 kg/(m^2). Wt Readings from Last 3 Encounters:   02/08/18 129 lb (58.5 kg)   02/05/18 128 lb (58.1 kg)   01/26/18 129 lb (58.5 kg)       Physical Exam   Constitutional: He appears well-developed and well-nourished. No distress. HENT:   Right Ear: No lacerations. No swelling or tenderness. A foreign body (cerumen) is present.  No mastoid tenderness. No middle ear effusion. Decreased hearing (improved after cerumen removal) is noted. Left Ear: No lacerations. No swelling or tenderness. A foreign body (cerumen) is present. No mastoid tenderness. No middle ear effusion. Decreased hearing (improved after cerumen removal) is noted. Mouth/Throat: Oropharynx is clear and moist. No oropharyngeal exudate. Eyes: Conjunctivae are normal. Pupils are equal, round, and reactive to light. Neck: Normal range of motion. Cardiovascular: Normal rate and regular rhythm. No murmur heard. Lymphadenopathy:     He has no cervical adenopathy. Skin: He is not diaphoretic. No results found for this or any previous visit (from the past 24 hour(s)). Assessment and Plan:     Encounter Diagnoses     ICD-10-CM ICD-9-CM   1. Flu-like symptoms R68.89 780.99   2. Cough R05 786.2   3. SOB (shortness of breath) R06.02 786.05   4. Bilateral impacted cerumen H61.23 380.4       1. Flu-like symptoms  Patient is in high risk group for possible complications from influenza infection. Has no tests are available and patient's symptoms are similar will empirically treat him. - oseltamivir (TAMIFLU) 75 mg capsule; Take 1 Cap by mouth two (2) times a day for 5 days. Dispense: 10 Cap; Refill: 0    2. Cough  3. SOB (shortness of breath)  I personally reviewed the cxr and noted no acute process. - XR CHEST PA LAT; Future    4. Bilateral impacted cerumen  - AR REMOVAL IMPACTED CERUMEN IRRIGATION/LVG UNILAT  - AR REMOVAL IMPACTED CERUMEN IRRIGATION/LVG UNILAT    I have discussed the diagnosis with the patient and the intended plan as seen in the above orders. he has expressed understanding. The patient has received an after-visit summary and questions were answered concerning future plans. I have discussed medication side effects and warnings with the patient as well.     Electronically Signed: Tomeka Foley MD     History/Allergies   Patients past medical, surgical and family histories were reviewed and updated. Past Medical History:   Diagnosis Date    BPH (benign prostatic hyperplasia) 5/12/2010    CAD (coronary artery disease)     Carotid artery disease ([address] 7/19/2011    Esophageal reflux 5/12/2010    Gastritis 5/12/2010    Hiatal hernia 5/12/2010    IBS (irritable bowel syndrome)     Melanoma ([address] 5/12/2010    Pure hypercholesterolemia 5/12/2010    S/P CABG (coronary artery bypass graft)     Tooth fracture 2/14      Past Surgical History:   Procedure Laterality Date    ABDOMEN SURGERY PROC UNLISTED  1990    hernia, right inguinal    DUPLEX CAROTID BILATERAL  7/2011    Mild bilateral disease    ECHO 2D ADULT  2010    normal LV wall motion and ejection fraction, left atrial enlargement, mild aortic regurgitation, mild to moderate mitral regurgitation and mild tricuspid regurgitation. The ejection fraction was 55-60%.  HX CORONARY ARTERY BYPASS GRAFT  1995    LIMA to LAD, SVG to diagonal, SVG to trifurcation vessel and SVG to obtuse marginal.     HX HEART CATHETERIZATION  1995     Left ventricular wall motion is mild hypokinesis of the anterior wall. Ejection Fraction is estimated at 55%. The Coronary Angiography revealed:. LAD 80% stenosis. OM1 80% stenosis. RCA >90% stenosis.  HX HEENT      melanoma surgery x2    HX OTHER SURGICAL      Treadmill stress test 7/26/12 - walked 6:43, no chest pain, (8.0 METS); stopped for SOB; no ischemic EKG changes, frequent PVC's including in couplets. Also one brief run of NSVT (7 beats) during stage 2.     HX OTHER SURGICAL      Carotid dopplers 7/12  show 10-49% stenosis bilat.  HX OTHER SURGICAL      Exercise cardiolite 8/10/12 - walked 7 min without chest pain; EKG with anteroseptal infarct age undetermined; no ischemic EKG changes; short runs (7 beat) of NSVT during exercise. Normal myocardial perfusion and function.  LVEF 69%     HX OTHER SURGICAL      Carotid duplex 6/20/14 - mild 10-49% stenosis bilat     STRESS TEST - GXT  7/2011    WNL     Family History   Problem Relation Age of Onset    Heart Disease Mother     Heart Disease Father      Social History     Social History    Marital status:      Spouse name: N/A    Number of children: N/A    Years of education: N/A     Occupational History    Not on file. Social History Main Topics    Smoking status: Never Smoker    Smokeless tobacco: Never Used    Alcohol use 0.0 oz/week     0 Standard drinks or equivalent per week      Comment: Occasionally    Drug use: No    Sexual activity: Not on file     Other Topics Concern    Not on file     Social History Narrative         No Known Allergies    Disposition     Follow-up Disposition:  Return in about 4 weeks (around 3/8/2018), or if symptoms worsen or fail to improve, for medicare Wellness with Dr. Oliva Solis. Future Appointments  Date Time Provider Taisha Aguero   8/2/2018 10:20 AM Amanda Reed MD [facility]            Current Medications after this visit     Current Outpatient Prescriptions   Medication Sig    oseltamivir (TAMIFLU) 75 mg capsule Take 1 Cap by mouth two (2) times a day for 5 days.  diphenhydrAMINE (BENADRYL) 25 mg capsule Take 1 Cap by mouth every six (6) hours as needed. Indications: Pruritus of Skin    triamcinolone acetonide (KENALOG) 0.5 % ointment Apply  to affected area two (2) times a day for 10 days. use thin layer Affected area = posterior neck    triamcinolone (KENALOG) 0.1 % lotion Apply  to affected area three (3) times daily. use thin layer    simvastatin (ZOCOR) 10 mg tablet Take 1 Tab by mouth nightly.  colestipol (COLESTID) 1 gram tablet TAKE ONE TABLET BY MOUTH TWICE DAILY    oxybutynin (DITROPAN) 5 mg tablet Take 1 Tab by mouth two (2) times a day.  silodosin (RAPAFLO) 8 mg capsule Take 1 Cap by mouth nightly.     atenolol (TENORMIN) 25 mg tablet TAKE 1/2 TABLET DAILY    food supplemt, lactose-reduced (BOOST HIGH PROTEIN) liqd Take 237 mL by mouth three (3) times daily.  albuterol (PROAIR HFA) 90 mcg/actuation inhaler Take 1 Puff by inhalation every four (4) hours as needed for Wheezing (or coughing spasms).  NITROSTAT 0.4 mg SL tablet PLACE 1 TABLET SUBLINGUAL EVERY 5 MINUTES AS NEEDED    LACTOBACILLUS RHAMNOSUS GG (PROBIOTIC PO) Take  by mouth two (2) times a day.  Cholecalciferol, Vitamin D3, (VITAMIN D) 1,000 unit Cap Take  by mouth daily.  aspirin delayed-release 81 mg tablet Take 81 mg by mouth daily.  cyanocobalamin (VITAMIN B-12) 1,000 mcg tablet Take 1,000 mcg by mouth daily. No current facility-administered medications for this visit. There are no discontinued medications.

## 2018-02-08 NOTE — PATIENT INSTRUCTIONS

## 2018-02-08 NOTE — MR AVS SNAPSHOT
[address] [address] 
[phone] Patient: Dodie Cook MRN: [id number] :8/15/1923 Visit Information Date & Time Provider Department Dept. Phone Encounter #  
 2018  9:30 AM Jolene Perkins  [facility] [phone] [id number] Follow-up Instructions Return in about 4 weeks (around 3/8/2018), or if symptoms worsen or fail to improve, for medicare Wellness with Dr. Gayle Alcala. Your Appointments 2018 10:20 AM  
ESTABLISHED PATIENT with Regina Boggs MD  
[facility] ([facility]) Appt Note: 6 mo fu  
 [address] [facility] [address] [address] [address] Upcoming Health Maintenance Date Due  
 MEDICARE YEARLY EXAM 2017 GLAUCOMA SCREENING Q2Y 2018 DTaP/Tdap/Td series (2 - Td) 2026 Allergies as of 2018  Review Complete On: 2018 By: Lizzy Grady LPN No Known Allergies Current Immunizations  Reviewed on 2016 Name Date Influenza High Dose Vaccine PF 2017 Influenza Vaccine 10/7/2015, 10/14/2014, 2013 Influenza Vaccine (Quad) PF 2016 Influenza Vaccine Split 10/19/2011, 10/19/2005 PPD 2009 Pneumococcal Conjugate (PCV-13) 2015 TD Vaccine 2010, 5/15/2006, 10/22/2002 Tdap 2016 ZZZ-RETIRED (DO NOT USE) Pneumococcal Vaccine (Unspecified Type) 2010, 2000 Not reviewed this visit You Were Diagnosed With   
  
 Codes Comments Flu-like symptoms    -  Primary ICD-10-CM: R68.89 ICD-9-CM: 780.99 Cough     ICD-10-CM: R05 ICD-9-CM: 786.2 SOB (shortness of breath)     ICD-10-CM: R06.02 
ICD-9-CM: 786.05 Bilateral impacted cerumen     ICD-10-CM: H61.23 
ICD-9-CM: 380.4 Vitals BP Pulse Temp Resp Height(growth percentile) Weight(growth percentile) 127/55 81 98.2 °F (36.8 °C) (Oral) 18 5' 7\" (1.702 m) 129 lb (58.5 kg) SpO2 BMI Smoking Status 98% 20.2 kg/m2 Never Smoker Vitals History BMI and BSA Data Body Mass Index Body Surface Area  
 20.2 kg/m 2 1.66 m 2 Preferred Pharmacy Pharmacy Name Phone [address] [place], [place] - [address] [phone] Your Updated Medication List  
  
   
This list is accurate as of: 2/8/18 10:29 AM.  Always use your most recent med list.  
  
  
  
  
 albuterol 90 mcg/actuation inhaler Commonly known as:  PROAIR HFA Take 1 Puff by inhalation every four (4) hours as needed for Wheezing (or coughing spasms). aspirin delayed-release 81 mg tablet Take 81 mg by mouth daily. atenolol 25 mg tablet Commonly known as:  TENORMIN  
TAKE 1/2 TABLET DAILY  
  
 BENADRYL 25 mg capsule Generic drug:  diphenhydrAMINE Take 1 Cap by mouth every six (6) hours as needed. Indications: Pruritus of Skin BOOST HIGH PROTEIN Liqd Generic drug:  food supplemt, lactose-reduced Take 237 mL by mouth three (3) times daily. colestipol 1 gram tablet Commonly known as:  COLESTID  
TAKE ONE TABLET BY MOUTH TWICE DAILY NITROSTAT 0.4 mg SL tablet Generic drug:  nitroglycerin PLACE 1 TABLET SUBLINGUAL EVERY 5 MINUTES AS NEEDED  
  
 oseltamivir 75 mg capsule Commonly known as:  TAMIFLU Take 1 Cap by mouth two (2) times a day for 5 days. oxybutynin 5 mg tablet Commonly known as:  XALBNXHT Take 1 Tab by mouth two (2) times a day. PROBIOTIC PO Take  by mouth two (2) times a day. silodosin 8 mg capsule Commonly known as:  RAPAFLO Take 1 Cap by mouth nightly. simvastatin 10 mg tablet Commonly known as:  ZOCOR Take 1 Tab by mouth nightly. * triamcinolone 0.1 % lotion Commonly known as:  KENALOG Apply  to affected area three (3) times daily. use thin layer * triamcinolone acetonide 0.5 % ointment Commonly known as:  KENALOG Apply  to affected area two (2) times a day for 10 days. use thin layer Affected area = posterior neck VITAMIN B-12 1,000 mcg tablet Generic drug:  cyanocobalamin Take 1,000 mcg by mouth daily. VITAMIN D3 1,000 unit Cap Generic drug:  cholecalciferol Take  by mouth daily. * Notice: This list has 2 medication(s) that are the same as other medications prescribed for you. Read the directions carefully, and ask your doctor or other care provider to review them with you. Prescriptions Sent to Pharmacy Refills  
 oseltamivir (TAMIFLU) 75 mg capsule 0 Sig: Take 1 Cap by mouth two (2) times a day for 5 days. Class: Normal  
 Pharmacy: [address] [place] [address] #: [phone] Route: Oral  
  
Follow-up Instructions Return in about 4 weeks (around 3/8/2018), or if symptoms worsen or fail to improve, for medicare Wellness with Dr. Arlette Santos. To-Do List   
 02/08/2018 Imaging:  XR CHEST PA LAT Patient Instructions Influenza (Flu): Care Instructions Your Care Instructions Influenza (flu) is an infection in the lungs and breathing passages. It is caused by the influenza virus. There are different strains, or types, of the flu virus from year to year. Unlike the common cold, the flu comes on suddenly and the symptoms, such as a cough, congestion, fever, chills, fatigue, aches, and pains, are more severe. These symptoms may last up to 10 days. Although the flu can make you feel very sick, it usually doesn't cause serious health problems. Home treatment is usually all you need for flu symptoms. But your doctor may prescribe antiviral medicine to prevent other health problems, such as pneumonia, from developing.  Older people and those who have a long-term health condition, such as lung disease, are most at risk for having pneumonia or other health problems. Follow-up care is a key part of your treatment and safety. Be sure to make and go to all appointments, and call your doctor if you are having problems. It's also a good idea to know your test results and keep a list of the medicines you take. How can you care for yourself at home? · Get plenty of rest. 
· Drink plenty of fluids, enough so that your urine is light yellow or clear like water. If you have kidney, heart, or liver disease and have to limit fluids, talk with your doctor before you increase the amount of fluids you drink. · Take an over-the-counter pain medicine if needed, such as acetaminophen (Tylenol), ibuprofen (Advil, Motrin), or naproxen (Aleve), to relieve fever, headache, and muscle aches. Read and follow all instructions on the label. No one younger than 20 should take aspirin. It has been linked to Reye syndrome, a serious illness. · Do not smoke. Smoking can make the flu worse. If you need help quitting, talk to your doctor about stop-smoking programs and medicines. These can increase your chances of quitting for good. · Breathe moist air from a hot shower or from a sink filled with hot water to help clear a stuffy nose. · Before you use cough and cold medicines, check the label. These medicines may not be safe for young children or for people with certain health problems. · If the skin around your nose and lips becomes sore, put some petroleum jelly on the area. · To ease coughing: ¨ Drink fluids to soothe a scratchy throat. ¨ Suck on cough drops or plain hard candy. ¨ Take an over-the-counter cough medicine that contains dextromethorphan to help you get some sleep. Read and follow all instructions on the label. ¨ Raise your head at night with an extra pillow. This may help you rest if coughing keeps you awake. · Take any prescribed medicine exactly as directed. Call your doctor if you think you are having a problem with your medicine. To avoid spreading the flu · Wash your hands regularly, and keep your hands away from your face. · Stay home from school, work, and other public places until you are feeling better and your fever has been gone for at least 24 hours. The fever needs to have gone away on its own without the help of medicine. · Ask people living with you to talk to their doctors about preventing the flu. They may get antiviral medicine to keep from getting the flu from you. · To prevent the flu in the future, get a flu vaccine every fall. Encourage people living with you to get the vaccine. · Cover your mouth when you cough or sneeze. When should you call for help? Call 911 anytime you think you may need emergency care. For example, call if: 
? · You have severe trouble breathing. ?Call your doctor now or seek immediate medical care if: 
? · You have new or worse trouble breathing. ? · You seem to be getting much sicker. ? · You feel very sleepy or confused. ? · You have a new or higher fever. ? · You get a new rash. ? Watch closely for changes in your health, and be sure to contact your doctor if: 
? · You begin to get better and then get worse. ? · You are not getting better after 1 week. Where can you learn more? Go to http://vipin-josé miguel.info/. Enter H724 in the search box to learn more about \"Influenza (Flu): Care Instructions. \" Current as of: May 12, 2017 Content Version: 11.4 © 5057-8382 [employer]. Care instructions adapted under license by [employer] (which disclaims liability or warranty for this information). If you have questions about a medical condition or this instruction, always ask your healthcare professional. [address] any warranty or liability for your use of this information. Introducing [facility] & HE[facility]! Dear Yolette Beaver: Thank you for requesting a BoxC account. Our records indicate that you have previously registered for a BoxC account but its currently inactive. Please call our BoxC support line at [phone]. Additional Information If you have questions, please visit the Frequently Asked Questions section of the BoxC website at [email or web address] Remember, BoxC is NOT to be used for urgent needs. For medical emergencies, dial 911. Now available from your iPhone and Android! Please provide this summary of care documentation to your next provider. Your primary care clinician is listed as [address]. If you have any questions after today's visit, please call [phone].

## 2018-02-23 DIAGNOSIS — I25.10 CORONARY ARTERY DISEASE INVOLVING NATIVE CORONARY ARTERY OF NATIVE HEART WITHOUT ANGINA PECTORIS: Chronic | ICD-10-CM

## 2018-02-23 RX ORDER — ATENOLOL 25 MG/1
TABLET ORAL
Qty: 45 TAB | Refills: 3 | Status: SHIPPED | OUTPATIENT
Start: 2018-02-23 | End: 2019-01-17 | Stop reason: SDUPTHER

## 2018-03-07 RX ORDER — SILODOSIN 8 MG/1
8 CAPSULE ORAL
Qty: 90 CAP | Refills: 2 | Status: SHIPPED | OUTPATIENT
Start: 2018-03-07 | End: 2018-12-18 | Stop reason: SDUPTHER

## 2018-03-19 ENCOUNTER — OFFICE VISIT (OUTPATIENT)
Dept: FAMILY MEDICINE CLINIC | Age: 83
End: 2018-03-19

## 2018-03-19 VITALS
SYSTOLIC BLOOD PRESSURE: 122 MMHG | OXYGEN SATURATION: 97 % | RESPIRATION RATE: 16 BRPM | HEIGHT: 67 IN | WEIGHT: 126 LBS | HEART RATE: 62 BPM | TEMPERATURE: 97.2 F | DIASTOLIC BLOOD PRESSURE: 59 MMHG | BODY MASS INDEX: 19.78 KG/M2

## 2018-03-19 DIAGNOSIS — S41.102A ARM WOUND, LEFT, INITIAL ENCOUNTER: Primary | ICD-10-CM

## 2018-03-19 DIAGNOSIS — Z85.820 HISTORY OF MELANOMA: ICD-10-CM

## 2018-03-19 NOTE — PROGRESS NOTES
CC: Insect bite    HPI: Pt is a 80 y.o. male who presents for insect bite. About a week ago he got a bite on his upper left arm while he was sleeping. He scratched the area and it caused a small excoriated wound. He has been putting Bacitracin ointment on the area but is concerned that it is not healing. Denies fevers and drainage and the area is not painful. Of note, he has a h/o melanoma. Past Medical History:   Diagnosis Date    BPH (benign prostatic hyperplasia) 5/12/2010    CAD (coronary artery disease)     Carotid artery disease ([facility] 7/19/2011    Esophageal reflux 5/12/2010    Gastritis 5/12/2010    Hiatal hernia 5/12/2010    IBS (irritable bowel syndrome)     Melanoma ([facility] 5/12/2010    Pure hypercholesterolemia 5/12/2010    S/P CABG (coronary artery bypass graft)     Tooth fracture 2/14       Family History   Problem Relation Age of Onset    Heart Disease Mother     Heart Disease Father        Social History   Substance Use Topics    Smoking status: Never Smoker    Smokeless tobacco: Never Used    Alcohol use 0.0 oz/week     0 Standard drinks or equivalent per week      Comment: Occasionally       ROS:  Positive only when bolded  Constitutional: F/C  Respiratory: SOB, wheezing, cough  Cardiovascular: CP, palpitations  Integument/breast: Changes in skin, moles or hair, rash    PE:  Visit Vitals    /59 (BP 1 Location: Right arm, BP Patient Position: Sitting)    Pulse 62    Temp 97.2 °F (36.2 °C) (Oral)    Resp 16    Ht 5' 7\" (1.702 m)    Wt 126 lb (57.2 kg)    SpO2 97%    BMI 19.73 kg/m2     Gen: Pt sitting in chair, in NAD  Head: Normocephalic, atraumatic  Eyes: Sclera anicteric, EOM grossly intact, PERRL  Throat: MMM  Neck: Supple  CVS: Normal S1, S2, no m/r/g  Resp: CTAB, no wheezes or rales  Extrem: No cyanosis or edema  Pulses: 2+   Skin: Warm, dry. 1.5cm shallow skin ulceration on left upper arm without surrounding erythema or drainage.    Neuro: Alert, appropriate, hard of hearing. A/P: Pt is a 80 y.o. male who presents for non-healing wound. Discussed with pt, the area is not infected and does not look c/w skin cancer. Delayed healing is likely 2/2 his age with thin skin and poor blood flow. Given his concern will prescribe some Santyl to help with healing.   - Santyl  - RTC prn if symptoms worsen or fail to improve. Pt advised to call in 1 week if there is no improvement or if he develops any signs of infection. Discussed diagnoses in detail with patient. Medication risks/benefits/side effects discussed with patient. All of the patient's questions were addressed. The patient understands and agrees with our plan of care. The patient knows to call back if they are unsure of or forget any changes we discussed today or if the symptoms change. The patient received an After-Visit Summary which contains VS, orders, medication list and allergy list. This can be used as a \"mini-medical record\" should they have to seek medical care while out of town. Current Outpatient Prescriptions on File Prior to Visit   Medication Sig Dispense Refill    silodosin (RAPAFLO) 8 mg capsule Take 1 Cap by mouth nightly. 90 Cap 2    atenolol (TENORMIN) 25 mg tablet TAKE 1/2 TABLET DAILY 45 Tab 3    simvastatin (ZOCOR) 10 mg tablet Take 1 Tab by mouth nightly. 90 Tab 1    colestipol (COLESTID) 1 gram tablet TAKE ONE TABLET BY MOUTH TWICE DAILY 60 Tab 11    oxybutynin (DITROPAN) 5 mg tablet Take 1 Tab by mouth two (2) times a day. 60 Tab 5    food supplemt, lactose-reduced (BOOST HIGH PROTEIN) liqd Take 237 mL by mouth three (3) times daily.  albuterol (PROAIR HFA) 90 mcg/actuation inhaler Take 1 Puff by inhalation every four (4) hours as needed for Wheezing (or coughing spasms). 1 Inhaler 2    NITROSTAT 0.4 mg SL tablet PLACE 1 TABLET SUBLINGUAL EVERY 5 MINUTES AS NEEDED 25 Tab 1    LACTOBACILLUS RHAMNOSUS GG (PROBIOTIC PO) Take  by mouth two (2) times a day.       Cholecalciferol, Vitamin D3, (VITAMIN D) 1,000 unit Cap Take  by mouth daily.  aspirin delayed-release 81 mg tablet Take 81 mg by mouth daily.  cyanocobalamin (VITAMIN B-12) 1,000 mcg tablet Take 1,000 mcg by mouth daily.  triamcinolone (KENALOG) 0.1 % lotion Apply  to affected area three (3) times daily. use thin layer       No current facility-administered medications on file prior to visit.

## 2018-03-19 NOTE — MR AVS SNAPSHOT
[address] [address] 
[phone] Patient: Wallace Salcido MRN: [id number] :8/15/1923 Visit Information Date & Time Provider Department Dept. Phone Encounter #  
 3/19/2018  2:30 PM Portia Meza MD [facility] [address] [id number] Follow-up Instructions Return if symptoms worsen or fail to improve. Your Appointments 2018 10:20 AM  
ESTABLISHED PATIENT with Anais Santillan MD  
[facility] Appt Note: 6 mo fu  
 [address] [facility] [place] [address]7 [address] [address] [address] Upcoming Health Maintenance Date Due  
 MEDICARE YEARLY EXAM 2017 GLAUCOMA SCREENING Q2Y 2018 DTaP/Tdap/Td series (2 - Td) 2026 Allergies as of 3/19/2018  Review Complete On: 3/19/2018 By: Queen Ozzy LPN No Known Allergies Current Immunizations  Reviewed on 2016 Name Date Influenza High Dose Vaccine PF 2017 Influenza Vaccine 10/7/2015, 10/14/2014, 2013 Influenza Vaccine (Quad) PF 2016 Influenza Vaccine Split 10/19/2011, 10/19/2005 PPD 2009 Pneumococcal Conjugate (PCV-13) 2015 TD Vaccine 2010, 5/15/2006, 10/22/2002 Tdap 2016 ZZZ-RETIRED (DO NOT USE) Pneumococcal Vaccine (Unspecified Type) 2010, 2000 Not reviewed this visit You Were Diagnosed With   
  
 Codes Comments Arm wound, left, initial encounter    -  Primary ICD-10-CM: S41.102A ICD-9-CM: 613. 0 Vitals BP Pulse Temp Resp Height(growth percentile) Weight(growth percentile) 122/59 (BP 1 Location: Right arm, BP Patient Position: Sitting) 62 97.2 °F (36.2 °C) (Oral) 16 5' 7\" (1.702 m) 126 lb (57.2 kg) SpO2 BMI Smoking Status 97% 19.73 kg/m2 Never Smoker Vitals History BMI and BSA Data Body Mass Index Body Surface Area  
 19.73 kg/m 2 1.64 m 2 Preferred Pharmacy Pharmacy Name Phone [address] [place], [place] - [address]  [phone] Your Updated Medication List  
  
   
This list is accurate as of 3/19/18  2:41 PM.  Always use your most recent med list.  
  
  
  
  
 albuterol 90 mcg/actuation inhaler Commonly known as:  PROAIR HFA Take 1 Puff by inhalation every four (4) hours as needed for Wheezing (or coughing spasms). aspirin delayed-release 81 mg tablet Take 81 mg by mouth daily. atenolol 25 mg tablet Commonly known as:  TENORMIN  
TAKE 1/2 TABLET DAILY  
  
 BOOST HIGH PROTEIN Liqd Generic drug:  food supplemt, lactose-reduced Take 237 mL by mouth three (3) times daily. colestipol 1 gram tablet Commonly known as:  COLESTID  
TAKE ONE TABLET BY MOUTH TWICE DAILY  
  
 collagenase 250 unit/gram ointment Commonly known as:  SANTYL Apply  to affected area daily. Apply to left upper arm wound. NITROSTAT 0.4 mg SL tablet Generic drug:  nitroglycerin PLACE 1 TABLET SUBLINGUAL EVERY 5 MINUTES AS NEEDED  
  
 oxybutynin 5 mg tablet Commonly known as:  XPPTSKEK Take 1 Tab by mouth two (2) times a day. PROBIOTIC PO Take  by mouth two (2) times a day. silodosin 8 mg capsule Commonly known as:  RAPAFLO Take 1 Cap by mouth nightly. simvastatin 10 mg tablet Commonly known as:  ZOCOR Take 1 Tab by mouth nightly. triamcinolone 0.1 % lotion Commonly known as:  KENALOG Apply  to affected area three (3) times daily. use thin layer VITAMIN B-12 1,000 mcg tablet Generic drug:  cyanocobalamin Take 1,000 mcg by mouth daily. VITAMIN D3 1,000 unit Cap Generic drug:  cholecalciferol Take  by mouth daily. Prescriptions Sent to Pharmacy  Refills  
 collagenase (SANTYL) 250 unit/gram ointment 0  
 Sig: Apply  to affected area daily. Apply to left upper arm wound. Class: Normal  
 Pharmacy: [facility] #: [phone] Route: Topical  
  
Follow-up Instructions Return if symptoms worsen or fail to improve. Patient Instructions Wound Care: After Your Visit Your Care Instructions Taking good care of your wound at home will help it heal quickly and reduce your chance of infection. The doctor has checked you carefully, but problems can develop later. If you notice any problems or new symptoms, get medical treatment right away. Follow-up care is a key part of your treatment and safety. Be sure to make and go to all appointments, and call your doctor if you are having problems. It's also a good idea to know your test results and keep a list of the medicines you take. How can you care for yourself at home? · Clean the area with soap and water 2 times a day unless your doctor gives you different instructions. Don't use hydrogen peroxide or alcohol, which can slow healing. ¨ You may cover the wound with a thin layer of antibiotic ointment, such as bacitracin, and a nonstick bandage. ¨ Apply more ointment and replace the bandage as needed. · Take pain medicines exactly as directed. Some pain is normal with a wound, but do not ignore pain that is getting worse instead of better. You could have an infection. ¨ If the doctor gave you a prescription medicine for pain, take it as prescribed. ¨ If you are not taking a prescription pain medicine, ask your doctor if you can take an over-the-counter medicine. · Your doctor may have closed your wound with stitches (sutures), staples, or skin glue. ¨ If you have stitches, your doctor may remove them after several days to 2 weeks. Or you may have stitches that dissolve on their own. ¨ If you have staples, your doctor may remove them after 7 to 10 days. ¨ If your wound was closed with skin glue, the glue will wear off in a few days to 2 weeks. When should you call for help? Call your doctor now or seek immediate medical care if: 
· You have signs of infection, such as: 
¨ Increased pain, swelling, warmth, or redness near the wound. ¨ Red streaks leading from the wound. ¨ Pus draining from the wound. ¨ A fever. · You bleed so much from your incision that you soak one or more bandages over 2 to 4 hours. Watch closely for changes in your health, and be sure to contact your doctor if: · The wound is not getting better each day. Where can you learn more? Go to "[employer]".be Enter L636 in the search box to learn more about \"Wound Care: After Your Visit. \"  
© 5137-3152 Healthwise, Incorporated. Care instructions adapted under license by [facility] [employer] (which disclaims liability or warranty for this information). This care instruction is for use with your licensed healthcare professional. If you have questions about a medical condition or this instruction, always ask your healthcare professional. [address] any warranty or liability for your use of this information. Content Version: 29.5.727054; Last Revised: April 23, 2012 Introducing [facility]! Dear Lilliam Perez: Thank you for requesting a NeuroSave account. Our records indicate that you have previously registered for a NeuroSave account but its currently inactive. Please call our NeuroSave support line at [phone]. Additional Information If you have questions, please visit the Frequently Asked Questions section of the NeuroSave website at [email or web address] Remember, NeuroSave is NOT to be used for urgent needs. For medical emergencies, dial 911. Now available from your iPhone and Android! Please provide this summary of care documentation to your next provider. Your primary care clinician is listed as [address]. If you have any questions after today's visit, please call [phone].

## 2018-03-19 NOTE — PROGRESS NOTES
Reviewed record in preparation for visit and have obtained necessary documentation. Patient did not bring medications to visit for review. Information provided on Advanced Directive, Living Will. Body mass index is 19.73 kg/(m^2). Health Maintenance Due   Topic Date Due    MEDICARE YEARLY EXAM  04/07/2017     1. Have you been to the ER, urgent care clinic since your last visit? Hospitalized since your last visit? no    2. Have you seen or consulted any other health care providers outside of the [address] [place] [address] since your last visit? Include any pap smears or colon screening.  no

## 2018-03-19 NOTE — PATIENT INSTRUCTIONS
Wound Care: After Your Visit  Your Care Instructions  Taking good care of your wound at home will help it heal quickly and reduce your chance of infection. The doctor has checked you carefully, but problems can develop later. If you notice any problems or new symptoms, get medical treatment right away. Follow-up care is a key part of your treatment and safety. Be sure to make and go to all appointments, and call your doctor if you are having problems. It's also a good idea to know your test results and keep a list of the medicines you take. How can you care for yourself at home? · Clean the area with soap and water 2 times a day unless your doctor gives you different instructions. Don't use hydrogen peroxide or alcohol, which can slow healing. ¨ You may cover the wound with a thin layer of antibiotic ointment, such as bacitracin, and a nonstick bandage. ¨ Apply more ointment and replace the bandage as needed. · Take pain medicines exactly as directed. Some pain is normal with a wound, but do not ignore pain that is getting worse instead of better. You could have an infection. ¨ If the doctor gave you a prescription medicine for pain, take it as prescribed. ¨ If you are not taking a prescription pain medicine, ask your doctor if you can take an over-the-counter medicine. · Your doctor may have closed your wound with stitches (sutures), staples, or skin glue. ¨ If you have stitches, your doctor may remove them after several days to 2 weeks. Or you may have stitches that dissolve on their own. ¨ If you have staples, your doctor may remove them after 7 to 10 days. ¨ If your wound was closed with skin glue, the glue will wear off in a few days to 2 weeks. When should you call for help? Call your doctor now or seek immediate medical care if:  · You have signs of infection, such as:  ¨ Increased pain, swelling, warmth, or redness near the wound. ¨ Red streaks leading from the wound.   ¨ Pus draining from the wound.  ¨ A fever. · You bleed so much from your incision that you soak one or more bandages over 2 to 4 hours. Watch closely for changes in your health, and be sure to contact your doctor if:  · The wound is not getting better each day. Where can you learn more? Go to [employer]  Enter M973 in the search box to learn more about \"Wound Care: After Your Visit. \"   © 7600-2097 [employer], [employer]. Care instructions adapted under license by [facility] (which disclaims liability or warranty for this information). This care instruction is for use with your licensed healthcare professional. If you have questions about a medical condition or this instruction, always ask your healthcare professional. [address] any warranty or liability for your use of this information. Content Version: 48.1.313342;  Last Revised: April 23, 2012

## 2018-03-23 ENCOUNTER — TELEPHONE (OUTPATIENT)
Dept: FAMILY MEDICINE CLINIC | Age: 83
End: 2018-03-23

## 2018-03-23 NOTE — TELEPHONE ENCOUNTER
Cameron Solano from [facility] called to check on the status of the prior authorization. Please advise at [phone].

## 2018-04-09 DIAGNOSIS — E78.00 PURE HYPERCHOLESTEROLEMIA: Chronic | ICD-10-CM

## 2018-04-09 DIAGNOSIS — K21.9 GASTROESOPHAGEAL REFLUX DISEASE WITHOUT ESOPHAGITIS: Chronic | ICD-10-CM

## 2018-04-09 DIAGNOSIS — D64.9 CHRONIC ANEMIA: Primary | Chronic | ICD-10-CM

## 2018-04-09 DIAGNOSIS — I25.10 CORONARY ARTERY DISEASE INVOLVING NATIVE CORONARY ARTERY OF NATIVE HEART WITHOUT ANGINA PECTORIS: Chronic | ICD-10-CM

## 2018-04-10 LAB
ALBUMIN SERPL-MCNC: 3.9 G/DL (ref 3.2–4.6)
ALBUMIN/GLOB SERPL: 1.1 {RATIO} (ref 1.2–2.2)
ALP SERPL-CCNC: 110 IU/L (ref 39–117)
ALT SERPL-CCNC: 24 IU/L (ref 0–44)
AST SERPL-CCNC: 20 IU/L (ref 0–40)
BASOPHILS # BLD AUTO: 0 X10E3/UL (ref 0–0.2)
BASOPHILS NFR BLD AUTO: 0 %
BILIRUB SERPL-MCNC: 0.4 MG/DL (ref 0–1.2)
BUN SERPL-MCNC: 25 MG/DL (ref 10–36)
BUN/CREAT SERPL: 30 (ref 10–24)
CALCIUM SERPL-MCNC: 9.3 MG/DL (ref 8.6–10.2)
CHLORIDE SERPL-SCNC: 99 MMOL/L (ref 96–106)
CHOLEST SERPL-MCNC: 139 MG/DL (ref 100–199)
CO2 SERPL-SCNC: 27 MMOL/L (ref 18–29)
CREAT SERPL-MCNC: 0.84 MG/DL (ref 0.76–1.27)
EOSINOPHIL # BLD AUTO: 0.2 X10E3/UL (ref 0–0.4)
EOSINOPHIL NFR BLD AUTO: 3 %
ERYTHROCYTE [DISTWIDTH] IN BLOOD BY AUTOMATED COUNT: 15.2 % (ref 12.3–15.4)
GFR SERPLBLD CREATININE-BSD FMLA CKD-EPI: 75 ML/MIN/1.73
GFR SERPLBLD CREATININE-BSD FMLA CKD-EPI: 87 ML/MIN/1.73
GLOBULIN SER CALC-MCNC: 3.6 G/DL (ref 1.5–4.5)
GLUCOSE SERPL-MCNC: 93 MG/DL (ref 65–99)
HCT VFR BLD AUTO: 34.9 % (ref 37.5–51)
HDLC SERPL-MCNC: 37 MG/DL
HGB BLD-MCNC: 11.1 G/DL (ref 13–17.7)
IMM GRANULOCYTES # BLD: 0 X10E3/UL (ref 0–0.1)
IMM GRANULOCYTES NFR BLD: 0 %
LDLC SERPL CALC-MCNC: 64 MG/DL (ref 0–99)
LYMPHOCYTES # BLD AUTO: 2.3 X10E3/UL (ref 0.7–3.1)
LYMPHOCYTES NFR BLD AUTO: 38 %
MCH RBC QN AUTO: 29.9 PG (ref 26.6–33)
MCHC RBC AUTO-ENTMCNC: 31.8 G/DL (ref 31.5–35.7)
MCV RBC AUTO: 94 FL (ref 79–97)
MONOCYTES # BLD AUTO: 0.6 X10E3/UL (ref 0.1–0.9)
MONOCYTES NFR BLD AUTO: 9 %
NEUTROPHILS # BLD AUTO: 3.1 X10E3/UL (ref 1.4–7)
NEUTROPHILS NFR BLD AUTO: 50 %
PLATELET # BLD AUTO: 270 X10E3/UL (ref 150–379)
POTASSIUM SERPL-SCNC: 4.3 MMOL/L (ref 3.5–5.2)
PROT SERPL-MCNC: 7.5 G/DL (ref 6–8.5)
RBC # BLD AUTO: 3.71 X10E6/UL (ref 4.14–5.8)
SODIUM SERPL-SCNC: 139 MMOL/L (ref 134–144)
TRIGL SERPL-MCNC: 189 MG/DL (ref 0–149)
VLDLC SERPL CALC-MCNC: 38 MG/DL (ref 5–40)
WBC # BLD AUTO: 6.2 X10E3/UL (ref 3.4–10.8)

## 2018-04-17 ENCOUNTER — OFFICE VISIT (OUTPATIENT)
Dept: FAMILY MEDICINE CLINIC | Age: 83
End: 2018-04-17

## 2018-04-17 VITALS
BODY MASS INDEX: 20 KG/M2 | HEART RATE: 68 BPM | OXYGEN SATURATION: 99 % | SYSTOLIC BLOOD PRESSURE: 126 MMHG | DIASTOLIC BLOOD PRESSURE: 62 MMHG | WEIGHT: 127.4 LBS | TEMPERATURE: 97.2 F | RESPIRATION RATE: 18 BRPM | HEIGHT: 67 IN

## 2018-04-17 DIAGNOSIS — Z13.31 SCREENING FOR DEPRESSION: ICD-10-CM

## 2018-04-17 DIAGNOSIS — Z00.00 MEDICARE ANNUAL WELLNESS VISIT, SUBSEQUENT: Primary | ICD-10-CM

## 2018-04-17 DIAGNOSIS — Z13.39 SCREENING FOR ALCOHOLISM: ICD-10-CM

## 2018-04-17 DIAGNOSIS — I25.10 CORONARY ARTERY DISEASE INVOLVING NATIVE CORONARY ARTERY OF NATIVE HEART WITHOUT ANGINA PECTORIS: Chronic | ICD-10-CM

## 2018-04-17 RX ORDER — SIMVASTATIN 10 MG/1
10 TABLET, FILM COATED ORAL
Qty: 90 TAB | Refills: 1 | Status: SHIPPED | OUTPATIENT
Start: 2018-04-17 | End: 2018-08-02

## 2018-04-17 NOTE — PROGRESS NOTES
Reviewed record in preparation for visit and have necessary documentation  Pt did not bring medication to office visit for review    Goals that were addressed and/or need to be completed during or after this appointment include   Health Maintenance Due   Topic Date Due    MEDICARE YEARLY EXAM  03/14/2018

## 2018-04-17 NOTE — PROGRESS NOTES
[facility]  2005 A [facility], [address] [place] [address]             Date of visit: 4/17/2018       This is a Subsequent Medicare Annual Wellness Visit (AWV), (Performed more than 12 months after effective date of Medicare Part B enrollment and 12 months after last wellness visit)    I have reviewed the patient's medical history in detail and updated the computerized patient record. Jb Matthew is a 80 y.o. male   History obtained from: the patient. Concerns today   (Patient understands that medical problems addressed today may incur additional notes andcost as this is a preventive visit)      History     Patient Active Problem List   Diagnosis Code    Pure hypercholesterolemia E78.00    Esophageal reflux K21.9    BPH (benign prostatic hyperplasia) N40.0    Gastritis K29.70    CAD (coronary artery disease) I25.10    Hiatal hernia K44.9    Anemia D64.9    Carotid artery disease ([address] I77.9    GERD (gastroesophageal reflux disease) K21.9    Chronic anemia D64.9    PAC (premature atrial contraction) I49.1     Past Medical History:   Diagnosis Date    BPH (benign prostatic hyperplasia) 5/12/2010    CAD (coronary artery disease)     Carotid artery disease ([address] 7/19/2011    Esophageal reflux 5/12/2010    Gastritis 5/12/2010    Hiatal hernia 5/12/2010    History of melanoma     IBS (irritable bowel syndrome)     Melanoma ([facility] 5/12/2010    Pure hypercholesterolemia 5/12/2010    S/P CABG (coronary artery bypass graft)     Tooth fracture 2/14      Past Surgical History:   Procedure Laterality Date    ABDOMEN SURGERY PROC UNLISTED  1990    hernia, right inguinal    DUPLEX CAROTID BILATERAL  7/2011    Mild bilateral disease    ECHO 2D ADULT  2010    normal LV wall motion and ejection fraction, left atrial enlargement, mild aortic regurgitation, mild to moderate mitral regurgitation and mild tricuspid regurgitation. The ejection fraction was 55-60%.  HX CORONARY ARTERY BYPASS GRAFT  1995    LIMA to LAD, SVG to diagonal, SVG to trifurcation vessel and SVG to obtuse marginal.     HX HEART CATHETERIZATION  1995     Left ventricular wall motion is mild hypokinesis of the anterior wall. Ejection Fraction is estimated at 55%. The Coronary Angiography revealed:. LAD 80% stenosis. OM1 80% stenosis. RCA >90% stenosis.  HX HEENT      melanoma surgery x2    HX OTHER SURGICAL      Treadmill stress test 7/26/12 - walked 6:43, no chest pain, (8.0 METS); stopped for SOB; no ischemic EKG changes, frequent PVC's including in couplets. Also one brief run of NSVT (7 beats) during stage 2.     HX OTHER SURGICAL      Carotid dopplers 7/12  show 10-49% stenosis bilat.  HX OTHER SURGICAL      Exercise cardiolite 8/10/12 - walked 7 min without chest pain; EKG with anteroseptal infarct age undetermined; no ischemic EKG changes; short runs (7 beat) of NSVT during exercise. Normal myocardial perfusion and function. LVEF 69%     HX OTHER SURGICAL      Carotid duplex 6/20/14 - mild 10-49% stenosis bilat     STRESS TEST - GXT  7/2011    WNL     No Known Allergies  Current Outpatient Prescriptions   Medication Sig Dispense Refill    silodosin (RAPAFLO) 8 mg capsule Take 1 Cap by mouth nightly. 90 Cap 2    atenolol (TENORMIN) 25 mg tablet TAKE 1/2 TABLET DAILY 45 Tab 3    simvastatin (ZOCOR) 10 mg tablet Take 1 Tab by mouth nightly. 90 Tab 1    colestipol (COLESTID) 1 gram tablet TAKE ONE TABLET BY MOUTH TWICE DAILY 60 Tab 11    oxybutynin (DITROPAN) 5 mg tablet Take 1 Tab by mouth two (2) times a day. 60 Tab 5    food supplemt, lactose-reduced (BOOST HIGH PROTEIN) liqd Take 237 mL by mouth three (3) times daily.  albuterol (PROAIR HFA) 90 mcg/actuation inhaler Take 1 Puff by inhalation every four (4) hours as needed for Wheezing (or coughing spasms).  1 Inhaler 2    NITROSTAT 0.4 mg SL tablet PLACE 1 TABLET SUBLINGUAL EVERY 5 MINUTES AS NEEDED 25 Tab 1    LACTOBACILLUS RHAMNOSUS GG (PROBIOTIC PO) Take  by mouth two (2) times a day.  Cholecalciferol, Vitamin D3, (VITAMIN D) 1,000 unit Cap Take  by mouth daily.  aspirin delayed-release 81 mg tablet Take 81 mg by mouth daily.  cyanocobalamin (VITAMIN B-12) 1,000 mcg tablet Take 1,000 mcg by mouth daily.  collagenase (SANTYL) 250 unit/gram ointment Apply  to affected area daily. Apply to left upper arm wound. 15 g 0     Family History   Problem Relation Age of Onset    Heart Disease Mother     Heart Disease Father      Social History   Substance Use Topics    Smoking status: Never Smoker    Smokeless tobacco: Never Used    Alcohol use 0.0 oz/week     0 Standard drinks or equivalent per week      Comment: Occasionally       Specialists/Care Team   Angy Welch has established care with the following healthcare providers:  Patient Care Team:  Rand Galvez MD as PCP - General  Valeria Mckee MD as Physician (Dermatology)  Emir Yanez MD as Physician (Gastroenterology)  Frederic Walton MD (Cardiology)  Char Tuttle MD (Urology)      Health Risk Assessment     Demographics   male  80 y.o. General Health Questions   -During the past 4 weeks:   -how would you rate your health in general? Fair   -how often have you been bothered by feeling dizzy when standing up? never   -how much have you been bothered by bodily pain? not   -Have you noticed any hearing difficulties? yes   -has your physical and emotional health limited your social activities with family or friends? yes    Emotional Health Questions   -Do you have a history of depression, anxiety, or emotional problems? no  -Over the past 2 weeks, have you felt down, depressed or hopeless? no  -Over the past 2 weeks, have you felt little interest or pleasure in doing things? no    Health Habits   Please describe your diet habits: good  Do you get 5 servings of fruits or vegetables daily?  no  Do you exercise regularly? No, weak and if he falls he cannot get up. Alcohol Risk Factor Screening: On any occasion during the past 3 months, have you had more than 4 drinks containing alcohol? No   Do you average more than 14 drinks per week? No     Activities of Daily Living and Functional Status   -Do you need help with eating, walking, dressing, bathing, toileting, the phone, transportation, shopping, preparing meals, housework, laundry, medications or managing money? no  -In the past four weeks, was someone available to help you if you needed and wanted help with anything? no  -Are you confident are you that you can control and manage most of your health problems? yes  -Have you been given information to help you keep track of your medications? yes  -How often do you have trouble taking your medications as prescribed? never    Fall Risk and Home Safety   Have you fallen 2 or more times in the past year? no  Does your home have rugs in the hallway, lack grab bars in the bathroom, lack handrails on the stairs or have poor lighting? no  Do you have smoke detectors and check them regularly? yes  Do you have difficulties driving a car? no  Do you always fasten your seat belt when you are in a car? yes    Review of Systems (if indicated for problems addressed today)   Review of Systems   Constitutional: Positive for malaise/fatigue. Negative for chills, fever and weight loss. HENT: Negative. Negative for hearing loss. Eyes: Negative. Negative for blurred vision and double vision. Respiratory: Negative. Negative for cough, hemoptysis, sputum production and shortness of breath. Cardiovascular: Negative. Negative for chest pain, palpitations and orthopnea. Gastrointestinal: Negative. Negative for abdominal pain, blood in stool, heartburn, nausea and vomiting. Genitourinary: Negative. Negative for dysuria, frequency and urgency. Musculoskeletal: Negative. Negative for back pain, myalgias and neck pain. Skin: Negative. Negative for rash. Neurological: Positive for weakness. Negative for dizziness, tingling, tremors and headaches. Endo/Heme/Allergies: Negative. Psychiatric/Behavioral: Negative. Negative for depression. Physical Examination     Wt Readings from Last 3 Encounters:   04/17/18 127 lb 6.4 oz (57.8 kg)   03/19/18 126 lb (57.2 kg)   02/08/18 129 lb (58.5 kg)     Temp Readings from Last 3 Encounters:   04/17/18 97.2 °F (36.2 °C) (Oral)   03/19/18 97.2 °F (36.2 °C) (Oral)   02/08/18 98.2 °F (36.8 °C) (Oral)     BP Readings from Last 3 Encounters:   04/17/18 126/62   03/19/18 122/59   02/08/18 127/55     Pulse Readings from Last 3 Encounters:   04/17/18 68   03/19/18 62   02/08/18 81       Body mass index is 19.95 kg/(m^2). No exam data present  Was the patient's timed Up & Go test unsteady or longer than 10 seconds? no    Evaluation of Cognitive Function   Mood/affect:  neutral  Orientation: Person, Place, Time and Situation  Appearance: age appropriate and casually dressed  Family member/caregiver input: no    Additional exam if indicated for problems addressed today:  Physical Exam   Constitutional: He is oriented to person, place, and time. He appears well-developed and well-nourished. No distress. HENT:   Head: Normocephalic and atraumatic. Mouth/Throat: Oropharynx is clear and moist.   Eyes: Conjunctivae are normal. Right eye exhibits no discharge. Left eye exhibits no discharge. No scleral icterus. Neck: No thyromegaly present. No carotid bruit. Cardiovascular: Normal rate, regular rhythm and normal heart sounds. Exam reveals no gallop and no friction rub. No murmur heard. Pulmonary/Chest: Effort normal and breath sounds normal. No respiratory distress. He has no wheezes. He has no rales. He exhibits no tenderness. Abdominal: Soft. Bowel sounds are normal. He exhibits no distension and no mass. There is no tenderness. There is no guarding.    Musculoskeletal: He exhibits no edema or tenderness. Walks with a cane. Lymphadenopathy:     He has no cervical adenopathy. Neurological: He is alert and oriented to person, place, and time. No cranial nerve deficit. Skin: Skin is warm and dry. No rash noted. He is not diaphoretic. No erythema. No pallor. Psychiatric: He has a normal mood and affect. His behavior is normal.         Advice/Referrals/Counseling (as indicated)   Education and counseling provided for any problems identified above:     Preventive Services     (Preventive care checklist to be included in patient instructions)  Discussed today Done Previously Not Needed     x  Pneumococcal vaccines    x  Flu vaccine      Hepatitis B vaccine (if at risk)   declined   Shingles vaccine    x  TDAP vaccine     x Digital rectal exam     x PSA     x Colorectal cancer screening     x Low-dose CT for lung cancer screening     x Bone density test    X-today  Glaucoma screening    x  Cholesterol test    x  Diabetes screening test      x Diabetes self-management class     x Nutritionist referral for diabetes or renal disease     Discussion of Advance Directive   Discussed with Emely Preciado. Rebekah his ability to prepare and advance directive in the case that an injury or illness causes him to be unable to make health care decisions. Assessment/Plan   Z00.00    ICD-10-CM ICD-9-CM    1. Medicare annual wellness visit, subsequent Z00.00 V70.0    2. Screening for depression Z13.89 V79.0    3. Screening for alcoholism Z13.89 V79.1        No orders of the defined types were placed in this encounter. reviewe previous labs.       Patient Care Team:  Carmelo Kwon MD as PCP - General  Natalie Patrick MD as Physician (Dermatology)  Romain Davalos MD as Physician (Gastroenterology)  Willma Soulier, MD (Cardiology)  Lidya Canada MD (Urology)        Future Appointments  Date Time Provider Taisha Aguero   8/2/2018 10:20 AM Betsy Deleon MD [address] [place] [address] Duncan Tripathi MD

## 2018-04-17 NOTE — MR AVS SNAPSHOT
[address] [id number] 
[phone] Patient: Cherri Lazo MRN: [id number] :8/15/1923 Visit Information Date & Time Provider Department Dept. Phone Encounter #  
 2018 11:30 AM Fermin Holland MD [address] [place] [address] [phone] [id number] Follow-up Instructions Return in about 3 months (around 2018). Your Appointments 2018 10:20 AM  
ESTABLISHED PATIENT with Jeet Sutherland MD  
[facility] ([facility]) Appt Note: 6 mo fu  
 700 [facility] 600 1007 [address] [address] [address] Upcoming Health Maintenance Date Due  
 MEDICARE YEARLY EXAM 3/14/2018 GLAUCOMA SCREENING Q2Y 2018 DTaP/Tdap/Td series (2 - Td) 2026 Allergies as of 2018  Review Complete On: 2018 By: Fermin Holland MD  
 No Known Allergies Current Immunizations  Reviewed on 2016 Name Date Influenza High Dose Vaccine PF 2017 Influenza Vaccine 10/7/2015, 10/14/2014, 2013 Influenza Vaccine (Quad) PF 2016 Influenza Vaccine Split 10/19/2011, 10/19/2005 PPD 2009 Pneumococcal Conjugate (PCV-13) 2015 TD Vaccine 2010, 5/15/2006, 10/22/2002 Tdap 2016 ZZZ-RETIRED (DO NOT USE) Pneumococcal Vaccine (Unspecified Type) 2010, 2000 Not reviewed this visit You Were Diagnosed With   
  
 Codes Comments Medicare annual wellness visit, subsequent    -  Primary ICD-10-CM: Z00.00 ICD-9-CM: V70.0 Screening for depression     ICD-10-CM: Z13.89 ICD-9-CM: V79.0 Screening for alcoholism     ICD-10-CM: Z13.89 ICD-9-CM: V79.1 Coronary artery disease involving native coronary artery of native heart without angina pectoris     ICD-10-CM: I25.10 ICD-9-CM: 414.01   
  
 Vitals BP Pulse Temp Resp Height(growth percentile) Weight(growth percentile) 126/62 (BP 1 Location: Left arm, BP Patient Position: Sitting) 68 97.2 °F (36.2 °C) (Oral) 18 5' 7\" (1.702 m) 127 lb 6.4 oz (57.8 kg) SpO2 BMI Smoking Status 99% 19.95 kg/m2 Never Smoker Vitals History BMI and BSA Data Body Mass Index Body Surface Area  
 19.95 kg/m 2 1.65 m 2 Preferred Pharmacy Pharmacy Name Phone  [facility] [phone] Your Updated Medication List  
  
   
This list is accurate as of 4/17/18 11:49 AM.  Always use your most recent med list.  
  
  
  
  
 albuterol 90 mcg/actuation inhaler Commonly known as:  PROAIR HFA Take 1 Puff by inhalation every four (4) hours as needed for Wheezing (or coughing spasms). aspirin delayed-release 81 mg tablet Take 81 mg by mouth daily. atenolol 25 mg tablet Commonly known as:  TENORMIN  
TAKE 1/2 TABLET DAILY  
  
 BOOST HIGH PROTEIN Liqd Generic drug:  food supplemt, lactose-reduced Take 237 mL by mouth three (3) times daily. colestipol 1 gram tablet Commonly known as:  COLESTID  
TAKE ONE TABLET BY MOUTH TWICE DAILY  
  
 collagenase 250 unit/gram ointment Commonly known as:  SANTYL Apply  to affected area daily. Apply to left upper arm wound. NITROSTAT 0.4 mg SL tablet Generic drug:  nitroglycerin PLACE 1 TABLET SUBLINGUAL EVERY 5 MINUTES AS NEEDED  
  
 oxybutynin 5 mg tablet Commonly known as:  UFGDAWZC Take 1 Tab by mouth two (2) times a day. PROBIOTIC PO Take  by mouth two (2) times a day. silodosin 8 mg capsule Commonly known as:  RAPAFLO Take 1 Cap by mouth nightly. simvastatin 10 mg tablet Commonly known as:  ZOCOR Take 1 Tab by mouth nightly. VITAMIN B-12 1,000 mcg tablet Generic drug:  cyanocobalamin Take 1,000 mcg by mouth daily. VITAMIN D3 1,000 unit Cap Generic drug:  cholecalciferol Take  by mouth daily. Prescriptions Sent to Pharmacy Refills  
 simvastatin (ZOCOR) 10 mg tablet 1 Sig: Take 1 Tab by mouth nightly. Class: Normal  
 Pharmacy:  [address] Ph #: [phone] Route: Oral  
  
Follow-up Instructions Return in about 3 months (around 7/17/2018). Patient Instructions Well Visit, Over 72: Care Instructions Your Care Instructions Physical exams can help you stay healthy. Your doctor has checked your overall health and may have suggested ways to take good care of yourself. He or she also may have recommended tests. At home, you can help prevent illness with healthy eating, regular exercise, and other steps. Follow-up care is a key part of your treatment and safety. Be sure to make and go to all appointments, and call your doctor if you are having problems. It's also a good idea to know your test results and keep a list of the medicines you take. How can you care for yourself at home? · Reach and stay at a healthy weight. This will lower your risk for many problems, such as obesity, diabetes, heart disease, and high blood pressure. · Get at least 30 minutes of exercise on most days of the week. Walking is a good choice. You also may want to do other activities, such as running, swimming, cycling, or playing tennis or team sports. · Do not smoke. Smoking can make health problems worse. If you need help quitting, talk to your doctor about stop-smoking programs and medicines. These can increase your chances of quitting for good. · Protect your skin from too much sun. When you're outdoors from 10 a.m. to 4 p.m., stay in the shade or cover up with clothing and a hat with a wide brim. Wear sunglasses that block UV rays. Even when it's cloudy, put broad-spectrum sunscreen (SPF 30 or higher) on any exposed skin. · See a dentist one or two times a year for checkups and to have your teeth cleaned. · Wear a seat belt in the car. · Limit alcohol to 2 drinks a day for men and 1 drink a day for women. Too much alcohol can cause health problems. Follow your doctor's advice about when to have certain tests. These tests can spot problems early. For men and women · Cholesterol. Your doctor will tell you how often to have this done based on your overall health and other things that can increase your risk for heart attack and stroke. · Blood pressure. Have your blood pressure checked during a routine doctor visit. Your doctor will tell you how often to check your blood pressure based on your age, your blood pressure results, and other factors. · Diabetes. Ask your doctor whether you should have tests for diabetes. · Vision. Experts recommend that you have yearly exams for glaucoma and other age-related eye problems. · Hearing. Tell your doctor if you notice any change in your hearing. You can have tests to find out how well you hear. · Colon cancer tests. Keep having colon cancer tests as your doctor recommends. You can have one of several types of tests. · Heart attack and stroke risk. At least every 4 to 6 years, you should have your risk for heart attack and stroke assessed. Your doctor uses factors such as your age, blood pressure, cholesterol, and whether you smoke or have diabetes to show what your risk for a heart attack or stroke is over the next 10 years. · Osteoporosis. Talk to your doctor about whether you should have a bone density test to find out whether you have thinning bones. Also ask your doctor about whether you should take calcium and vitamin D supplements. For women · Pap test and pelvic exam. You may no longer need a Pap test. Talk with your doctor about whether to stop or continue to have Pap tests.  
· Breast exam and mammogram. Ask how often you should have a mammogram, which is an X-ray of your breasts. A mammogram can spot breast cancer before it can be felt and when it is easiest to treat. · Thyroid disease. Talk to your doctor about whether to have your thyroid checked as part of a regular physical exam. Women have an increased chance of a thyroid problem. For men · Prostate exam. Talk to your doctor about whether you should have a blood test (called a PSA test) for prostate cancer. Experts disagree on whether men should have this test. Some experts recommend that you discuss the benefits and risks of the test with your doctor. · Abdominal aortic aneurysm. Ask your doctor whether you should have a test to check for an aneurysm. You may need a test if you ever smoked or if your parent, brother, sister, or child has had an aneurysm. When should you call for help? Watch closely for changes in your health, and be sure to contact your doctor if you have any problems or symptoms that concern you. Where can you learn more? Go to http://vipin-josé miguel.info/. Enter O695 in the search box to learn more about \"Well Visit, Over 65: Care Instructions. \" Current as of: May 12, 2017 Content Version: 11.4 © 6667-4338 Streetline. Care instructions adapted under license by SIPphone (which disclaims liability or warranty for this information). If you have questions about a medical condition or this instruction, always ask your healthcare professional. [address] any warranty or liability for your use of this information. Introducing [facility] & HEALTH SERVICES! Mireille Jacques introduces uniRow patient portal. Now you can access parts of your medical record, email your doctor's office, and request medication refills online. 1. In your internet browser, go to [email or web address] 2. Click on the First Time User? Click Here link in the Sign In box. You will see the New Member Sign Up page. 3. Enter your Qwbcg Access Code exactly as it appears below. You will not need to use this code after youve completed the sign-up process. If you do not sign up before the expiration date, you must request a new code. · Qwbcg Access Code: [email or web address] Expires: 6/18/2018  1:55 PM 
 
4. Enter the last four digits of your Social Security Number (xxxx) and Date of Birth (mm/dd/yyyy) as indicated and click Submit. You will be taken to the next sign-up page. 5. Create a Eyeviewt ID. This will be your Qwbcg login ID and cannot be changed, so think of one that is secure and easy to remember. 6. Create a Qwbcg password. You can change your password at any time. 7. Enter your Password Reset Question and Answer. This can be used at a later time if you forget your password. 8. Enter your e-mail address. You will receive e-mail notification when new information is available in [address] 9. Click Sign Up. You can now view and download portions of your medical record. 10. Click the Download Summary menu link to download a portable copy of your medical information. If you have questions, please visit the Frequently Asked Questions section of the Qwbcg website. Remember, Qwbcg is NOT to be used for urgent needs. For medical emergencies, dial 911. Now available from your iPhone and Android! Please provide this summary of care documentation to your next provider. Your primary care clinician is listed as [address]. If you have any questions after today's visit, please call [phone].

## 2018-04-17 NOTE — PATIENT INSTRUCTIONS

## 2018-05-03 RX ORDER — OXYBUTYNIN CHLORIDE 5 MG/1
5 TABLET ORAL 2 TIMES DAILY
Qty: 60 TAB | Refills: 5 | Status: SHIPPED | OUTPATIENT
Start: 2018-05-03 | End: 2018-12-18 | Stop reason: SDUPTHER

## 2018-07-24 ENCOUNTER — OFFICE VISIT (OUTPATIENT)
Dept: FAMILY MEDICINE CLINIC | Age: 83
End: 2018-07-24

## 2018-07-24 VITALS
HEART RATE: 84 BPM | SYSTOLIC BLOOD PRESSURE: 125 MMHG | HEIGHT: 67 IN | TEMPERATURE: 97.8 F | DIASTOLIC BLOOD PRESSURE: 53 MMHG | OXYGEN SATURATION: 100 % | WEIGHT: 126 LBS | RESPIRATION RATE: 24 BRPM | BODY MASS INDEX: 19.78 KG/M2

## 2018-07-24 DIAGNOSIS — E78.00 PURE HYPERCHOLESTEROLEMIA: Primary | Chronic | ICD-10-CM

## 2018-07-24 DIAGNOSIS — I77.9 BILATERAL CAROTID ARTERY DISEASE (HCC): ICD-10-CM

## 2018-07-24 DIAGNOSIS — I25.10 CORONARY ARTERY DISEASE INVOLVING NATIVE CORONARY ARTERY OF NATIVE HEART WITHOUT ANGINA PECTORIS: Chronic | ICD-10-CM

## 2018-07-24 DIAGNOSIS — L84 CORN OF TOE: ICD-10-CM

## 2018-07-24 DIAGNOSIS — N40.1 BENIGN PROSTATIC HYPERPLASIA WITH LOWER URINARY TRACT SYMPTOMS, SYMPTOM DETAILS UNSPECIFIED: Chronic | ICD-10-CM

## 2018-07-24 DIAGNOSIS — K21.9 GASTROESOPHAGEAL REFLUX DISEASE WITHOUT ESOPHAGITIS: Chronic | ICD-10-CM

## 2018-07-24 NOTE — MR AVS SNAPSHOT
[address] [address] 
[phone] Patient: Cassi Whitehead MRN: [id number] :8/15/1923 Visit Information Date & Time Provider Department Dept. Phone Encounter #  
 2018 10:30 AM Izola Rubinstein, MD [address] [place], [place] [id number] Follow-up Instructions Return in about 6 months (around 2019) for fasting lab appt. Neha Sierra Your Appointments 2018 10:20 AM  
ESTABLISHED PATIENT with Christa Irene MD  
[facility] ([facility]) Appt Note: 6 mo fu  
 [address] [facility] 600 1007 [facility]  
54 [facility] [address] [address] Upcoming Health Maintenance Date Due Influenza Age 5 to Adult 2018 GLAUCOMA SCREENING Q2Y 2018 MEDICARE YEARLY EXAM 2019 DTaP/Tdap/Td series (2 - Td) 2026 Allergies as of 2018  Review Complete On: 2018 By: Mark Trejo LPN No Known Allergies Current Immunizations  Reviewed on 2016 Name Date Influenza High Dose Vaccine PF 2017 Influenza Vaccine 10/7/2015, 10/14/2014, 2013 Influenza Vaccine (Quad) PF 2016 Influenza Vaccine Split 10/19/2011, 10/19/2005 PPD 2009 Pneumococcal Conjugate (PCV-13) 2015 TD Vaccine 2010, 5/15/2006, 10/22/2002 Tdap 2016 ZZZ-RETIRED (DO NOT USE) Pneumococcal Vaccine (Unspecified Type) 2010, 2000 Not reviewed this visit You Were Diagnosed With   
  
 Codes Comments Pure hypercholesterolemia    -  Primary ICD-10-CM: E78.00 ICD-9-CM: 272.0 Gastroesophageal reflux disease without esophagitis     ICD-10-CM: K21.9 ICD-9-CM: 530.81 Benign prostatic hyperplasia with lower urinary tract symptoms, symptom details unspecified     ICD-10-CM: N40.1 ICD-9-CM: 600.01   
 Coronary artery disease involving native coronary artery of native heart without angina pectoris     ICD-10-CM: I25.10 ICD-9-CM: 414.01 Corn of toe     ICD-10-CM: L84 
ICD-9-CM: 854 Vitals BP Pulse Temp Resp Height(growth percentile) Weight(growth percentile) 141/60 (BP 1 Location: Left arm, BP Patient Position: Sitting) 78 97.8 °F (36.6 °C) (Oral) 24 5' 7\" (1.702 m) 126 lb (57.2 kg) SpO2 BMI Smoking Status 100% 19.73 kg/m2 Never Smoker Vitals History BMI and BSA Data Body Mass Index Body Surface Area  
 19.73 kg/m 2 1.64 m 2 Preferred Pharmacy Pharmacy Name Phone [address] [place], [place] - [address] -[phone] Your Updated Medication List  
  
   
This list is accurate as of 7/24/18 10:55 AM.  Always use your most recent med list.  
  
  
  
  
 albuterol 90 mcg/actuation inhaler Commonly known as:  PROAIR HFA Take 1 Puff by inhalation every four (4) hours as needed for Wheezing (or coughing spasms). aspirin delayed-release 81 mg tablet Take 81 mg by mouth daily. atenolol 25 mg tablet Commonly known as:  TENORMIN  
TAKE 1/2 TABLET DAILY  
  
 BOOST HIGH PROTEIN Liqd Generic drug:  food supplemt, lactose-reduced Take 237 mL by mouth three (3) times daily. colestipol 1 gram tablet Commonly known as:  COLESTID  
TAKE ONE TABLET BY MOUTH TWICE DAILY  
  
 collagenase 250 unit/gram ointment Commonly known as:  SANTYL Apply  to affected area daily. Apply to left upper arm wound. NITROSTAT 0.4 mg SL tablet Generic drug:  nitroglycerin PLACE 1 TABLET BY MOUTH UNDER TONGUE EVERY 5 MINUTES AS NEEDED  
  
 oxybutynin 5 mg tablet Commonly known as:  FLEPBBOT Take 1 Tab by mouth two (2) times a day. PROBIOTIC PO Take  by mouth two (2) times a day. silodosin 8 mg capsule Commonly known as:  RAPAFLO Take 1 Cap by mouth nightly. simvastatin 10 mg tablet Commonly known as:  ZOCOR Take 1 Tab by mouth nightly. VITAMIN B-12 1,000 mcg tablet Generic drug:  cyanocobalamin Take 1,000 mcg by mouth daily. VITAMIN D3 1,000 unit Cap Generic drug:  cholecalciferol Take  by mouth daily. We Performed the Following HEMOGLOBIN O0335503 CPT(R)] LIPID PANEL [45783 CPT(R)] METABOLIC PANEL, COMPREHENSIVE [56394 CPT(R)] Follow-up Instructions Return in about 6 months (around 1/24/2019) for fasting lab appt. Katja Carrizales Patient Instructions Corns and Calluses: Care Instructions Your Care Instructions Corns and calluses are areas of thick, hard, dead skin. They form to protect your skin from injury. Corns usually form where toes rub together. Calluses often form on the hands or feet. They may form wherever the skin rubs against something, such as shoes. In most cases, you can take steps at home to care for a corn or callus. Follow-up care is a key part of your treatment and safety. Be sure to make and go to all appointments, and call your doctor if you are having problems. It's also a good idea to know your test results and keep a list of the medicines you take. How can you care for yourself at home? · Wear shoes and other footwear that fit correctly and are roomy. This will reduce rubbing and give corns or calluses time to heal. 
· Use protective pads, such as moleskin, to cushion the callus or corn. · Soften the corn or callus and remove the dead skin by using over-the-counter products such as salicylic acid. If you have a condition that causes problems with blood flow (such as peripheral vascular disease) or loss of feeling in your feet (such as diabetes), talk to your doctor before you try any home treatment. · Soak your corn or callus in warm water, and then use a pumice stone to rub dead skin away.  
· Wash your feet regularly, and rub lotion into your feet while they are still moist. Dry skin can cause a callus to crack and bleed. · Never cut the corn or callus yourself, especially if you have problems with blood flow to your legs or feet. When should you call for help? Call your doctor now or seek immediate medical care if: 
  · You have signs of infection, such as: 
¨ Increased pain, swelling, warmth, or redness around the corn or callus. ¨ Red streaks leading from the corn or callus. ¨ Pus draining from the corn or callus. ¨ A fever.  
 Watch closely for changes in your health, and be sure to contact your doctor if: 
  · You do not get better as expected. Where can you learn more? Go to http://vipin-josé miguel.info/. Enter R244 in the search box to learn more about \"Corns and Calluses: Care Instructions. \" Current as of: October 5, 2017 Content Version: 11.7 © 7077-7156 Fashion.me. Care instructions adapted under license by [employer] (which disclaims liability or warranty for this information). If you have questions about a medical condition or this instruction, always ask your healthcare professional. [address] [address] any warranty or liability for your use of this information. Introducing [facility] & HEALTH SERVICES! [employer] introduces iAcademic patient portal. Now you can access parts of your medical record, email your doctor's office, and request medication refills online. 1. In your internet browser, go to [email or web address] Technologies 2. Click on the First Time User? Click Here link in the Sign In box. You will see the New Member Sign Up page. 3. Enter your iAcademic Access Code exactly as it appears below. You will not need to use this code after youve completed the sign-up process. If you do not sign up before the expiration date, you must request a new code. · iAcademic Access Code: [email or web address] Expires: 10/22/2018 10:31 AM 
 
 4. Enter the last four digits of your Social Security Number (xxxx) and Date of Birth (mm/dd/yyyy) as indicated and click Submit. You will be taken to the next sign-up page. 5. Create a HowGood ID. This will be your HowGood login ID and cannot be changed, so think of one that is secure and easy to remember. 6. Create a HowGood password. You can change your password at any time. 7. Enter your Password Reset Question and Answer. This can be used at a later time if you forget your password. 8. Enter your e-mail address. You will receive e-mail notification when new information is available in [address] 9. Click Sign Up. You can now view and download portions of your medical record. 10. Click the Download Summary menu link to download a portable copy of your medical information. If you have questions, please visit the Frequently Asked Questions section of the HowGood website. Remember, HowGood is NOT to be used for urgent needs. For medical emergencies, dial 911. Now available from your iPhone and Android! Please provide this summary of care documentation to your next provider. Your primary care clinician is listed as [address]. If you have any questions after today's visit, please call [phone].

## 2018-07-24 NOTE — ASSESSMENT & PLAN NOTE
Stable, based on history, physical exam and review of pertinent labs, studies and medications; meds reconciled; continue current treatment plan. Key Peripheral Vascular Disease Meds             simvastatin (ZOCOR) 10 mg tablet  (Taking) Take 1 Tab by mouth nightly. colestipol (COLESTID) 1 gram tablet  (Taking) TAKE ONE TABLET BY MOUTH TWICE DAILY    aspirin delayed-release 81 mg tablet  (Taking) Take 81 mg by mouth daily.         Lab Results   Component Value Date/Time    WBC 6.2 04/09/2018 08:37 AM    HGB 11.1 04/09/2018 08:37 AM    HCT 34.9 04/09/2018 08:37 AM    PLATELET 685 13/74/9536 08:37 AM    Creatinine 0.84 04/09/2018 08:37 AM    BUN 25 04/09/2018 08:37 AM    Cholesterol, total 139 04/09/2018 08:37 AM    HDL Cholesterol 37 04/09/2018 08:37 AM    LDL, calculated 64 04/09/2018 08:37 AM    Triglyceride 189 04/09/2018 08:37 AM

## 2018-07-24 NOTE — PROGRESS NOTES
1. Have you been to the ER, urgent care clinic since your last visit? Hospitalized since your last visit? No    2. Have you seen or consulted any other health care providers outside of the [facility] since your last visit? Include any pap smears or colon screening. No  Reviewed record in preparation for visit and have necessary documentation  Pt did not bring medication to office visit for review  Goals that were addressed and/or need to be completed during or after this appointment include     There are no preventive care reminders to display for this patient.

## 2018-07-24 NOTE — PATIENT INSTRUCTIONS
Corns and Calluses: Care Instructions  Your Care Instructions  Corns and calluses are areas of thick, hard, dead skin. They form to protect your skin from injury. Corns usually form where toes rub together. Calluses often form on the hands or feet. They may form wherever the skin rubs against something, such as shoes. In most cases, you can take steps at home to care for a corn or callus. Follow-up care is a key part of your treatment and safety. Be sure to make and go to all appointments, and call your doctor if you are having problems. It's also a good idea to know your test results and keep a list of the medicines you take. How can you care for yourself at home? · Wear shoes and other footwear that fit correctly and are roomy. This will reduce rubbing and give corns or calluses time to heal.  · Use protective pads, such as moleskin, to cushion the callus or corn. · Soften the corn or callus and remove the dead skin by using over-the-counter products such as salicylic acid. If you have a condition that causes problems with blood flow (such as peripheral vascular disease) or loss of feeling in your feet (such as diabetes), talk to your doctor before you try any home treatment. · Soak your corn or callus in warm water, and then use a pumice stone to rub dead skin away. · Wash your feet regularly, and rub lotion into your feet while they are still moist. Dry skin can cause a callus to crack and bleed. · Never cut the corn or callus yourself, especially if you have problems with blood flow to your legs or feet. When should you call for help? Call your doctor now or seek immediate medical care if:    · You have signs of infection, such as:  ¨ Increased pain, swelling, warmth, or redness around the corn or callus. ¨ Red streaks leading from the corn or callus. ¨ Pus draining from the corn or callus.   ¨ A fever.    Watch closely for changes in your health, and be sure to contact your doctor if:    · You do not get better as expected. Where can you learn more? Go to http://vipin-josé miguel.info/. Enter R244 in the search box to learn more about \"Corns and Calluses: Care Instructions. \"  Current as of: October 5, 2017  Content Version: 11.7  © 0000-1238 [employer], [employer]. Care instructions adapted under license by icomasoft (which disclaims liability or warranty for this information). If you have questions about a medical condition or this instruction, always ask your healthcare professional. [address] any warranty or liability for your use of this information.

## 2018-07-24 NOTE — PROGRESS NOTES
Progress Note    Patient: Tiffanie Orellana MRN: [id number]  SSN: [id number]    YOB: 1923  Age: 80 y.o. Sex: male        Chief Complaint   Patient presents with    Anemia     3 month follow up     Cholesterol Problem         Subjective:     Encounter Diagnoses   Name Primary?  Pure hypercholesterolemia:  Cardiovascular risks for him are: LDL goal is under 80  existing CAD  hyperlipidemia. Key Antihyperlipidemia Meds             simvastatin (ZOCOR) 10 mg tablet  (Taking) Take 1 Tab by mouth nightly. colestipol (COLESTID) 1 gram tablet  (Taking) TAKE ONE TABLET BY MOUTH TWICE DAILY        Lab Results   Component Value Date/Time    Cholesterol, total 139 04/09/2018 08:37 AM    HDL Cholesterol 37 (L) 04/09/2018 08:37 AM    LDL, calculated 64 04/09/2018 08:37 AM    Triglyceride 189 (H) 04/09/2018 08:37 AM    CHOL/HDL Ratio 3.7 09/14/2010 08:42 AM     Lab Results   Component Value Date/Time    ALT (SGPT) 24 04/09/2018 08:37 AM    AST (SGOT) 20 04/09/2018 08:37 AM    Alk. phosphatase 110 04/09/2018 08:37 AM    Bilirubin, total 0.4 04/09/2018 08:37 AM      Myalgias: No   Fatigue: No   Other side effects: no  Wt Readings from Last 3 Encounters:   07/24/18 126 lb (57.2 kg)   04/17/18 127 lb 6.4 oz (57.8 kg)   03/19/18 126 lb (57.2 kg)     The patient is aware of our goal to reduce or eliminate the long term problems (such as strokes and heart attacks) related to poorly controlled hyperlipidemia. Yes    Gastroesophageal reflux disease without esophagitis:  Current control of Symptoms:good  Hiatal Hernia:no  Current Medications: No medications now  The patient has no history melena or bright red blood in the stools. The patient avoids high dose aspirin and NSAID therapy. The patient is aware of diet changes needed, elevating the head of the bed and appropriate use of antacids.             Benign prostatic hyperplasia with lower urinary tract symptoms, symptom details unspecified: He is not complaining about his urinary patterns today.  Coronary artery disease involving native coronary artery of native heart without angina pectoris: He has a remarkable man. He is above four-vessel CABG in 1995. He is 2 weeks away from turning 89. No chest pain, MATA or SOB. No PND or orthopnea.  Corn of toe: He says his right fifth toe was hurting and on exam the only finding is a soft corn on the lateral aspect of his fifth toe I explained to him several times that he needs to keep the pressure off this toe by either cutting out the toe over an old shoe to wear at home or using a corn pad with a donut shape. Current and past medical information:    Current Medications after this visit[de-identified]   Current Outpatient Prescriptions   Medication Sig    NITROSTAT 0.4 mg SL tablet PLACE 1 TABLET BY MOUTH UNDER TONGUE EVERY 5 MINUTES AS NEEDED    oxybutynin (DITROPAN) 5 mg tablet Take 1 Tab by mouth two (2) times a day.  simvastatin (ZOCOR) 10 mg tablet Take 1 Tab by mouth nightly.  silodosin (RAPAFLO) 8 mg capsule Take 1 Cap by mouth nightly.  atenolol (TENORMIN) 25 mg tablet TAKE 1/2 TABLET DAILY    colestipol (COLESTID) 1 gram tablet TAKE ONE TABLET BY MOUTH TWICE DAILY    albuterol (PROAIR HFA) 90 mcg/actuation inhaler Take 1 Puff by inhalation every four (4) hours as needed for Wheezing (or coughing spasms).  LACTOBACILLUS RHAMNOSUS GG (PROBIOTIC PO) Take  by mouth two (2) times a day.  Cholecalciferol, Vitamin D3, (VITAMIN D) 1,000 unit Cap Take  by mouth daily.  aspirin delayed-release 81 mg tablet Take 81 mg by mouth daily.  cyanocobalamin (VITAMIN B-12) 1,000 mcg tablet Take 1,000 mcg by mouth daily.  collagenase (SANTYL) 250 unit/gram ointment Apply  to affected area daily. Apply to left upper arm wound.  food supplemt, lactose-reduced (BOOST HIGH PROTEIN) liqd Take 237 mL by mouth three (3) times daily.      No current facility-administered medications for this visit. Patient Active Problem List    Diagnosis Date Noted    PAC (premature atrial contraction) 07/29/2014    Chronic anemia 02/05/2014    GERD (gastroesophageal reflux disease) 05/17/2012    Carotid artery disease ([facility] [address] 07/19/2011    Anemia 09/15/2010    Pure hypercholesterolemia 05/12/2010    Esophageal reflux 05/12/2010    BPH (benign prostatic hyperplasia) 05/12/2010    Gastritis 05/12/2010    CAD (coronary artery disease) 05/12/2010    Hiatal hernia 05/12/2010       Past Medical History:   Diagnosis Date    BPH (benign prostatic hyperplasia) 5/12/2010    CAD (coronary artery disease)     Carotid artery disease ([facility] [address] 7/19/2011    Esophageal reflux 5/12/2010    Gastritis 5/12/2010    Hiatal hernia 5/12/2010    History of melanoma     IBS (irritable bowel syndrome)     Melanoma ([facility] 5/12/2010    Pure hypercholesterolemia 5/12/2010    S/P CABG (coronary artery bypass graft)     Tooth fracture 2/14       No Known Allergies    Past Surgical History:   Procedure Laterality Date    ABDOMEN SURGERY PROC UNLISTED  1990    hernia, right inguinal    DUPLEX CAROTID BILATERAL  7/2011    Mild bilateral disease    ECHO 2D ADULT  2010    normal LV wall motion and ejection fraction, left atrial enlargement, mild aortic regurgitation, mild to moderate mitral regurgitation and mild tricuspid regurgitation. The ejection fraction was 55-60%.  HX CORONARY ARTERY BYPASS GRAFT  1995    LIMA to LAD, SVG to diagonal, SVG to trifurcation vessel and SVG to obtuse marginal.     HX HEART CATHETERIZATION  1995     Left ventricular wall motion is mild hypokinesis of the anterior wall. Ejection Fraction is estimated at 55%. The Coronary Angiography revealed:. LAD 80% stenosis. OM1 80% stenosis. RCA >90% stenosis.      HX HEENT      melanoma surgery x2    HX OTHER SURGICAL      Treadmill stress test 7/26/12 - walked 6:43, no chest pain, (8.0 METS); stopped for SOB; no ischemic EKG changes, frequent PVC's including in couplets. Also one brief run of NSVT (7 beats) during stage 2.     HX OTHER SURGICAL      Carotid dopplers 7/12  show 10-49% stenosis bilat.  HX OTHER SURGICAL      Exercise cardiolite 8/10/12 - walked 7 min without chest pain; EKG with anteroseptal infarct age undetermined; no ischemic EKG changes; short runs (7 beat) of NSVT during exercise. Normal myocardial perfusion and function. LVEF 69%     HX OTHER SURGICAL      Carotid duplex 6/20/14 - mild 10-49% stenosis bilat     STRESS TEST - GXT  7/2011    WNL       Social History     Social History    Marital status:      Spouse name: N/A    Number of children: N/A    Years of education: N/A     Social History Main Topics    Smoking status: Never Smoker    Smokeless tobacco: Never Used    Alcohol use 0.0 oz/week     0 Standard drinks or equivalent per week      Comment: Occasionally    Drug use: No    Sexual activity: Not Asked     Other Topics Concern    None     Social History Narrative       Review of Systems   Constitutional: Negative. Negative for chills, fever, malaise/fatigue and weight loss. He is getting febrile and walks very slowly. He still lives alone and he said he would continue to live at home until he cannot cook for himself anymore. HENT: Negative. Negative for hearing loss. Eyes: Negative. Negative for blurred vision and double vision. Respiratory: Negative. Negative for cough, hemoptysis, sputum production and shortness of breath. Cardiovascular: Negative. Negative for chest pain, palpitations and orthopnea. Gastrointestinal: Negative. Negative for abdominal pain, blood in stool, heartburn, nausea and vomiting. Genitourinary: Negative. Negative for dysuria, frequency and urgency. Musculoskeletal: Negative. Negative for back pain, myalgias and neck pain. Right fifth toe pain. Skin: Negative. Negative for rash. Neurological: Negative.   Negative for dizziness, tingling, tremors, weakness and headaches. Endo/Heme/Allergies: Negative. Psychiatric/Behavioral: Negative. Negative for depression. Objective:     Vitals:    07/24/18 1041 07/24/18 1059   BP: 141/60 125/53   Pulse: 78 84   Resp: 24    Temp: 97.8 °F (36.6 °C)    TempSrc: Oral    SpO2: 100%    Weight: 126 lb (57.2 kg)    Height: 5' 7\" (1.702 m)       Body mass index is 19.73 kg/(m^2). Physical Exam   Constitutional: He is oriented to person, place, and time and well-developed, well-nourished, and in no distress. HENT:   Head: Normocephalic and atraumatic. Mouth/Throat: Oropharynx is clear and moist.   Eyes: Right eye exhibits no discharge. Left eye exhibits no discharge. No scleral icterus. Neck: No tracheal deviation present. No thyromegaly present. No bruit. Cardiovascular: Normal rate, regular rhythm and normal heart sounds. Pulmonary/Chest: Effort normal and breath sounds normal.   Abdominal: Soft. He exhibits no distension. There is no tenderness. Neurological: He is alert and oriented to person, place, and time. Skin: No rash noted. No erythema. He has a soft corn on the lateral aspect of his fifth toe. This is the source of his pain. There is no ulcer and it does not need trimming. Psychiatric: Mood and affect normal.   Nursing note and vitals reviewed. There are no preventive care reminders to display for this patient. Assessment and orders:     Encounter Diagnoses     ICD-10-CM ICD-9-CM   1. Pure hypercholesterolemia E78.00 272.0   2. Gastroesophageal reflux disease without esophagitis K21.9 530.81   3. Benign prostatic hyperplasia with lower urinary tract symptoms, symptom details unspecified N40.1 600.01   4. Coronary artery disease involving native coronary artery of native heart without angina pectoris I25.10 414.01   5. Corn of toe L84 700     Diagnoses and all orders for this visit:    1.  Pure hypercholesterolemia-retest  -     LIPID PANEL  - METABOLIC PANEL, COMPREHENSIVE    2. Gastroesophageal reflux disease without esophagitis-symptoms controlled  -     METABOLIC PANEL, COMPREHENSIVE  -     HEMOGLOBIN    3. Benign prostatic hyperplasia with lower urinary tract symptoms, symptom details unspecified-symptoms controlled    4. Coronary artery disease involving native coronary artery of native heart without angina pectoris-no current CAD symptoms  -     LIPID PANEL  -     METABOLIC PANEL, COMPREHENSIVE    5. Corn of toe-soft corn, use corn pad as instructed  . Plan of care:  Discussed diagnoses in detail with patient. Medication risks/benefits/side effects discussed with patient. All of the patient's questions were addressed. The patient understands and agrees with our plan of care. The patient knows to call back if they are unsure of or forget any changes we discussed today or if the symptoms change. The patient received an After-Visit Summary which contains VS, orders, medication list and allergy list. This can be used as a \"mini-medical record\" should they have to seek medical care while out of town. Patient Care Team:  Deon Moreau MD as PCP - General  Yoan Rosa MD as Physician (Dermatology)  Karla Hernandes MD as Physician (Gastroenterology)  Marielle Shepherd MD (Cardiology)  Dorothy Rojas MD (Urology)    Follow-up Disposition:  Return in about 6 months (around 1/24/2019) for fasting lab appt. .    Future Appointments  Date Time Provider Taisha Aguero   7/25/2018 8:30 AM LAB [facility] SCHED   8/2/2018 1:20 PM Abby Walker MD [facility] SCHED   1/28/2019 8:00 AM Deon Moreau MD [facility] SCHED       Signed By: Deon Moreau MD     July 24, 2018

## 2018-07-26 LAB
ALBUMIN SERPL-MCNC: 4.1 G/DL (ref 3.2–4.6)
ALBUMIN/GLOB SERPL: 1.4 {RATIO} (ref 1.2–2.2)
ALP SERPL-CCNC: 96 IU/L (ref 39–117)
ALT SERPL-CCNC: 10 IU/L (ref 0–44)
AST SERPL-CCNC: 17 IU/L (ref 0–40)
BILIRUB SERPL-MCNC: 0.4 MG/DL (ref 0–1.2)
BUN SERPL-MCNC: 18 MG/DL (ref 10–36)
BUN/CREAT SERPL: 23 (ref 10–24)
CALCIUM SERPL-MCNC: 9.2 MG/DL (ref 8.6–10.2)
CHLORIDE SERPL-SCNC: 102 MMOL/L (ref 96–106)
CHOLEST SERPL-MCNC: 125 MG/DL (ref 100–199)
CO2 SERPL-SCNC: 24 MMOL/L (ref 20–29)
CREAT SERPL-MCNC: 0.79 MG/DL (ref 0.76–1.27)
GLOBULIN SER CALC-MCNC: 3 G/DL (ref 1.5–4.5)
GLUCOSE SERPL-MCNC: 88 MG/DL (ref 65–99)
HDLC SERPL-MCNC: 39 MG/DL
HGB BLD-MCNC: 10.7 G/DL (ref 13–17.7)
LDLC SERPL CALC-MCNC: 58 MG/DL (ref 0–99)
POTASSIUM SERPL-SCNC: 3.9 MMOL/L (ref 3.5–5.2)
PROT SERPL-MCNC: 7.1 G/DL (ref 6–8.5)
SODIUM SERPL-SCNC: 140 MMOL/L (ref 134–144)
TRIGL SERPL-MCNC: 138 MG/DL (ref 0–149)
VLDLC SERPL CALC-MCNC: 28 MG/DL (ref 5–40)

## 2018-08-02 ENCOUNTER — OFFICE VISIT (OUTPATIENT)
Dept: CARDIOLOGY CLINIC | Age: 83
End: 2018-08-02

## 2018-08-02 VITALS
DIASTOLIC BLOOD PRESSURE: 62 MMHG | BODY MASS INDEX: 20.15 KG/M2 | OXYGEN SATURATION: 97 % | RESPIRATION RATE: 14 BRPM | SYSTOLIC BLOOD PRESSURE: 118 MMHG | WEIGHT: 128.4 LBS | HEART RATE: 73 BPM | HEIGHT: 67 IN

## 2018-08-02 DIAGNOSIS — E78.00 PURE HYPERCHOLESTEROLEMIA: Chronic | ICD-10-CM

## 2018-08-02 DIAGNOSIS — I25.10 CORONARY ARTERY DISEASE INVOLVING NATIVE CORONARY ARTERY OF NATIVE HEART WITHOUT ANGINA PECTORIS: Primary | Chronic | ICD-10-CM

## 2018-08-02 NOTE — PROGRESS NOTES
Chief Complaint   Patient presents with    Follow-up     6 mths     1. Have you been to the ER, urgent care clinic since your last visit? Hospitalized since your last visit? No    2. Have you seen or consulted any other health care providers outside of the [address] [place] [address] since your last visit? Include any pap smears or colon screening.  No    Visit Vitals    /62 (BP 1 Location: Left arm, BP Patient Position: Sitting)    Pulse 73    Resp 14    Ht 5' 7\" (1.702 m)    Wt 128 lb 6.4 oz (58.2 kg)    SpO2 97%    BMI 20.11 kg/m2

## 2018-08-02 NOTE — PROGRESS NOTES
Abelardo Lu MD. [facility]              Patient: Tiffanie Orlelana  : 8/15/1923      Today's Date: 2018            HISTORY OF PRESENT ILLNESS:     History of Present Illness:  Since I last saw him, he fell in April while changing light on flag pole - did not have his cane - he lay there for 3 hours until someone saw him - he did not go to hospital then. Since then fair - legs give out on him. PAST MEDICAL HISTORY:     Past Medical History:   Diagnosis Date    BPH (benign prostatic hyperplasia) 2010    CAD (coronary artery disease)     Carotid artery disease ([facility] [address] 2011    Esophageal reflux 2010    Gastritis 2010    Hiatal hernia 2010    History of melanoma     IBS (irritable bowel syndrome)     Melanoma ([facility] [address] 2010    Pure hypercholesterolemia 2010    S/P CABG (coronary artery bypass graft)     Tooth fracture          Past Surgical History:   Procedure Laterality Date    ABDOMEN SURGERY PROC UNLISTED      hernia, right inguinal    DUPLEX CAROTID BILATERAL  2011    Mild bilateral disease    ECHO 2D ADULT      normal LV wall motion and ejection fraction, left atrial enlargement, mild aortic regurgitation, mild to moderate mitral regurgitation and mild tricuspid regurgitation. The ejection fraction was 55-60%.  HX CORONARY ARTERY BYPASS GRAFT      LIMA to LAD, SVG to diagonal, SVG to trifurcation vessel and SVG to obtuse marginal.     HX HEART CATHETERIZATION       Left ventricular wall motion is mild hypokinesis of the anterior wall. Ejection Fraction is estimated at 55%. The Coronary Angiography revealed:. LAD 80% stenosis. OM1 80% stenosis. RCA >90% stenosis.  HX HEENT      melanoma surgery x2    HX OTHER SURGICAL      Treadmill stress test 12 - walked 6:43, no chest pain, (8.0 METS); stopped for SOB; no ischemic EKG changes, frequent PVC's including in couplets.  Also one brief run of NSVT (7 beats) during stage 2.     HX OTHER SURGICAL      Carotid dopplers 7/12  show 10-49% stenosis bilat.  HX OTHER SURGICAL      Exercise cardiolite 8/10/12 - walked 7 min without chest pain; EKG with anteroseptal infarct age undetermined; no ischemic EKG changes; short runs (7 beat) of NSVT during exercise. Normal myocardial perfusion and function. LVEF 69%     HX OTHER SURGICAL      Carotid duplex 6/20/14 - mild 10-49% stenosis bilat     STRESS TEST - GXT  7/2011    WNL           MEDICATIONS:     Current Outpatient Prescriptions   Medication Sig Dispense Refill    NITROSTAT 0.4 mg SL tablet PLACE 1 TABLET BY MOUTH UNDER TONGUE EVERY 5 MINUTES AS NEEDED 25 Tab 3    oxybutynin (DITROPAN) 5 mg tablet Take 1 Tab by mouth two (2) times a day. (Patient taking differently: Take 5 mg by mouth daily.) 60 Tab 5    simvastatin (ZOCOR) 10 mg tablet Take 1 Tab by mouth nightly. 90 Tab 1    collagenase (SANTYL) 250 unit/gram ointment Apply  to affected area daily. Apply to left upper arm wound. 15 g 0    silodosin (RAPAFLO) 8 mg capsule Take 1 Cap by mouth nightly. 90 Cap 2    atenolol (TENORMIN) 25 mg tablet TAKE 1/2 TABLET DAILY 45 Tab 3    colestipol (COLESTID) 1 gram tablet TAKE ONE TABLET BY MOUTH TWICE DAILY 60 Tab 11    food supplemt, lactose-reduced (BOOST HIGH PROTEIN) liqd Take 237 mL by mouth three (3) times daily.  albuterol (PROAIR HFA) 90 mcg/actuation inhaler Take 1 Puff by inhalation every four (4) hours as needed for Wheezing (or coughing spasms). 1 Inhaler 2    LACTOBACILLUS RHAMNOSUS GG (PROBIOTIC PO) Take  by mouth two (2) times a day.  Cholecalciferol, Vitamin D3, (VITAMIN D) 1,000 unit Cap Take  by mouth daily.  aspirin delayed-release 81 mg tablet Take 81 mg by mouth daily.  cyanocobalamin (VITAMIN B-12) 1,000 mcg tablet Take 1,000 mcg by mouth daily.          No Known Allergies          SOCIAL HISTORY:     Social History   Substance Use Topics    Smoking status: Never Smoker    Smokeless tobacco: Never Used    Alcohol use 0.0 oz/week     0 Standard drinks or equivalent per week      Comment: Occasionally           FAMILY HISTORY:     Family History   Problem Relation Age of Onset    Heart Disease Mother     Heart Disease Father             REVIEW OF SYSTEMS:         Review of Systems:    Constitutional: Negative for fever, chills    HEENT: Negative for nosebleeds    Respiratory: No SOB  Cardiovascular: Negative for syncope, orthopnea, and PND.    Gastrointestinal: Negative for melena.    Genitourinary: Negative for dysuria    Musculoskeletal: Negative for myalgias.    Skin: Negative for rash    Heme: No problems bleeding.    Neurological: Negative for speech change and focal weakness.    + IBS           PHYSICAL EXAM:         Physical Exam:       Visit Vitals    /62 (BP 1 Location: Left arm, BP Patient Position: Sitting)    Pulse 73    Resp 14    Ht 5' 7\" (1.702 m)    Wt 128 lb 6.4 oz (58.2 kg)    SpO2 97%    BMI 20.11 kg/m2       Patient appears generally well, mood and affect are appropriate and pleasant.    HEENT: Normocephalic, atraumatic. Sclera anicteric. Hearing intact  Neck Exam: Supple, no JVD sitting up. + left neck bruit    Lung Exam: Clear to auscultation, even breath sounds.    Cardiac Exam: Regular rate and rhythm with no murmur    Abdomen: Soft, non-tender, normal bowel sounds.   Extremities: Trace lower extremity edema.  MAW.    Psych - appropriate affect  Neuro - non focal               LABS / OTHER STUDIES:         Lab Results   Component Value Date/Time    Sodium 140 07/25/2018 12:24 PM    Potassium 3.9 07/25/2018 12:24 PM    Chloride 102 07/25/2018 12:24 PM    CO2 24 07/25/2018 12:24 PM    Anion gap 9 09/14/2010 08:42 AM    Glucose 88 07/25/2018 12:24 PM    BUN 18 07/25/2018 12:24 PM    Creatinine 0.79 07/25/2018 12:24 PM    BUN/Creatinine ratio 23 07/25/2018 12:24 PM    GFR est AA 89 07/25/2018 12:24 PM    GFR est non-AA 77 07/25/2018 12:24 PM    Calcium 9.2 07/25/2018 12:24 PM    Bilirubin, total 0.4 07/25/2018 12:24 PM    AST (SGOT) 17 07/25/2018 12:24 PM    Alk. phosphatase 96 07/25/2018 12:24 PM    Protein, total 7.1 07/25/2018 12:24 PM    Albumin 4.1 07/25/2018 12:24 PM    Globulin 3.4 09/14/2010 08:42 AM    A-G Ratio 1.4 07/25/2018 12:24 PM    ALT (SGPT) 10 07/25/2018 12:24 PM       Lab Results   Component Value Date/Time    WBC 6.2 04/09/2018 08:37 AM    HGB 10.7 (L) 07/25/2018 12:24 PM    HCT 34.9 (L) 04/09/2018 08:37 AM    PLATELET 104 87/93/8333 08:37 AM    MCV 94 04/09/2018 08:37 AM       Lab Results   Component Value Date/Time    Cholesterol, total 125 07/25/2018 12:24 PM    HDL Cholesterol 39 (L) 07/25/2018 12:24 PM    LDL, calculated 58 07/25/2018 12:24 PM    VLDL, calculated 28 07/25/2018 12:24 PM    Triglyceride 138 07/25/2018 12:24 PM    CHOL/HDL Ratio 3.7 09/14/2010 08:42 AM              CARDIAC DIAGNOSTICS:         Cardiac Evaluation Includes:  EKG 6/20/14 - sinus bradycardia, 1st degree AV block ()    EKG 1/8/15 - NSR, 1st degree AVB, PRWP, PVC  EKG 1/12/16 - NSR, 1st degree AV block, old anterior infarct   EKG 1/20/17 - NSR, 1st degree AVB, possible old septal infarct    EKG 1/26/18 - NSR, 1st degree AVB, possible old septal infarct        Carotid Doppler 6/14 - 10-49% stenosis bilat    Holter 7/14 - NSR, some PAC and PVC's  Echo 12/3/15 - LVEF 65 %. Grade 1 diastolic dysfunction. RV size upper normal. Mild LAE. Mild-mod MR. Mild AI. Mod TR. RVSP 47.        CXR 11/23/15 - IMPRESSION: Mild pulmonary interstitial edema. No evidence of pneumonia.               ASSESSMENT AND PLAN:         Assessment and Plan:    1) CAD -    - Mr. Ray Lara denies any anginal complaints    - Continue medical management of CAD   - Given muscle weakness, will stop simvastatin just in case it may be causing any trouble       2) Mild Carotid disease  - Carotid duplex 6/20/14 - mild 10-49% stenosis bilat    - on a statin and ASA      3) Dyslipidemia  - Given muscle weakness, will stop simvastatin just in case it may be causing any trouble   - See above      4) HTN    - BP OK on low dose atenolol without orthostatic symptoms   - continue meds       5) RTC in 6 months.  Patient expressed understanding of the plan - questions were answered.       On a side note he was in the [facility] during [facility] in [facility]. [address] Delfino Huggins MD, [address]  [address] [address]                [address], [address] [place] [address], [address] [place] [address]  Ph: [phone]                                                             Ph [phone]

## 2018-08-02 NOTE — MR AVS SNAPSHOT
[address] [facility] 600 1007 [facility] 
[phone] Patient: Morena Parrish MRN:  [id number] Visit Information Date & Time Provider Department Dept. Phone Encounter #  
 2018  1:20 PM Ulysses Chum, MD [facility] [place] [phone] [id number] Your Appointments 2018  1:20 PM  
ESTABLISHED PATIENT with Ulysses Chum, MD  
[facility]) Appt Note: 6 mo fu; 18 lft msg to r/s 18 ov w/ dr [employer]; 6 mo fu 18 spoke with pt to rs ok per perla l  
 320 [facility] 600 1007 [facility]  
[address] [facility] 501 [facility] [address]  
  
    
 2019  8:00 AM  
ROUTINE CARE with Raymond Barrera MD  
[address] [place] [address] Appt Note: 6 MONTH F/U/Hypercholesterolemia,  
 [address] [facility] [address] [address]  
[facility] [address] [place] [address] Upcoming Health Maintenance Date Due Influenza Age 5 to Adult 2018 GLAUCOMA SCREENING Q2Y 2018 MEDICARE YEARLY EXAM 2019 DTaP/Tdap/Td series (2 - Td) 2026 Allergies as of 2018  Review Complete On: 2018 By: Ulysses Chum, MD  
 No Known Allergies Current Immunizations  Reviewed on 2016 Name Date Influenza High Dose Vaccine PF 2017 Influenza Vaccine 10/7/2015, 10/14/2014, 2013 Influenza Vaccine (Quad) PF 2016 Influenza Vaccine Split 10/19/2011, 10/19/2005 PPD 2009 Pneumococcal Conjugate (PCV-13) 2015 TD Vaccine 2010, 5/15/2006, 10/22/2002 Tdap 2016 ZZZ-RETIRED (DO NOT USE) Pneumococcal Vaccine (Unspecified Type) 2010, 2000 Not reviewed this visit Vitals BP Pulse Resp Height(growth percentile) Weight(growth percentile) SpO2  
 118/62 (BP 1 Location: Left arm, BP Patient Position: Sitting) 73 14 5' 7\" (1.702 m) 128 lb 6.4 oz (58.2 kg) 97% BMI Smoking Status 20.11 kg/m2 Never Smoker Vitals History BMI and BSA Data Body Mass Index Body Surface Area  
 20.11 kg/m 2 1.66 m 2 Preferred Pharmacy Pharmacy Name Phone [address] [place], [place] [address] [phone] Your Updated Medication List  
  
   
This list is accurate as of 8/2/18  1:18 PM.  Always use your most recent med list.  
  
  
  
  
 albuterol 90 mcg/actuation inhaler Commonly known as:  PROAIR HFA Take 1 Puff by inhalation every four (4) hours as needed for Wheezing (or coughing spasms). aspirin delayed-release 81 mg tablet Take 81 mg by mouth daily. atenolol 25 mg tablet Commonly known as:  TENORMIN  
TAKE 1/2 TABLET DAILY  
  
 BOOST HIGH PROTEIN Liqd Generic drug:  food supplemt, lactose-reduced Take 237 mL by mouth three (3) times daily. colestipol 1 gram tablet Commonly known as:  COLESTID  
TAKE ONE TABLET BY MOUTH TWICE DAILY  
  
 collagenase 250 unit/gram ointment Commonly known as:  SANTYL Apply  to affected area daily. Apply to left upper arm wound. NITROSTAT 0.4 mg SL tablet Generic drug:  nitroglycerin PLACE 1 TABLET BY MOUTH UNDER TONGUE EVERY 5 MINUTES AS NEEDED  
  
 oxybutynin 5 mg tablet Commonly known as:  MMEOOOJZ Take 1 Tab by mouth two (2) times a day. PROBIOTIC PO Take  by mouth two (2) times a day. silodosin 8 mg capsule Commonly known as:  RAPAFLO Take 1 Cap by mouth nightly. simvastatin 10 mg tablet Commonly known as:  ZOCOR Take 1 Tab by mouth nightly. VITAMIN B-12 1,000 mcg tablet Generic drug:  cyanocobalamin Take 1,000 mcg by mouth daily. VITAMIN D3 1,000 unit Cap Generic drug:  cholecalciferol Take  by mouth daily. Introducing [facility] & HEALTH SERVICES! Chris Alicea introduces Vatler patient portal. Now you can access parts of your medical record, email your doctor's office, and request medication refills online. 1. In your internet browser, go to [email or web address] 2. Click on the First Time User? Click Here link in the Sign In box. You will see the New Member Sign Up page. 3. Enter your Vatler Access Code exactly as it appears below. You will not need to use this code after youve completed the sign-up process. If you do not sign up before the expiration date, you must request a new code. · Vatler Access Code: [email or web address] Expires: 10/22/2018 10:31 AM 
 
4. Enter the last four digits of your Social Security Number (xxxx) and Date of Birth (mm/dd/yyyy) as indicated and click Submit. You will be taken to the next sign-up page. 5. Create a Vatler ID. This will be your Vatler login ID and cannot be changed, so think of one that is secure and easy to remember. 6. Create a Vatler password. You can change your password at any time. 7. Enter your Password Reset Question and Answer. This can be used at a later time if you forget your password. 8. Enter your e-mail address. You will receive e-mail notification when new information is available in [address] 9. Click Sign Up. You can now view and download portions of your medical record. 10. Click the Download Summary menu link to download a portable copy of your medical information. If you have questions, please visit the Frequently Asked Questions section of the Vatler website. Remember, Vatler is NOT to be used for urgent needs. For medical emergencies, dial 911. Now available from your iPhone and Android! Please provide this summary of care documentation to your next provider. Your primary care clinician is listed as Πάν[address] 90.  If you have any questions after today's visit, please call [phone].

## 2018-08-23 ENCOUNTER — OFFICE VISIT (OUTPATIENT)
Dept: FAMILY MEDICINE CLINIC | Age: 83
End: 2018-08-23

## 2018-08-23 VITALS
BODY MASS INDEX: 19.78 KG/M2 | HEIGHT: 67 IN | OXYGEN SATURATION: 100 % | RESPIRATION RATE: 16 BRPM | WEIGHT: 126 LBS | SYSTOLIC BLOOD PRESSURE: 138 MMHG | TEMPERATURE: 97.4 F | HEART RATE: 74 BPM | DIASTOLIC BLOOD PRESSURE: 68 MMHG

## 2018-08-23 RX ORDER — BACITRACIN 500 [USP'U]/G
OINTMENT TOPICAL 3 TIMES DAILY
COMMUNITY
End: 2018-10-01

## 2018-08-23 NOTE — PROGRESS NOTES
[facility]    Subjective:   William Whitaker is a 80 y.o. male with history of ***  CC: ***  History provided by patient and Records    HPI:  Patient with an irritated mole on next to the left labial fold. Denies drainage, bleeding. States \"It is in the way\". Denies history of skin cancer, though melanoma is listed     PFSH:     Current Outpatient Prescriptions on File Prior to Visit   Medication Sig Dispense Refill    NITROSTAT 0.4 mg SL tablet PLACE 1 TABLET BY MOUTH UNDER TONGUE EVERY 5 MINUTES AS NEEDED 25 Tab 3    oxybutynin (DITROPAN) 5 mg tablet Take 1 Tab by mouth two (2) times a day. (Patient taking differently: Take 5 mg by mouth daily.) 60 Tab 5    silodosin (RAPAFLO) 8 mg capsule Take 1 Cap by mouth nightly. 90 Cap 2    atenolol (TENORMIN) 25 mg tablet TAKE 1/2 TABLET DAILY 45 Tab 3    colestipol (COLESTID) 1 gram tablet TAKE ONE TABLET BY MOUTH TWICE DAILY 60 Tab 11    food supplemt, lactose-reduced (BOOST HIGH PROTEIN) liqd Take 237 mL by mouth three (3) times daily.  LACTOBACILLUS RHAMNOSUS GG (PROBIOTIC PO) Take  by mouth two (2) times a day.  Cholecalciferol, Vitamin D3, (VITAMIN D) 1,000 unit Cap Take  by mouth daily.  aspirin delayed-release 81 mg tablet Take 81 mg by mouth daily.  cyanocobalamin (VITAMIN B-12) 1,000 mcg tablet Take 1,000 mcg by mouth daily.  collagenase (SANTYL) 250 unit/gram ointment Apply  to affected area daily. Apply to left upper arm wound. 15 g 0    albuterol (PROAIR HFA) 90 mcg/actuation inhaler Take 1 Puff by inhalation every four (4) hours as needed for Wheezing (or coughing spasms). 1 Inhaler 2     No current facility-administered medications on file prior to visit.         Patient Active Problem List   Diagnosis Code    Pure hypercholesterolemia E78.00    Esophageal reflux K21.9    BPH (benign prostatic hyperplasia) N40.0    Gastritis K29.70    CAD (coronary artery disease) I25.10    Hiatal hernia K44.9    Anemia D64.9    Carotid artery disease (HCC) I77.9    GERD (gastroesophageal reflux disease) K21.9    Chronic anemia D64.9    PAC (premature atrial contraction) I49.1       Social History     Social History    Marital status:      Spouse name: N/A    Number of children: N/A    Years of education: N/A     Occupational History    Not on file. Social History Main Topics    Smoking status: Never Smoker    Smokeless tobacco: Never Used    Alcohol use 0.0 oz/week     0 Standard drinks or equivalent per week      Comment: Occasionally    Drug use: No    Sexual activity: Not on file     Other Topics Concern    Not on file     Social History Narrative       ROS      Objective:     Visit Vitals    /68 (BP 1 Location: Left arm, BP Patient Position: Sitting)    Pulse 74    Temp 97.4 °F (36.3 °C) (Oral)    Resp 16    Ht 5' 7\" (1.702 m)    Wt 126 lb (57.2 kg)    SpO2 100%    BMI 19.73 kg/m2          Physical Exam    Pertinent Labs/Studies:      Assessment and orders:   {ASSESSMENT/PLAN:67411}      I have discussed the diagnosis with the patient and the intended plan as seen in the above orders. Social history, medical history, and labs were reviewed. The patient has received an after-visit summary and questions were answered concerning future plans. I have discussed medication side effects and warnings with the patient as well.     Moises Dozier MD  Resident [facility]  08/23/18

## 2018-08-23 NOTE — PROGRESS NOTES
Chief Complaint   Patient presents with    Skin Problem       Body mass index is 19.73 kg/(m^2). 1. Have you been to the ER, urgent care clinic since your last visit? Hospitalized since your last visit? No    2. Have you seen or consulted any other health care providers outside of the [address] [place] [address] since your last visit? Include any pap smears or colon screening.  No    Reviewed record in preparation for visit and have necessary documentation  Pt did not bring medication to office visit for review  Information was given to pt on Advanced Directives, Living Will  Opportunity was given for questions  Goals that were addressed and/or need to be completed after this appointment include:     Health Maintenance Due   Topic Date Due    Influenza Age 5 to Adult  08/01/2018

## 2018-08-30 ENCOUNTER — OFFICE VISIT (OUTPATIENT)
Dept: FAMILY MEDICINE CLINIC | Age: 83
End: 2018-08-30

## 2018-08-30 VITALS
BODY MASS INDEX: 19.84 KG/M2 | HEART RATE: 70 BPM | TEMPERATURE: 97.3 F | OXYGEN SATURATION: 98 % | WEIGHT: 126.4 LBS | DIASTOLIC BLOOD PRESSURE: 68 MMHG | RESPIRATION RATE: 20 BRPM | HEIGHT: 67 IN | SYSTOLIC BLOOD PRESSURE: 132 MMHG

## 2018-08-30 DIAGNOSIS — L57.0 ACTINIC KERATOSIS: Primary | ICD-10-CM

## 2018-08-30 DIAGNOSIS — L98.9 SKIN LESION: ICD-10-CM

## 2018-08-30 NOTE — PROGRESS NOTES
1. Have you been to the ER, urgent care clinic since your last visit? Hospitalized since your last visit? No    2. Have you seen or consulted any other health care providers outside of the [facility] since your last visit? Include any pap smears or colon screening.  No  Reviewed record in preparation for visit and have necessary documentation  Pt did not bring medication to office visit for review  Goals that were addressed and/or need to be completed during or after this appointment include     Health Maintenance Due   Topic Date Due    Influenza Age 5 to Adult  08/01/2018

## 2018-08-30 NOTE — MR AVS SNAPSHOT
[address] [facility] 
 
 
  [facility] [address] [address] 
[phone] Patient: Cory Oscar MRN: [id number] :8/15/1923 Visit Information Date & Time Provider Department Dept. Phone Encounter #  
 2018  9:00 AM Cindy Jackson MD [address] [id number] Your Appointments 2019  8:00 AM  
ROUTINE CARE with Karen Tabares MD  
[address] [place] [address]) Appt Note: 6 MONTH F/U/Hypercholesterolemia,  
 [address] [facility] [address]  
  
   
 [facility] [address] [place] [address]  
  
    
 2019  1:00 PM  
ESTABLISHED PATIENT with Markus Rodriguez MD  
[facility] ([address]) Appt Note: 6 month follow up  
 205 [facility] 600 1007 [address] [address] [address] Upcoming Health Maintenance Date Due Influenza Age 5 to Adult 2018 GLAUCOMA SCREENING Q2Y 2018 MEDICARE YEARLY EXAM 2019 DTaP/Tdap/Td series (2 - Td) 2026 Allergies as of 2018  Review Complete On: 2018 By: Dea Weaver LPN No Known Allergies Current Immunizations  Reviewed on 2016 Name Date Influenza High Dose Vaccine PF 2017 Influenza Vaccine 10/7/2015, 10/14/2014, 2013 Influenza Vaccine (Quad) PF 2016 Influenza Vaccine Split 10/19/2011, 10/19/2005 PPD 2009 Pneumococcal Conjugate (PCV-13) 2015 TD Vaccine 2010, 5/15/2006, 10/22/2002 Tdap 2016 ZZZ-RETIRED (DO NOT USE) Pneumococcal Vaccine (Unspecified Type) 2010, 2000 Not reviewed this visit You Were Diagnosed With   
  
 Codes Comments Actinic keratosis    -  Primary ICD-10-CM: L57.0 ICD-9-CM: 702.0 Skin lesion     ICD-10-CM: L98.9 ICD-9-CM: 709.9 Vitals BP Pulse Temp Resp Height(growth percentile) Weight(growth percentile) 132/68 (BP 1 Location: Left arm, BP Patient Position: Sitting) 70 97.3 °F (36.3 °C) (Oral) 20 5' 7\" (1.702 m) 126 lb 6.4 oz (57.3 kg) SpO2 BMI Smoking Status 98% 19.8 kg/m2 Never Smoker Vitals History BMI and BSA Data Body Mass Index Body Surface Area  
 19.8 kg/m 2 1.65 m 2 Preferred Pharmacy Pharmacy Name Phone [address] [place], [place] - [address] [phone] Your Updated Medication List  
  
   
This list is accurate as of 8/30/18  9:41 AM.  Always use your most recent med list.  
  
  
  
  
 albuterol 90 mcg/actuation inhaler Commonly known as:  PROAIR HFA Take 1 Puff by inhalation every four (4) hours as needed for Wheezing (or coughing spasms). aspirin delayed-release 81 mg tablet Take 81 mg by mouth daily. atenolol 25 mg tablet Commonly known as:  TENORMIN  
TAKE 1/2 TABLET DAILY  
  
 bacitracin 500 unit/gram Oint Commonly known as:  BACITRACIN Apply  to affected area three (3) times daily. BOOST HIGH PROTEIN Liqd Generic drug:  food supplemt, lactose-reduced Take 237 mL by mouth three (3) times daily. colestipol 1 gram tablet Commonly known as:  COLESTID  
TAKE ONE TABLET BY MOUTH TWICE DAILY  
  
 collagenase 250 unit/gram ointment Commonly known as:  SANTYL Apply  to affected area daily. Apply to left upper arm wound. NITROSTAT 0.4 mg SL tablet Generic drug:  nitroglycerin PLACE 1 TABLET BY MOUTH UNDER TONGUE EVERY 5 MINUTES AS NEEDED  
  
 oxybutynin 5 mg tablet Commonly known as:  LZOLMDUI Take 1 Tab by mouth two (2) times a day. PROBIOTIC PO Take  by mouth two (2) times a day. silodosin 8 mg capsule Commonly known as:  RAPAFLO Take 1 Cap by mouth nightly. VITAMIN B-12 1,000 mcg tablet Generic drug:  cyanocobalamin Take 1,000 mcg by mouth daily. VITAMIN D3 1,000 unit Cap Generic drug:  cholecalciferol Take  by mouth daily. Introducing [facility] & HEALTH SERVICES! [facility] introduces VivaSmart patient portal. Now you can access parts of your medical record, email your doctor's office, and request medication refills online. 1. In your internet browser, go to [email or web address] 2. Click on the First Time User? Click Here link in the Sign In box. You will see the New Member Sign Up page. 3. Enter your VivaSmart Access Code exactly as it appears below. You will not need to use this code after youve completed the sign-up process. If you do not sign up before the expiration date, you must request a new code. · VivaSmart Access Code: [email or web address] Expires: 10/22/2018 10:31 AM 
 
4. Enter the last four digits of your Social Security Number (xxxx) and Date of Birth (mm/dd/yyyy) as indicated and click Submit. You will be taken to the next sign-up page. 5. Create a VivaSmart ID. This will be your VivaSmart login ID and cannot be changed, so think of one that is secure and easy to remember. 6. Create a VivaSmart password. You can change your password at any time. 7. Enter your Password Reset Question and Answer. This can be used at a later time if you forget your password. 8. Enter your e-mail address. You will receive e-mail notification when new information is available in [address] 9. Click Sign Up. You can now view and download portions of your medical record. 10. Click the Download Summary menu link to download a portable copy of your medical information. If you have questions, please visit the Frequently Asked Questions section of the VivaSmart website. Remember, VivaSmart is NOT to be used for urgent needs. For medical emergencies, dial 911. Now available from your iPhone and Android! Please provide this summary of care documentation to your next provider. Your primary care clinician is listed as [address] 90. If you have any questions after today's visit, please call [phone].

## 2018-08-31 NOTE — PROGRESS NOTES
I saw and evaluated the patient with the resident, performing the key elements of the exam and service. I discussed the findings, assessment and plan with the resident and agree with the resident's findings and plan as documented in the resident's note. This lesion looks like an irritated actinic keratosis. Freezing should be sufficient. He will be seen in follow-up to make sure referral is not necessary. Elias Hassan M.D.

## 2018-08-31 NOTE — PROGRESS NOTES
[facility]    Subjective:   Tiffanie Morton is a 80 y.o. male with history of skin lesions  CC: Skin lesion removal  History provided by patient and Records    HPI:  Presence of actinic keratosis on the left face. Irritating at times. Denies any bleeding or drainage. History of previous lesions. PFSH:     Current Outpatient Prescriptions on File Prior to Visit   Medication Sig Dispense Refill    bacitracin (BACITRACIN) 500 unit/gram oint Apply  to affected area three (3) times daily.  NITROSTAT 0.4 mg SL tablet PLACE 1 TABLET BY MOUTH UNDER TONGUE EVERY 5 MINUTES AS NEEDED 25 Tab 3    oxybutynin (DITROPAN) 5 mg tablet Take 1 Tab by mouth two (2) times a day. (Patient taking differently: Take 5 mg by mouth daily.) 60 Tab 5    silodosin (RAPAFLO) 8 mg capsule Take 1 Cap by mouth nightly. 90 Cap 2    atenolol (TENORMIN) 25 mg tablet TAKE 1/2 TABLET DAILY 45 Tab 3    colestipol (COLESTID) 1 gram tablet TAKE ONE TABLET BY MOUTH TWICE DAILY 60 Tab 11    food supplemt, lactose-reduced (BOOST HIGH PROTEIN) liqd Take 237 mL by mouth three (3) times daily.  albuterol (PROAIR HFA) 90 mcg/actuation inhaler Take 1 Puff by inhalation every four (4) hours as needed for Wheezing (or coughing spasms). 1 Inhaler 2    LACTOBACILLUS RHAMNOSUS GG (PROBIOTIC PO) Take  by mouth two (2) times a day.  Cholecalciferol, Vitamin D3, (VITAMIN D) 1,000 unit Cap Take  by mouth daily.  aspirin delayed-release 81 mg tablet Take 81 mg by mouth daily.  cyanocobalamin (VITAMIN B-12) 1,000 mcg tablet Take 1,000 mcg by mouth daily.  collagenase (SANTYL) 250 unit/gram ointment Apply  to affected area daily. Apply to left upper arm wound. 15 g 0     No current facility-administered medications on file prior to visit.         Patient Active Problem List   Diagnosis Code    Pure hypercholesterolemia E78.00    Esophageal reflux K21.9    BPH (benign prostatic hyperplasia) N40.0    Gastritis K29.70    CAD (coronary artery disease) I25.10    Hiatal hernia K44.9    Anemia D64.9    Carotid artery disease (HCC) I77.9    GERD (gastroesophageal reflux disease) K21.9    Chronic anemia D64.9    PAC (premature atrial contraction) I49.1       Social History     Social History    Marital status:      Spouse name: N/A    Number of children: N/A    Years of education: N/A     Occupational History    Not on file. Social History Main Topics    Smoking status: Never Smoker    Smokeless tobacco: Never Used    Alcohol use 0.0 oz/week     0 Standard drinks or equivalent per week      Comment: Occasionally    Drug use: No    Sexual activity: Not on file     Other Topics Concern    Not on file     Social History Narrative       Review of Systems   Constitutional: Negative for malaise/fatigue. Skin:        Lesion         Objective:     Visit Vitals    /68 (BP 1 Location: Left arm, BP Patient Position: Sitting)    Pulse 70    Temp 97.3 °F (36.3 °C) (Oral)    Resp 20    Ht 5' 7\" (1.702 m)    Wt 126 lb 6.4 oz (57.3 kg)    SpO2 98%    BMI 19.8 kg/m2          Physical Exam   Constitutional: He appears well-developed and well-nourished. No distress. HENT:   Head: Normocephalic and atraumatic. Cardiovascular: Normal rate, regular rhythm, normal heart sounds and intact distal pulses. Pulmonary/Chest: Effort normal and breath sounds normal.   Skin: Skin is warm and dry. Skin lesion of just left/lateral to the nares. Nursing note and vitals reviewed. Pertinent Labs/Studies:      Assessment and orders:       ICD-10-CM ICD-9-CM    1. Actinic keratosis L57.0 702.0 DESTRUC PREMALIGNANT, FIRST LESION   2. Skin lesion L98.9 709.9 DESTRUC PREMALIGNANT, FIRST LESION     Diagnoses and all orders for this visit:    1. Actinic keratosis: See attached procedure note  -     DESTRUC PREMALIGNANT, FIRST LESION    2.  Skin lesion  -     DESTRUC PREMALIGNANT, FIRST LESION      Follow-up Disposition:  Return if symptoms worsen or fail to improve. I have discussed the diagnosis with the patient and the intended plan as seen in the above orders. Social history, medical history, and labs were reviewed. The patient has received an after-visit summary and questions were answered concerning future plans. I have discussed medication side effects and warnings with the patient as well.     Tonia Logan MD  Resident [facility] & [facility]  08/31/18    Patient discussed and seen with Dr. Osorio Hassan, Attending Physician

## 2018-08-31 NOTE — PROGRESS NOTES
Clinic Procedure Note:    Procedure performed: Cryotherapy of skin lesion     Indications: Actinic Keratosis    Date of procedure: 08/31/18    Chart reviewed for the following:              Marissa Curtis MD, have reviewed the History, Physical and updated the Allergic reactions for [address] performed immediately prior to start of procedure:              Marissa Curtis MD, have performed the following reviews on Ioana Jordan prior to the start of the procedure, see attached form in media. Procedure:  After consent was obtained, Skin tags were identified for destruction. Lesion(s) were treated to 3 rounds of cryotherapy, first with 5 seconds of treatment followed by 2 rounds of 10 seconds each. Total number of lesions treated:  1. Locations of Treatment performed: Left face and lateral to nose  The procedure was well tolerated. The patient is advised that typically noting swelling and mild discoloration of the lesions over the course of 24-48 hours initially and kim the lesions should eventually darken and fall off over the course of 7-14 days. A small amount of bleeding can be normal.    Advised if develops drainage, worse selling, new swelling after original swelling or other significant abnormalities to call or return to office immediatly. Procedure performed by:   Power Herrera MD  Provider assisted by: None    Patient assisted by: self     How tolerated by patient: tolerated the procedure well with no complications    Comments: none    Power Herrera MD  Resident [facility]

## 2018-10-01 ENCOUNTER — OFFICE VISIT (OUTPATIENT)
Dept: FAMILY MEDICINE CLINIC | Age: 83
End: 2018-10-01

## 2018-10-01 VITALS
HEIGHT: 67 IN | OXYGEN SATURATION: 98 % | BODY MASS INDEX: 19.46 KG/M2 | WEIGHT: 124 LBS | RESPIRATION RATE: 20 BRPM | DIASTOLIC BLOOD PRESSURE: 73 MMHG | SYSTOLIC BLOOD PRESSURE: 134 MMHG | TEMPERATURE: 97.1 F | HEART RATE: 66 BPM

## 2018-10-01 DIAGNOSIS — Z23 ENCOUNTER FOR IMMUNIZATION: ICD-10-CM

## 2018-10-01 DIAGNOSIS — M75.112 PARTIAL TEAR OF LEFT ROTATOR CUFF: Primary | ICD-10-CM

## 2018-10-01 RX ORDER — KETOROLAC TROMETHAMINE 30 MG/ML
15 INJECTION, SOLUTION INTRAMUSCULAR; INTRAVENOUS ONCE
Qty: 0.5 ML | Refills: 0
Start: 2018-10-01 | End: 2018-10-01

## 2018-10-01 NOTE — PROGRESS NOTES
CC: Shoulder pain HPI: Pt is a 80 y.o. male who presents for shoulder pain. He was picking up limbs in his yard 3 days ago and when he reached down he noticed a sharp pain and weakness in his left shoulder. Pain is in the upper shoulder and radiates down the arm. Pain is only present with movement and worse with abduction of the shoulder. He has not tried anything for the pain because he was not sure what to take. He has a h/o gastritis with certain medications. Of note, he lives alone and drove himself here today. He is not able to drive long distances because of the pain. Past Medical History:  
Diagnosis Date  BPH (benign prostatic hyperplasia) 5/12/2010  CAD (coronary artery disease)  Carotid artery disease ([facility] [address] 7/19/2011  Esophageal reflux 5/12/2010  Gastritis 5/12/2010  
 Hiatal hernia 5/12/2010  
 History of melanoma  IBS (irritable bowel syndrome)  Melanoma ([facility] 5/12/2010  Pure hypercholesterolemia 5/12/2010  S/P CABG (coronary artery bypass graft)  Tooth fracture 2/14 Family History Problem Relation Age of Onset  Heart Disease Mother  Heart Disease Father Social History Substance Use Topics  Smoking status: Never Smoker  Smokeless tobacco: Never Used  Alcohol use 0.0 oz/week  
  0 Standard drinks or equivalent per week Comment: Occasionally ROS: 
Positive only when bolded Constitutional: F/C, changes in weight Respiratory: SOB, wheezing, cough Cardiovascular: CP, palpitations Musculoskeletal: Myalgias, arthralgias PE: 
Visit Vitals  /73 (BP 1 Location: Right arm, BP Patient Position: Sitting)  Pulse 66  Temp 97.1 °F (36.2 °C) (Oral)  Resp 20  
 Ht 5' 7\" (1.702 m)  Wt 124 lb (56.2 kg)  SpO2 98%  BMI 19.42 kg/m2 Gen: Pt sitting in chair, in NAD Head: Normocephalic, atraumatic Eyes: Sclera anicteric, EOM grossly intact, PERRL Throat: MMM Neck: Supple CVS: Normal S1, S2, no m/r/g Resp: CTAB, no wheezes or rales MSK: No deformities or TTP of cervical spine, left scapula, clavicle, shoulder joints or upper arm. Active ROM limited to 85 degrees abduction but can get to 110 with passive ROM before pain. Weakness with abduction and resisted internal and external rotation. Negative drop arm sign. Extrem: Atraumatic, no cyanosis or edema Pulses: 2+ Skin: Warm, dry Neuro: Alert, oriented, appropriate A/P: Pt is a 80 y.o. male who presents for left shoulder pain, most c/w partial left rotator cuff tear. Advised referral to Ortho but pt states he has no transportation and I do not want him driving himself given the limitation of his ROM and presumed delayed response time at his age. - XR left shoulder, future. XR not available here until Friday, but pt will RTC 
- Toradol injection (15mg) given in clinic today given concern for oral NSAIDs with his h/o gastritis - Advised him to try Tylenol 500mg TID prn for pain and if that is not helping can do Aleve 250mg BID prn (pt states he has had this before without problems) - Rotator cuff exercises given 
- RTC prn if symptoms worsen or fail to improve. Will likely need referral to Ortho at that time Discussed diagnoses in detail with patient. Medication risks/benefits/side effects discussed with patient. All of the patient's questions were addressed. The patient understands and agrees with our plan of care. The patient knows to call back if they are unsure of or forget any changes we discussed today or if the symptoms change. The patient received an After-Visit Summary which contains VS, orders, medication list and allergy list. This can be used as a \"mini-medical record\" should they have to seek medical care while out of town. Current Outpatient Prescriptions on File Prior to Visit Medication Sig Dispense Refill  NITROSTAT 0.4 mg SL tablet PLACE 1 TABLET BY MOUTH UNDER TONGUE EVERY 5 MINUTES AS NEEDED 25 Tab 3  
 oxybutynin (DITROPAN) 5 mg tablet Take 1 Tab by mouth two (2) times a day. (Patient taking differently: Take 5 mg by mouth daily.) 60 Tab 5  
 silodosin (RAPAFLO) 8 mg capsule Take 1 Cap by mouth nightly. 90 Cap 2  
 atenolol (TENORMIN) 25 mg tablet TAKE 1/2 TABLET DAILY 45 Tab 3  
 colestipol (COLESTID) 1 gram tablet TAKE ONE TABLET BY MOUTH TWICE DAILY 60 Tab 11  
 food supplemt, lactose-reduced (BOOST HIGH PROTEIN) liqd Take 237 mL by mouth three (3) times daily.  LACTOBACILLUS RHAMNOSUS GG (PROBIOTIC PO) Take  by mouth two (2) times a day.  Cholecalciferol, Vitamin D3, (VITAMIN D) 1,000 unit Cap Take  by mouth daily.  aspirin delayed-release 81 mg tablet Take 81 mg by mouth daily.  cyanocobalamin (VITAMIN B-12) 1,000 mcg tablet Take 1,000 mcg by mouth daily. No current facility-administered medications on file prior to visit.

## 2018-10-01 NOTE — PROGRESS NOTES
1. Have you been to the ER, urgent care clinic since your last visit? Hospitalized since your last visit? no 
 
2. Have you seen or consulted any other health care providers outside of the [address] [place] [address] since your last visit? Include any pap smears or colon screening. no 
Reviewed record in preparation for visit and have obtained necessary documentation. Patient did not bring medications to visit for review. Information provided on Advanced Directive, Living Will. Body mass index is 19.42 kg/(m^2). Health Maintenance Due Topic Date Due  Shingrix Vaccine Age 50> (1 of 2) 08/15/1973  Influenza Age 5 to Adult  08/01/2018

## 2018-10-01 NOTE — MR AVS SNAPSHOT
[facility] 
 
 
 [address] [address] 
[phone] Patient: Gunnar Spatz MRN: [id number] :8/15/1923 Visit Information Date & Time Provider Department Dept. Phone Encounter #  
 10/1/2018 10:45 AM Ben Lopez  [facility] [phone] [id number] Follow-up Instructions Return in about 4 days (around 10/5/2018) for Xray only. Your Appointments 2019  8:00 AM  
ROUTINE CARE with Carmen Perez MD  
[address] [facility]) Appt Note: 6 MONTH F/U/Hypercholesterolemia,  
 [facility] [address]  
  
   
 [address] [address]  
  
    
 2019  1:00 PM  
ESTABLISHED PATIENT with Perlita Gamble MD  
[facility] ([facility]) Appt Note: 6 month follow up  
 [address] [facility] [place] [address] [facility]  
[address] [facility] [place] [address] [address] Upcoming Health Maintenance Date Due Shingrix Vaccine Age 50> (1 of 2) 8/15/1973 Influenza Age 5 to Adult 2018 GLAUCOMA SCREENING Q2Y 2018 MEDICARE YEARLY EXAM 2019 DTaP/Tdap/Td series (2 - Td) 2026 Allergies as of 10/1/2018  Review Complete On: 10/1/2018 By: Bandar Edmonds LPN No Known Allergies Current Immunizations  Reviewed on 2016 Name Date Influenza High Dose Vaccine PF 2017 Influenza Vaccine 10/7/2015, 10/14/2014, 2013 Influenza Vaccine (Quad) PF 2016 Influenza Vaccine (Tri) Adjuvanted  Incomplete Influenza Vaccine Split 10/19/2011, 10/19/2005 PPD 2009 Pneumococcal Conjugate (PCV-13) 2015 TD Vaccine 2010, 5/15/2006, 10/22/2002 Tdap 2016 ZZZ-RETIRED (DO NOT USE) Pneumococcal Vaccine (Unspecified Type) 2010, 2000 Not reviewed this visit You Were Diagnosed With   
  
 Codes Comments Partial tear of left rotator cuff    -  Primary ICD-10-CM: S16.825 ICD-9-CM: 840.4 Encounter for immunization     ICD-10-CM: R11 ICD-9-CM: V03.89 Vitals BP Pulse Temp Resp Height(growth percentile) Weight(growth percentile) 134/73 (BP 1 Location: Right arm, BP Patient Position: Sitting) 66 97.1 °F (36.2 °C) (Oral) 20 5' 7\" (1.702 m) 124 lb (56.2 kg) SpO2 BMI Smoking Status 98% 19.42 kg/m2 Never Smoker Vitals History BMI and BSA Data Body Mass Index Body Surface Area  
 19.42 kg/m 2 1.63 m 2 Preferred Pharmacy Pharmacy Name Phone [address] [place], [place] - [address] [phone] Your Updated Medication List  
  
   
This list is accurate as of 10/1/18 11:30 AM.  Always use your most recent med list.  
  
  
  
  
 aspirin delayed-release 81 mg tablet Take 81 mg by mouth daily. atenolol 25 mg tablet Commonly known as:  TENORMIN  
TAKE 1/2 TABLET DAILY  
  
 BOOST HIGH PROTEIN Liqd Generic drug:  food supplemt, lactose-reduced Take 237 mL by mouth three (3) times daily. colestipol 1 gram tablet Commonly known as:  COLESTID  
TAKE ONE TABLET BY MOUTH TWICE DAILY  
  
 ketorolac tromethamine 60 mg/2 mL Soln Commonly known as:  TORADOL  
0.5 mL by IntraMUSCular route once for 1 dose. NITROSTAT 0.4 mg SL tablet Generic drug:  nitroglycerin PLACE 1 TABLET BY MOUTH UNDER TONGUE EVERY 5 MINUTES AS NEEDED  
  
 oxybutynin 5 mg tablet Commonly known as:  BKRJQEGG Take 1 Tab by mouth two (2) times a day. PROBIOTIC PO Take  by mouth two (2) times a day. silodosin 8 mg capsule Commonly known as:  RAPAFLO Take 1 Cap by mouth nightly. VITAMIN B-12 1,000 mcg tablet Generic drug:  cyanocobalamin Take 1,000 mcg by mouth daily. VITAMIN D3 1,000 unit Cap Generic drug:  cholecalciferol Take  by mouth daily. We Performed the Following ADMIN INFLUENZA VIRUS VAC [ [facility]] INFLUENZA VACCINE INACTIVATED (IIV), SUBUNIT, ADJUVANTED, IM [id number] CPT(R)] KETOROLAC TROMETHAMINE INJ [ [facility]] OH THER/PROPH/DIAG INJECTION, SUBCUT/IM [id number] CPT(R)] Follow-up Instructions Return in about 4 days (around 10/5/2018) for Xray only. To-Do List   
 10/05/2018 Imaging:  XR SHOULDER LT AP/LAT MIN 2 V Patient Instructions 1. Try Tylenol for pain first. You can take 500mg three times a day as needed 2. We are giving you a shot of toradol today. That should last for 24 hours. Tomorrow, if you have still having pain and the Tylenol does not help, you can take Aleve 250mg, twice a day as needed. Rotator Cuff: Exercises Your Care Instructions Here are some examples of typical rehabilitation exercises for your condition. Start each exercise slowly. Ease off the exercise if you start to have pain. Your doctor or physical therapist will tell you when you can start these exercises and which ones will work best for you. How to do the exercises Pendulum swing If you have pain in your back, do not do this exercise. 1. Hold on to a table or the back of a chair with your good arm. Then bend forward a little and let your sore arm hang straight down. This exercise does not use the arm muscles. Rather, use your legs and your hips to create movement that makes your arm swing freely. 2. Use the movement from your hips and legs to guide the slightly swinging arm back and forth like a pendulum (or elephant trunk). Then guide it in circles that start small (about the size of a dinner plate). Make the circles a bit larger each day, as your pain allows. 3. Do this exercise for 5 minutes, 5 to 7 times each day. 4. As you have less pain, try bending over a little farther to do this exercise. This will increase the amount of movement at your shoulder. Posterior stretching exercise 1. Hold the elbow of your injured arm with your other hand. 2. Use your hand to pull your injured arm gently up and across your body. You will feel a gentle stretch across the back of your injured shoulder. 3. Hold for at least 15 to 30 seconds. Then slowly lower your arm. 4. Repeat 2 to 4 times. Up-the-back stretch Your doctor or physical therapist may want you to wait to do this stretch until you have regained most of your range of motion and strength. You can do this stretch in different ways. Hold any of these stretches for at least 15 to 30 seconds. Repeat them 2 to 4 times. 1. Put your hand in your back pocket. Let it rest there to stretch your shoulder. 2. With your other hand, hold your injured arm (palm outward) behind your back by the wrist. Pull your arm up gently to stretch your shoulder. 3. Next, put a towel over your other shoulder. Put the hand of your injured arm behind your back. Now hold the back end of the towel. With the other hand, hold the front end of the towel in front of your body. Pull gently on the front end of the towel. This will bring your hand farther up your back to stretch your shoulder. Overhead stretch 1. Standing about an arm's length away, grasp onto a solid surface. You could use a countertop, a doorknob, or the back of a sturdy chair. 2. With your knees slightly bent, bend forward with your arms straight. Lower your upper body, and let your shoulders stretch. 3. As your shoulders are able to stretch farther, you may need to take a step or two backward. 4. Hold for at least 15 to 30 seconds. Then stand up and relax. If you had stepped back during your stretch, step forward so you can keep your hands on the solid surface. 5. Repeat 2 to 4 times. Shoulder flexion (lying down) To make a wand for this exercise, use a piece of PVC pipe or a broom handle with the broom removed.  Make the wand about a foot wider than your shoulders. 1. Lie on your back, holding a wand with both hands. Your palms should face down as you hold the wand. 2. Keeping your elbows straight, slowly raise your arms over your head. Raise them until you feel a stretch in your shoulders, upper back, and chest. 
3. Hold for 15 to 30 seconds. 4. Repeat 2 to 4 times. Shoulder rotation (lying down) To make a wand for this exercise, use a piece of PVC pipe or a broom handle with the broom removed. Make the wand about a foot wider than your shoulders. 1. Lie on your back. Hold a wand with both hands with your elbows bent and palms up. 2. Keep your elbows close to your body, and move the wand across your body toward the sore arm. 3. Hold for 8 to 12 seconds. 4. Repeat 2 to 4 times. Wall climbing (to the side) Avoid any movement that is straight to your side, and be careful not to arch your back. Your arm should stay about 30 degrees to the front of your side. 1. Stand with your side to a wall so that your fingers can just touch it at an angle about 30 degrees toward the front of your body. 2. Walk the fingers of your injured arm up the wall as high as pain permits. Try not to shrug your shoulder up toward your ear as you move your arm up. 3. Hold that position for a count of at least 15 to 20. 
4. Walk your fingers back down to the starting position. 5. Repeat at least 2 to 4 times. Try to reach higher each time. Wall climbing (to the front) During this stretching exercise, be careful not to arch your back. 1. Face a wall, and stand so your fingers can just touch it. 2. Keeping your shoulder down, walk the fingers of your injured arm up the wall as high as pain permits. (Don't shrug your shoulder up toward your ear.) 3. Hold your arm in that position for at least 15 to 30 seconds. 4. Slowly walk your fingers back down to where you started. 5. Repeat at least 2 to 4 times. Try to reach higher each time. Shoulder blade squeeze 1. Stand with your arms at your sides, and squeeze your shoulder blades together. Do not raise your shoulders up as you squeeze. 2. Hold 6 seconds. 3. Repeat 8 to 12 times. Scapular exercise: Arm reach 1. Lie flat on your back. This exercise is a very slight motion that starts with your arms raised (elbows straight, arms straight). 2. From this position, reach higher toward the laura or ceiling. Keep your elbows straight. All motion should be from your shoulder blade only. 3. Relax your arms back to where you started. 4. Repeat 8 to 12 times. Arm raise to the side During this strengthening exercise, your arm should stay about 30 degrees to the front of your side. 1. Slowly raise your injured arm to the side, with your thumb facing up. Raise your arm 60 degrees at the most (shoulder level is 90 degrees). 2. Hold the position for 3 to 5 seconds. Then lower your arm back to your side. If you need to, bring your \"good\" arm across your body and place it under the elbow as you lower your injured arm. Use your good arm to keep your injured arm from dropping down too fast. 
3. Repeat 8 to 12 times. 4. When you first start out, don't hold any extra weight in your hand. As you get stronger, you may use a 1-pound to 2-pound dumbbell or a small can of food. Shoulder flexor and extensor exercise These are isometric exercises. That means you contract your muscles without actually moving. 1. Push forward (flex): Stand facing a wall or doorjamb, about 6 inches or less back. Hold your injured arm against your body. Make a closed fist with your thumb on top. Then gently push your hand forward into the wall with about 25% to 50% of your strength. Don't let your body move backward as you push. Hold for about 6 seconds. Relax for a few seconds. Repeat 8 to 12 times. 2. Push backward (extend): Stand with your back flat against a wall.  Your upper arm should be against the wall, with your elbow bent 90 degrees (your hand straight ahead). Push your elbow gently back against the wall with about 25% to 50% of your strength. Don't let your body move forward as you push. Hold for about 6 seconds. Relax for a few seconds. Repeat 8 to 12 times. Scapular exercise: Wall push-ups This exercise is best done with your fingers somewhat turned out, rather than straight up and down. 1. Stand facing a wall, about 12 inches to 18 inches away. 2. Place your hands on the wall at shoulder height. 3. Slowly bend your elbows and bring your face to the wall. Keep your back and hips straight. 4. Push back to where you started. 5. Repeat 8 to 12 times. 6. When you can do this exercise against a wall comfortably, you can try it against a counter. You can then slowly progress to the end of a couch, then to a sturdy chair, and finally to the floor. Scapular exercise: Retraction For this exercise, you will need elastic exercise material, such as surgical tubing or Thera-Band. 1. Put the band around a solid object at about waist level. (A bedpost will work well.) Each hand should hold an end of the band. 2. With your elbows at your sides and bent to 90 degrees, pull the band back. Your shoulder blades should move toward each other. Then move your arms back where you started. 3. Repeat 8 to 12 times. 4. If you have good range of motion in your shoulders, try this exercise with your arms lifted out to the sides. Keep your elbows at a 90-degree angle. Raise the elastic band up to about shoulder level. Pull the band back to move your shoulder blades toward each other. Then move your arms back where you started. Internal rotator strengthening exercise 1. Start by tying a piece of elastic exercise material to a doorknob. You can use surgical tubing or Thera-Band. 2. Stand or sit with your shoulder relaxed and your elbow bent 90 degrees. Your upper arm should rest comfortably against your side.  Squeeze a rolled towel between your elbow and your body for comfort. This will help keep your arm at your side. 3. Hold one end of the elastic band in the hand of the painful arm. 4. Slowly rotate your forearm toward your body until it touches your belly. Slowly move it back to where you started. 5. Keep your elbow and upper arm firmly tucked against the towel roll or at your side. 6. Repeat 8 to 12 times. External rotator strengthening exercise 1. Start by tying a piece of elastic exercise material to a doorknob. You can use surgical tubing or Thera-Band. (You may also hold one end of the band in each hand.) 2. Stand or sit with your shoulder relaxed and your elbow bent 90 degrees. Your upper arm should rest comfortably against your side. Squeeze a rolled towel between your elbow and your body for comfort. This will help keep your arm at your side. 3. Hold one end of the elastic band with the hand of the painful arm. 4. Start with your forearm across your belly. Slowly rotate the forearm out away from your body. Keep your elbow and upper arm tucked against the towel roll or the side of your body until you begin to feel tightness in your shoulder. Slowly move your arm back to where you started. 5. Repeat 8 to 12 times. Follow-up care is a key part of your treatment and safety. Be sure to make and go to all appointments, and call your doctor if you are having problems. It's also a good idea to know your test results and keep a list of the medicines you take. Where can you learn more? Go to http://vipin-josé miguel.info/. Enter Jenifer Massed in the search box to learn more about \"Rotator Cuff: Exercises. \" Current as of: November 29, 2017 Content Version: 11.7 © 4424-1599 [employer]. Care instructions adapted under license by [employer] (which disclaims liability or warranty for this information).  If you have questions about a medical condition or this instruction, always ask your healthcare professional. [place] [address] any warranty or liability for your use of this information. Introducing [facility]! [address] introduces [employer] patient portal. Now you can access parts of your medical record, email your doctor's office, and request medication refills online. 1. In your internet browser, go to [email or web address] 2. Click on the First Time User? Click Here link in the Sign In box. You will see the New Member Sign Up page. 3. Enter your [employer]o Access Code exactly as it appears below. You will not need to use this code after youve completed the sign-up process. If you do not sign up before the expiration date, you must request a new code. · [employer] Access Code: [email or web address] Expires: 10/22/2018 10:31 AM 
 
4. Enter the last four digits of your Social Security Number (xxxx) and Date of Birth (mm/dd/yyyy) as indicated and click Submit. You will be taken to the next sign-up page. 5. Create a F[employer]plingo ID. This will be your [employer]o login ID and cannot be changed, so think of one that is secure and easy to remember. 6. Create a [employer] password. You can change your password at any time. 7. Enter your Password Reset Question and Answer. This can be used at a later time if you forget your password. 8. Enter your e-mail address. You will receive e-mail notification when new information is available in [address] 9. Click Sign Up. You can now view and download portions of your medical record. 10. Click the Download Summary menu link to download a portable copy of your medical information. If you have questions, please visit the Frequently Asked Questions section of the [employer] website. Remember, [employer]plingo is NOT to be used for urgent needs. For medical emergencies, dial 911. Now available from your iPhone and Android! Please provide this summary of care documentation to your next provider. Your primary care clinician is listed as [address]. If you have any questions after today's visit, please call [phone].

## 2018-10-05 ENCOUNTER — TELEPHONE (OUTPATIENT)
Dept: FAMILY MEDICINE CLINIC | Age: 83
End: 2018-10-05

## 2018-10-05 NOTE — TELEPHONE ENCOUNTER
Called pt to advise of recent XR results. XR shows shoulder arthritis but no acute findings. Left message to call back.

## 2018-10-05 NOTE — TELEPHONE ENCOUNTER
Pt returned call. Conveyed results as above. He states the pain is improving and resolved when he takes Aleve. He will continue to do the exercises and call if the pain is not improved. All questions answered and pt feels comfortable with the plan of care.

## 2018-10-16 ENCOUNTER — OFFICE VISIT (OUTPATIENT)
Dept: FAMILY MEDICINE CLINIC | Age: 83
End: 2018-10-16

## 2018-10-16 VITALS
HEART RATE: 74 BPM | RESPIRATION RATE: 18 BRPM | OXYGEN SATURATION: 99 % | HEIGHT: 67 IN | DIASTOLIC BLOOD PRESSURE: 63 MMHG | BODY MASS INDEX: 20.25 KG/M2 | TEMPERATURE: 97.7 F | SYSTOLIC BLOOD PRESSURE: 137 MMHG | WEIGHT: 129 LBS

## 2018-10-16 DIAGNOSIS — M54.50 ACUTE MIDLINE LOW BACK PAIN WITHOUT SCIATICA: Primary | ICD-10-CM

## 2018-10-16 RX ORDER — CYCLOBENZAPRINE HCL 5 MG
5 TABLET ORAL
Qty: 10 TAB | Refills: 0 | Status: SHIPPED | OUTPATIENT
Start: 2018-10-16 | End: 2018-10-18

## 2018-10-16 NOTE — PATIENT INSTRUCTIONS

## 2018-10-16 NOTE — MR AVS SNAPSHOT
[address] [facility], [place] 
 
 
 [address] [address] 
[phone] Patient: Krystal Dugan MRN: [id number] :8/15/1923 Visit Information Date & Time Provider Department Dept. Phone Encounter #  
 10/16/2018 10:40 AM Abbe Patton MD 7 [facility] [id number] Follow-up Instructions Return if symptoms worsen or fail to improve. Your Appointments 2019  8:00 AM  
ROUTINE CARE with Oskar Dugan MD  
[facility]) Appt Note: 6 MONTH F/U/Hypercholesterolemia,  
 [address]  
  
   
 [address] [address]  
  
    
 2019  1:00 PM  
ESTABLISHED PATIENT with Morales Mena MD  
[facility] ([facility]) Appt Note: 6 month follow up  
 [address] [facility] [address] 100[address] [facility]  
54 [facility] [address] [address] Upcoming Health Maintenance Date Due Shingrix Vaccine Age 50> (1 of 2) 8/15/1973 GLAUCOMA SCREENING Q2Y 2018 MEDICARE YEARLY EXAM 2019 DTaP/Tdap/Td series (2 - Td) 2026 Allergies as of 10/16/2018  Review Complete On: 10/16/2018 By: Abbe Patton MD  
 No Known Allergies Current Immunizations  Reviewed on 2016 Name Date Influenza High Dose Vaccine PF 2017 Influenza Vaccine 10/7/2015, 10/14/2014, 2013 Influenza Vaccine (Quad) PF 2016 Influenza Vaccine (Tri) Adjuvanted 10/1/2018 Influenza Vaccine Split 10/19/2011, 10/19/2005 PPD 2009 Pneumococcal Conjugate (PCV-13) 2015 TD Vaccine 2010, 5/15/2006, 10/22/2002 Tdap 2016 ZZZ-RETIRED (DO NOT USE) Pneumococcal Vaccine (Unspecified Type) 2010, 2000 Not reviewed this visit You Were Diagnosed With   
  
 Codes Comments Acute midline low back pain without sciatica    -  Primary ICD-10-CM: M54.5 ICD-9-CM: 724.2 Vitals BP Pulse Temp Resp Height(growth percentile) Weight(growth percentile)  
 137/63 74 97.7 °F (36.5 °C) (Oral) 18 5' 7\" (1.702 m) 129 lb (58.5 kg) SpO2 BMI Smoking Status 99% 20.2 kg/m2 Never Smoker Vitals History BMI and BSA Data Body Mass Index Body Surface Area  
 20.2 kg/m 2 1.66 m 2 Preferred Pharmacy [facility] Name Phone [facility] [address] [facility] [phone] Your Updated Medication List  
  
   
This list is accurate as of 10/16/18 11:18 AM.  Always use your most recent med list.  
  
  
  
  
 aspirin delayed-release 81 mg tablet Take 81 mg by mouth daily. atenolol 25 mg tablet Commonly known as:  TENORMIN  
TAKE 1/2 TABLET DAILY  
  
 BOOST HIGH PROTEIN Liqd Generic drug:  food supplemt, lactose-reduced Take 237 mL by mouth three (3) times daily. colestipol 1 gram tablet Commonly known as:  COLESTID  
TAKE ONE TABLET BY MOUTH TWICE DAILY  
  
 cyclobenzaprine 5 mg tablet Commonly known as:  FLEXERIL Take 1 Tab by mouth two (2) times daily as needed for Muscle Spasm(s). NITROSTAT 0.4 mg SL tablet Generic drug:  nitroglycerin PLACE 1 TABLET BY MOUTH UNDER TONGUE EVERY 5 MINUTES AS NEEDED  
  
 oxybutynin 5 mg tablet Commonly known as:  JQRSKCAD Take 1 Tab by mouth two (2) times a day. PROBIOTIC PO Take  by mouth two (2) times a day. silodosin 8 mg capsule Commonly known as:  RAPAFLO Take 1 Cap by mouth nightly. VITAMIN B-12 1,000 mcg tablet Generic drug:  cyanocobalamin Take 1,000 mcg by mouth daily. VITAMIN D3 1,000 unit Cap Generic drug:  cholecalciferol Take  by mouth daily. Prescriptions Sent to Pharmacy  Refills  
 cyclobenzaprine (FLEXERIL) 5 mg tablet 0  
 Sig: Take 1 Tab by mouth two (2) times daily as needed for Muscle Spasm(s). Class: Normal  
 Pharmacy: 1000 [facility] #: [phone] Route: Oral  
  
Follow-up Instructions Return if symptoms worsen or fail to improve. Patient Instructions Low Back Pain: Exercises Your Care Instructions Here are some examples of typical rehabilitation exercises for your condition. Start each exercise slowly. Ease off the exercise if you start to have pain. Your doctor or physical therapist will tell you when you can start these exercises and which ones will work best for you. How to do the exercises Press-up 1. Lie on your stomach, supporting your body with your forearms. 2. Press your elbows down into the floor to raise your upper back. As you do this, relax your stomach muscles and allow your back to arch without using your back muscles. As your press up, do not let your hips or pelvis come off the floor. 3. Hold for 15 to 30 seconds, then relax. 4. Repeat 2 to 4 times. Alternate arm and leg (bird dog) exercise 1. Start on the floor, on your hands and knees. 2. Tighten your belly muscles. 3. Raise one leg off the floor, and hold it straight out behind you. Be careful not to let your hip drop down, because that will twist your trunk. 4. Hold for about 6 seconds, then lower your leg and switch to the other leg. 5. Repeat 8 to 12 times on each leg. 6. Over time, work up to holding for 10 to 30 seconds each time. 7. If you feel stable and secure with your leg raised, try raising the opposite arm straight out in front of you at the same time. Knee-to-chest exercise 1. Lie on your back with your knees bent and your feet flat on the floor. 2. Bring one knee to your chest, keeping the other foot flat on the floor (or keeping the other leg straight, whichever feels better on your lower back). 3. Keep your lower back pressed to the floor. Hold for at least 15 to 30 seconds. 4. Relax, and lower the knee to the starting position. 5. Repeat with the other leg. Repeat 2 to 4 times with each leg. 6. To get more stretch, put your other leg flat on the floor while pulling your knee to your chest. 
Curl-ups 1. Lie on the floor on your back with your knees bent at a 90-degree angle. Your feet should be flat on the floor, about 12 inches from your buttocks. 2. Cross your arms over your chest. If this bothers your neck, try putting your hands behind your neck (not your head), with your elbows spread apart. 3. Slowly tighten your belly muscles and raise your shoulder blades off the floor. 4. Keep your head in line with your body, and do not press your chin to your chest. 
5. Hold this position for 1 or 2 seconds, then slowly lower yourself back down to the floor. 6. Repeat 8 to 12 times. Pelvic tilt exercise 1. Lie on your back with your knees bent. 2. \"Brace\" your stomach. This means to tighten your muscles by pulling in and imagining your belly button moving toward your spine. You should feel like your back is pressing to the floor and your hips and pelvis are rocking back. 3. Hold for about 6 seconds while you breathe smoothly. 4. Repeat 8 to 12 times. Heel dig bridging 1. Lie on your back with both knees bent and your ankles bent so that only your heels are digging into the floor. Your knees should be bent about 90 degrees. 2. Then push your heels into the floor, squeeze your buttocks, and lift your hips off the floor until your shoulders, hips, and knees are all in a straight line. 3. Hold for about 6 seconds as you continue to breathe normally, and then slowly lower your hips back down to the floor and rest for up to 10 seconds. 4. Do 8 to 12 repetitions. Hamstring stretch in doorway 1. Lie on your back in a doorway, with one leg through the open door. 2. Slide your leg up the wall to straighten your knee. You should feel a gentle stretch down the back of your leg. 3. Hold the stretch for at least 15 to 30 seconds. Do not arch your back, point your toes, or bend either knee. Keep one heel touching the floor and the other heel touching the wall. 4. Repeat with your other leg. 5. Do 2 to 4 times for each leg. Hip flexor stretch 1. Kneel on the floor with one knee bent and one leg behind you. Place your forward knee over your foot. Keep your other knee touching the floor. 2. Slowly push your hips forward until you feel a stretch in the upper thigh of your rear leg. 3. Hold the stretch for at least 15 to 30 seconds. Repeat with your other leg. 4. Do 2 to 4 times on each side. Wall sit 1. Stand with your back 10 to 12 inches away from a wall. 2. Lean into the wall until your back is flat against it. 3. Slowly slide down until your knees are slightly bent, pressing your lower back into the wall. 4. Hold for about 6 seconds, then slide back up the wall. 5. Repeat 8 to 12 times. Follow-up care is a key part of your treatment and safety. Be sure to make and go to all appointments, and call your doctor if you are having problems. It's also a good idea to know your test results and keep a list of the medicines you take. Where can you learn more? Go to http://vipin-josé miguel.info/. Enter R125 in the search box to learn more about \"Low Back Pain: Exercises. \" Current as of: November 29, 2017 Content Version: 11.8 © 1920-3425 Healthwise, Incorporated. Care instructions adapted under license by [employer] (which disclaims liability or warranty for this information). If you have questions about a medical condition or this instruction, always ask your healthcare professional. [place] [address] any warranty or liability for your use of this information. Introducing [facility]! [facility] introduces In[employer] patient portal. Now you can access parts of your medical record, email your doctor's office, and request medication refills online. 1. In your internet browser, go to [email or web address] 2. Click on the First Time User? Click Here link in the Sign In box. You will see the New Member Sign Up page. 3. Enter your In[employer]ias Access Code exactly as it appears below. You will not need to use this code after youve completed the sign-up process. If you do not sign up before the expiration date, you must request a new code. · [employer] Access Code: [email or web address] Expires: 10/22/2018 10:31 AM 
 
4. Enter the last four digits of your Social Security Number (xxxx) and Date of Birth (mm/dd/yyyy) as indicated and click Submit. You will be taken to the next sign-up page. 5. Create a Invenias ID. This will be your Invenias login ID and cannot be changed, so think of one that is secure and easy to remember. 6. Create a In[employer]ias password. You can change your password at any time. 7. Enter your Password Reset Question and Answer. This can be used at a later time if you forget your password. 8. Enter your e-mail address. You will receive e-mail notification when new information is available in [address] 9. Click Sign Up. You can now view and download portions of your medical record. 10. Click the Download Summary menu link to download a portable copy of your medical information. If you have questions, please visit the Frequently Asked Questions section of the [employer] website. Remember, In[employer]ias is NOT to be used for urgent needs. For medical emergencies, dial 911. Now available from your iPhone and Android! Please provide this summary of care documentation to your next provider. Your primary care clinician is listed as [address]. If you have any questions after today's visit, please call [phone].

## 2018-10-16 NOTE — PROGRESS NOTES
1. Have you been to the ER, urgent care clinic, or been hospitalized since your last visit? No     2. Have you seen or consulted any other health care providers outside of the [address] [place] [address] since your last visit?   No     Reviewed record in preparation for visit and have necessary documentation  opportunity was given for questions  Goals that were addressed and/or need to be completed during or after this appointment include     Health Maintenance Due   Topic Date Due    Shingrix Vaccine Age 49> (1 of 2) 08/15/1973    GLAUCOMA SCREENING Q2Y  12/08/2018

## 2018-10-16 NOTE — PROGRESS NOTES
Victor Manuel Mortensen  80 y.o. male  8/15/1923  [address] [place] [id number]  [id number]     [facility] Practice: Progress Note       Encounter Date: 10/16/2018    Chief Complaint   Patient presents with    Back Pain     Aleve not helping the pain       History provided by patient (patient is hard of hearing which complicated exam)  History of Present Illness   Victor Manuel Mortensen is a 80 y.o. male who presents to clinic today for:    Back pain  Patient present with cc of back pain since last night. Patient reports that he woke last night with pain around 4 AM. Pain has been constant since then. Patient reports that he takes Aleve at night to help him sleep; he also took a second dose of Aleve at [facility]. Patient is unable to describe pain except in the middle of his back. Pain resolved during his drive into the office approx 10:30. Patient denies fall or trauma. Review of Systems   Review of Systems   Constitutional: Negative for chills and fever. Musculoskeletal: Positive for back pain and joint pain. Negative for falls. Neurological: Negative for dizziness and headaches. Vitals/Objective:     Vitals:    10/16/18 1048   BP: 137/63   Pulse: 74   Resp: 18   Temp: 97.7 °F (36.5 °C)   TempSrc: Oral   SpO2: 99%   Weight: 129 lb (58.5 kg)   Height: 5' 7\" (1.702 m)     Body mass index is 20.2 kg/m². Wt Readings from Last 3 Encounters:   10/16/18 129 lb (58.5 kg)   10/01/18 124 lb (56.2 kg)   08/30/18 126 lb 6.4 oz (57.3 kg)       Physical Exam   Constitutional: He appears well-developed. Cardiovascular: Normal rate and regular rhythm. Musculoskeletal: He exhibits no edema, tenderness or deformity. Thoracic back: Normal.        Lumbar back: He exhibits spasm. He exhibits normal range of motion, no tenderness, no bony tenderness, no swelling, no edema, no deformity and no pain. No results found for this or any previous visit (from the past 24 hour(s)).   Assessment and Plan: Encounter Diagnoses     ICD-10-CM ICD-9-CM   1. Acute midline low back pain without sciatica M54.5 724.2       1. Acute midline low back pain without sciatica  Discussed exercises and muscle relaxants at night. - cyclobenzaprine (FLEXERIL) 5 mg tablet; Take 1 Tab by mouth two (2) times daily as needed for Muscle Spasm(s). Dispense: 10 Tab; Refill: 0      I have discussed the diagnosis with the patient and the intended plan as seen in the above orders. he has expressed understanding. The patient has received an after-visit summary and questions were answered concerning future plans. I have discussed medication side effects and warnings with the patient as well. Electronically Signed: Courtney Poe MD     History/Allergies   Patients past medical, surgical and family histories were reviewed and updated. Past Medical History:   Diagnosis Date    BPH (benign prostatic hyperplasia) 5/12/2010    CAD (coronary artery disease)     Carotid artery disease ([facility] [address] 7/19/2011    Esophageal reflux 5/12/2010    Gastritis 5/12/2010    Hiatal hernia 5/12/2010    History of melanoma     IBS (irritable bowel syndrome)     Melanoma ([facility] [address] 5/12/2010    Pure hypercholesterolemia 5/12/2010    S/P CABG (coronary artery bypass graft)     Tooth fracture 2/14      Past Surgical History:   Procedure Laterality Date    ABDOMEN SURGERY PROC UNLISTED  1990    hernia, right inguinal    DUPLEX CAROTID BILATERAL  7/2011    Mild bilateral disease    ECHO 2D ADULT  2010    normal LV wall motion and ejection fraction, left atrial enlargement, mild aortic regurgitation, mild to moderate mitral regurgitation and mild tricuspid regurgitation. The ejection fraction was 55-60%.  HX CORONARY ARTERY BYPASS GRAFT  1995    LIMA to LAD, SVG to diagonal, SVG to trifurcation vessel and SVG to obtuse marginal.     HX HEART CATHETERIZATION  1995     Left ventricular wall motion is mild hypokinesis of the anterior wall.  Ejection Fraction is estimated at 55%. The Coronary Angiography revealed:. LAD 80% stenosis. OM1 80% stenosis. RCA >90% stenosis.  HX HEENT      melanoma surgery x2    HX OTHER SURGICAL      Treadmill stress test 7/26/12 - walked 6:43, no chest pain, (8.0 METS); stopped for SOB; no ischemic EKG changes, frequent PVC's including in couplets. Also one brief run of NSVT (7 beats) during stage 2.     HX OTHER SURGICAL      Carotid dopplers 7/12  show 10-49% stenosis bilat.  HX OTHER SURGICAL      Exercise cardiolite 8/10/12 - walked 7 min without chest pain; EKG with anteroseptal infarct age undetermined; no ischemic EKG changes; short runs (7 beat) of NSVT during exercise. Normal myocardial perfusion and function. LVEF 69%     HX OTHER SURGICAL      Carotid duplex 6/20/14 - mild 10-49% stenosis bilat     STRESS TEST - GXT  7/2011    WNL     Family History   Problem Relation Age of Onset    Heart Disease Mother     Heart Disease Father      Social History     Socioeconomic History    Marital status:      Spouse name: Not on file    Number of children: Not on file    Years of education: Not on file    Highest education level: Not on file   Social Needs    Financial resource strain: Not on file    Food insecurity - worry: Not on file    Food insecurity - inability: Not on file   [employer] needs - medical: Not on file   [employer] needs - non-medical: Not on file   Occupational History    Not on file   Tobacco Use    Smoking status: Never Smoker    Smokeless tobacco: Never Used   Substance and Sexual Activity    Alcohol use: Yes     Alcohol/week: 0.0 oz     Comment: Occasionally    Drug use: No    Sexual activity: Not on file   Other Topics Concern    Not on file   Social History Narrative    Not on file         No Known Allergies    Disposition     Follow-up Disposition:  Return if symptoms worsen or fail to improve.     Future Appointments   Date Time Provider Taisha Aguero 1/28/2019  8:00 AM Criss Vaughan MD [facility] SCHED   2/14/2019  1:00 PM Vianney Macias MD [address] [place] [address]            Current Medications after this visit     Current Outpatient Medications   Medication Sig    cyclobenzaprine (FLEXERIL) 5 mg tablet Take 1 Tab by mouth two (2) times daily as needed for Muscle Spasm(s).  NITROSTAT 0.4 mg SL tablet PLACE 1 TABLET BY MOUTH UNDER TONGUE EVERY 5 MINUTES AS NEEDED    oxybutynin (DITROPAN) 5 mg tablet Take 1 Tab by mouth two (2) times a day. (Patient taking differently: Take 5 mg by mouth daily.)    silodosin (RAPAFLO) 8 mg capsule Take 1 Cap by mouth nightly.  atenolol (TENORMIN) 25 mg tablet TAKE 1/2 TABLET DAILY    colestipol (COLESTID) 1 gram tablet TAKE ONE TABLET BY MOUTH TWICE DAILY    food supplemt, lactose-reduced (BOOST HIGH PROTEIN) liqd Take 237 mL by mouth three (3) times daily.  LACTOBACILLUS RHAMNOSUS GG (PROBIOTIC PO) Take  by mouth two (2) times a day.  Cholecalciferol, Vitamin D3, (VITAMIN D) 1,000 unit Cap Take  by mouth daily.  aspirin delayed-release 81 mg tablet Take 81 mg by mouth daily.  cyanocobalamin (VITAMIN B-12) 1,000 mcg tablet Take 1,000 mcg by mouth daily. No current facility-administered medications for this visit. There are no discontinued medications.

## 2018-10-18 ENCOUNTER — OFFICE VISIT (OUTPATIENT)
Dept: FAMILY MEDICINE CLINIC | Age: 83
End: 2018-10-18

## 2018-10-18 VITALS
BODY MASS INDEX: 20.25 KG/M2 | SYSTOLIC BLOOD PRESSURE: 112 MMHG | TEMPERATURE: 98 F | OXYGEN SATURATION: 96 % | WEIGHT: 129 LBS | HEIGHT: 67 IN | RESPIRATION RATE: 16 BRPM | DIASTOLIC BLOOD PRESSURE: 63 MMHG | HEART RATE: 72 BPM

## 2018-10-18 DIAGNOSIS — M54.50 ACUTE MIDLINE LOW BACK PAIN WITHOUT SCIATICA: Primary | ICD-10-CM

## 2018-10-18 RX ORDER — NAPROXEN 500 MG/1
500 TABLET ORAL 2 TIMES DAILY WITH MEALS
Qty: 28 TAB | Refills: 0 | Status: SHIPPED | OUTPATIENT
Start: 2018-10-18 | End: 2018-11-07

## 2018-10-18 RX ORDER — CYCLOBENZAPRINE HCL 5 MG
5 TABLET ORAL
Qty: 10 TAB | Refills: 0 | Status: SHIPPED | OUTPATIENT
Start: 2018-10-18 | End: 2018-10-22 | Stop reason: SDUPTHER

## 2018-10-18 NOTE — PROGRESS NOTES
[facility]     Outpatient Resident Progress Note    Encounter Date: 10/18/2018    Chief Complaint   Patient presents with    Back Pain       History of Present Illness    Patient is a 80 y.o. male, who presents to clinic for Back Pain  Pain is located on the mid-lower back with no radiation, weakness, or changes on sensation. He also denies loss of bowel or bladder control, leg weakness, changes in sensation, unsteady gait, or any other complains. Denies rash, fever, fatigue, dizziness, lightheadedness, headaches, changes in vision, cough, sore throat, CP, SOB, sputum production, abdominal pain or tenderness, nausea, vomiting, dysuria, hematuria, diarrhea, melena, hematochezia, leg swelling, or any other complains at this moment. Patient was seen at our clinic on 10/16/18 for the same complain. He was prescribed Flexeril 5 mg BID, patient states is not working for full day relieve of pain. Denies SE from Flexeril. Review of Systems    A complete ROS was reviewed and only pertinent items documented on HPI. Allergies - reviewed:   No Known Allergies    Medications - reviewed:   Current Outpatient Medications   Medication Sig    cyclobenzaprine (FLEXERIL) 5 mg tablet Take 1 Tab by mouth two (2) times daily as needed for Muscle Spasm(s).  naproxen (NAPROSYN) 500 mg tablet Take 1 Tab by mouth two (2) times daily (with meals).  NITROSTAT 0.4 mg SL tablet PLACE 1 TABLET BY MOUTH UNDER TONGUE EVERY 5 MINUTES AS NEEDED    oxybutynin (DITROPAN) 5 mg tablet Take 1 Tab by mouth two (2) times a day. (Patient taking differently: Take 5 mg by mouth daily.)    silodosin (RAPAFLO) 8 mg capsule Take 1 Cap by mouth nightly.     atenolol (TENORMIN) 25 mg tablet TAKE 1/2 TABLET DAILY    colestipol (COLESTID) 1 gram tablet TAKE ONE TABLET BY MOUTH TWICE DAILY    food supplemt, lactose-reduced (BOOST HIGH PROTEIN) liqd Take 237 mL by mouth three (3) times daily.    LACTOBACILLUS RHAMNOSUS GG (PROBIOTIC PO) Take  by mouth two (2) times a day.  Cholecalciferol, Vitamin D3, (VITAMIN D) 1,000 unit Cap Take  by mouth daily.  aspirin delayed-release 81 mg tablet Take 81 mg by mouth daily.  cyanocobalamin (VITAMIN B-12) 1,000 mcg tablet Take 1,000 mcg by mouth daily. No current facility-administered medications for this visit. Past Medical History - reviewed:  Past Medical History:   Diagnosis Date    BPH (benign prostatic hyperplasia) 5/12/2010    CAD (coronary artery disease)     Carotid artery disease ([facility] [address] [facility] 7/19/2011    Esophageal reflux 5/12/2010    Gastritis 5/12/2010    Hiatal hernia 5/12/2010    History of melanoma     IBS (irritable bowel syndrome)     Melanoma ([facility] 5/12/2010    Pure hypercholesterolemia 5/12/2010    S/P CABG (coronary artery bypass graft)     Tooth fracture 2/14       Family Medical History - reviewed:  Family History   Problem Relation Age of Onset    Heart Disease Mother     Heart Disease Father        Objective  Visit Vitals  /63 (BP 1 Location: Right arm, BP Patient Position: Sitting)   Pulse 72   Temp 98 °F (36.7 °C) (Oral)   Resp 16   Ht 5' 7\" (1.702 m)   Wt 129 lb (58.5 kg)   SpO2 96%   BMI 20.20 kg/m²     Body mass index is 20.2 kg/m². Nursing note and vitals reviewed. Physical Exam  Constitutional: Well-developed, well-nourished, and in no distress. Elderly. Cardiovascular: Normal rate, regular rhythm, normal heart sounds and intact distal pulses. Exam reveals no gallop and no friction rub. No murmur heard. Pulmonary/Chest: Effort normal and breath sounds normal. No respiratory distress. No wheezes, no rales, no tenderness. Musculoskeletal: Normal range of motion. Exhibits no edema, tenderness or deformity. Mild tenderness on the medial lumbar back. Pain on standing from sitting position, improved while standing. No pain on sitting. Neurological: Alert and oriented.  No focal abnormalities. Skin: Skin is warm and dry. No rash noted. Not diaphoretic. No erythema. No pallor. Assessment / Plan   Mr. Flores Oilvera is a 80 y.o. male with the following medical condition(s):    1. Acute midline low back pain without sciatica  Added Naproxen to treat inflammation and pain relieve. Recommended to continue Flexeril. - cyclobenzaprine (FLEXERIL) 5 mg tablet; Take 1 Tab by mouth two (2) times daily as needed for Muscle Spasm(s). Dispense: 10 Tab; Refill: 0  - naproxen (NAPROSYN) 500 mg tablet; Take 1 Tab by mouth two (2) times daily (with meals). Dispense: 28 Tab; Refill: 0    Patient was advised to return to clinic in case of worsening of symptoms or fail to improve. Follow-up Disposition:  Return if symptoms worsen or fail to improve. · I have discussed the diagnosis with the patient and the intended plan as seen in the above orders. The patient has received an after-visit summary and questions were answered concerning future plans. I have discussed medication side effects and warnings with the patient as well.       Patient/Plan discussed with Dr. Yamilex Leach (Attending Physician)      Benjamin Loco MD  PGY-3 Family Medicine Resident  Encounter Date: 10/18/2018

## 2018-10-18 NOTE — PROGRESS NOTES
Chief Complaint   Patient presents with    Back Pain     Body mass index is 20.2 kg/m². 1. Have you been to the ER, urgent care clinic since your last visit? Hospitalized since your last visit? No    2. Have you seen or consulted any other health care providers outside of the [facility] since your last visit? Include any pap smears or colon screening.  No    Reviewed record in preparation for visit and have necessary documentation  Pt did not bring medication to office visit for review  Information was given to pt on Advanced Directives, Living Will  Information was given on Shingles Vaccine  Opportunity was given for questions  Goals that were addressed and/or need to be completed after this appointment include:     Health Maintenance Due   Topic Date Due    Shingrix Vaccine Age 50> (1 of 2) 08/15/1973    GLAUCOMA SCREENING Q2Y  12/08/2018

## 2018-10-18 NOTE — PROGRESS NOTES
I discussed the findings, assessment and plan in detail with the resident and agree with the resident's findings and plan as documented in the resident's note. GFR is Normal. No Hx of GI bleeding. Fall risk so avoid narcotics. Sheryl Cornell M.D.

## 2018-10-18 NOTE — PATIENT INSTRUCTIONS
Back Pain: Care Instructions  Your Care Instructions    Back pain has many possible causes. It is often related to problems with muscles and ligaments of the back. It may also be related to problems with the nerves, discs, or bones of the back. Moving, lifting, standing, sitting, or sleeping in an awkward way can strain the back. Sometimes you don't notice the injury until later. Arthritis is another common cause of back pain. Although it may hurt a lot, back pain usually improves on its own within several weeks. Most people recover in 12 weeks or less. Using good home treatment and being careful not to stress your back can help you feel better sooner. Follow-up care is a key part of your treatment and safety. Be sure to make and go to all appointments, and call your doctor if you are having problems. It's also a good idea to know your test results and keep a list of the medicines you take. How can you care for yourself at home? · Sit or lie in positions that are most comfortable and reduce your pain. Try one of these positions when you lie down:  ? Lie on your back with your knees bent and supported by large pillows. ? Lie on the floor with your legs on the seat of a sofa or chair. ? Lie on your side with your knees and hips bent and a pillow between your legs. ? Lie on your stomach if it does not make pain worse. · Do not sit up in bed, and avoid soft couches and twisted positions. Bed rest can help relieve pain at first, but it delays healing. Avoid bed rest after the first day of back pain. · Change positions every 30 minutes. If you must sit for long periods of time, take breaks from sitting. Get up and walk around, or lie in a comfortable position. · Try using a heating pad on a low or medium setting for 15 to 20 minutes every 2 or 3 hours. Try a warm shower in place of one session with the heating pad. · You can also try an ice pack for 10 to 15 minutes every 2 to 3 hours.  Put a thin cloth between the ice pack and your skin. · Take pain medicines exactly as directed. ? If the doctor gave you a prescription medicine for pain, take it as prescribed. ? If you are not taking a prescription pain medicine, ask your doctor if you can take an over-the-counter medicine. · Take short walks several times a day. You can start with 5 to 10 minutes, 3 or 4 times a day, and work up to longer walks. Walk on level surfaces and avoid hills and stairs until your back is better. · Return to work and other activities as soon as you can. Continued rest without activity is usually not good for your back. · To prevent future back pain, do exercises to stretch and strengthen your back and stomach. Learn how to use good posture, safe lifting techniques, and proper body mechanics. When should you call for help? Call your doctor now or seek immediate medical care if:    · You have new or worsening numbness in your legs.     · You have new or worsening weakness in your legs. (This could make it hard to stand up.)     · You lose control of your bladder or bowels.    Watch closely for changes in your health, and be sure to contact your doctor if:    · You have a fever, lose weight, or don't feel well.     · You do not get better as expected. Where can you learn more? Go to http://vipin-josé miguel.info/. Enter N589 in the search box to learn more about \"Back Pain: Care Instructions. \"  Current as of: November 29, 2017  Content Version: 11.8  © 3043-8971 [employer]. Care instructions adapted under license by [employer] (which disclaims liability or warranty for this information). If you have questions about a medical condition or this instruction, always ask your healthcare professional. [place] [address] any warranty or liability for your use of this information.

## 2018-10-22 DIAGNOSIS — M54.50 ACUTE MIDLINE LOW BACK PAIN WITHOUT SCIATICA: ICD-10-CM

## 2018-10-23 RX ORDER — CYCLOBENZAPRINE HCL 5 MG
5 TABLET ORAL
Qty: 10 TAB | Refills: 0 | Status: SHIPPED | OUTPATIENT
Start: 2018-10-23 | End: 2018-11-20

## 2018-10-30 ENCOUNTER — OFFICE VISIT (OUTPATIENT)
Dept: FAMILY MEDICINE CLINIC | Age: 83
End: 2018-10-30

## 2018-10-30 VITALS
DIASTOLIC BLOOD PRESSURE: 77 MMHG | BODY MASS INDEX: 19.15 KG/M2 | SYSTOLIC BLOOD PRESSURE: 168 MMHG | RESPIRATION RATE: 16 BRPM | HEIGHT: 67 IN | TEMPERATURE: 97.9 F | WEIGHT: 122 LBS | HEART RATE: 72 BPM | OXYGEN SATURATION: 98 %

## 2018-10-30 DIAGNOSIS — M54.5 ACUTE MIDLINE LOW BACK PAIN, WITH SCIATICA PRESENCE UNSPECIFIED: Primary | ICD-10-CM

## 2018-10-30 RX ORDER — LIDOCAINE 50 MG/G
PATCH TOPICAL
Qty: 10 EACH | Refills: 0 | Status: SHIPPED | OUTPATIENT
Start: 2018-10-30 | End: 2019-02-14

## 2018-10-30 NOTE — PROGRESS NOTES
I saw and evaluated the patient, performing the key elements of the service. I discussed the findings, assessment and plan with the resident and agree with the resident's findings and plan as documented in the resident's note. No XR available today but pt advised to come back on Friday when XR available to have back imaged if he continues to have pain.

## 2018-10-30 NOTE — PATIENT INSTRUCTIONS
Please Drink Gatorade daily with 64oz of water for symptoms of diarrhea. Please take flexeril 5mg as needed Please come on Friday 11/2/2018 between 8-12. Learning About Relief for Back Pain What is back tension and strain? Back strain happens when you overstretch, or pull, a muscle in your back. You may hurt your back in an accident or when you exercise or lift something. Most back pain will get better with rest and time. You can take care of yourself at home to help your back heal. 
What can you do first to relieve back pain? When you first feel back pain, try these steps: 
· Walk. Take a short walk (10 to 20 minutes) on a level surface (no slopes, hills, or stairs) every 2 to 3 hours. Walk only distances you can manage without pain, especially leg pain. · Relax. Find a comfortable position for rest. Some people are comfortable on the floor or a medium-firm bed with a small pillow under their head and another under their knees. Some people prefer to lie on their side with a pillow between their knees. Don't stay in one position for too long. · Try heat or ice. Try using a heating pad on a low or medium setting, or take a warm shower, for 15 to 20 minutes every 2 to 3 hours. Or you can buy single-use heat wraps that last up to 8 hours. You can also try an ice pack for 10 to 15 minutes every 2 to 3 hours. You can use an ice pack or a bag of frozen vegetables wrapped in a thin towel. There is not strong evidence that either heat or ice will help, but you can try them to see if they help. You may also want to try switching between heat and cold. · Take pain medicine exactly as directed. ? If the doctor gave you a prescription medicine for pain, take it as prescribed. ? If you are not taking a prescription pain medicine, ask your doctor if you can take an over-the-counter medicine. What else can you do? · Stretch and exercise.  Exercises that increase flexibility may relieve your pain and make it easier for your muscles to keep your spine in a good, neutral position. And don't forget to keep walking. · Do self-massage. You can use self-massage to unwind after work or school or to energize yourself in the morning. You can easily massage your feet, hands, or neck. Self-massage works best if you are in comfortable clothes and are sitting or lying in a comfortable position. Use oil or lotion to massage bare skin. · Reduce stress. Back pain can lead to a vicious [place]: Distress about the pain tenses the muscles in your back, which in turn causes more pain. Learn how to relax your mind and your muscles to lower your stress. Where can you learn more? Go to http://vipin-josé miguel.info/. Enter K672 in the search box to learn more about \"Learning About Relief for Back Pain. \" Current as of: November 29, 2017 Content Version: 11.8 © 7655-7443 [employer]. Care instructions adapted under license by [employer] (which disclaims liability or warranty for this information). If you have questions about a medical condition or this instruction, always ask your healthcare professional. [address] any warranty or liability for your use of this information.

## 2018-10-30 NOTE — PROGRESS NOTES
[id number] [facility], [address] [place] [address] Affiliated with [facility] and [facility] Note Subjective:  
Blanac Butcher is a 80 y.o. male presents for evaluation of acute on chronic back pain CC:\" My back is hurting extremely bad, but now its better after taking flexeril\". History provided by patient HPI: 
 
Pt continues to have tlower back pain . Pt was seen on 10/18/2018 for back pain diagnosed with muscle spasms. Pt was discharged with naprosyn and flexeril. Pt takes the naprosyn with food. He states today he took flexeril today with improvement in symptoms. He was prescribed 10 tabs, but now has 5 tabs. Pt also complains of diarrhea starting at 1 am this morning. He states the diarrhea went all night long, with at least 5 bouts of diarrhea. He states the lower back pain increased as a result having to get up because of diarrhea. Pt did not eat any new foods. He states the diarrhea has resolved. Pt lives alone but son transports him to the office visit. Pain exacerbated by movement. Lower back pain Pt states since taking the flexeril at 1pm his back pain has improved. He only takes flexeril when he needs them. Pt went grocery shopping yesterday,but states the bags were not heavy. Denies any recent heavy lifting, recent falls. Denies bowel Current Outpatient Medications on File Prior to Visit Medication Sig Dispense Refill  cyclobenzaprine (FLEXERIL) 5 mg tablet Take 1 Tab by mouth two (2) times daily as needed for Muscle Spasm(s). 10 Tab 0  
 NITROSTAT 0.4 mg SL tablet PLACE 1 TABLET BY MOUTH UNDER TONGUE EVERY 5 MINUTES AS NEEDED 25 Tab 3  
 oxybutynin (DITROPAN) 5 mg tablet Take 1 Tab by mouth two (2) times a day. (Patient taking differently: Take 5 mg by mouth daily.) 60 Tab 5  
 silodosin (RAPAFLO) 8 mg capsule Take 1 Cap by mouth nightly.  90 Cap 2  
 atenolol (TENORMIN) 25 mg tablet TAKE 1/2 TABLET DAILY 45 Tab 3  
  colestipol (COLESTID) 1 gram tablet TAKE ONE TABLET BY MOUTH TWICE DAILY 60 Tab 11  
 food supplemt, lactose-reduced (BOOST HIGH PROTEIN) liqd Take 237 mL by mouth three (3) times daily.  LACTOBACILLUS RHAMNOSUS GG (PROBIOTIC PO) Take  by mouth two (2) times a day.  Cholecalciferol, Vitamin D3, (VITAMIN D) 1,000 unit Cap Take  by mouth daily.  aspirin delayed-release 81 mg tablet Take 81 mg by mouth daily.  cyanocobalamin (VITAMIN B-12) 1,000 mcg tablet Take 1,000 mcg by mouth daily.  naproxen (NAPROSYN) 500 mg tablet Take 1 Tab by mouth two (2) times daily (with meals). 28 Tab 0 No current facility-administered medications on file prior to visit. Past Medical History:  
Diagnosis Date  BPH (benign prostatic hyperplasia) 5/12/2010  CAD (coronary artery disease)  Carotid artery disease ([facility] 7/19/2011  Esophageal reflux 5/12/2010  Gastritis 5/12/2010  
 Hiatal hernia 5/12/2010  
 History of melanoma  IBS (irritable bowel syndrome)  Melanoma ([facility] 5/12/2010  Pure hypercholesterolemia 5/12/2010  S/P CABG (coronary artery bypass graft)  Tooth fracture 2/14 Social History Socioeconomic History  Marital status:  Spouse name: Not on file  Number of children: Not on file  Years of education: Not on file  Highest education level: Not on file Social Needs  Financial resource strain: Not on file  Food insecurity - worry: Not on file  Food insecurity - inability: Not on file  Transportation needs - medical: Not on file  Transportation needs - non-medical: Not on file Occupational History  Not on file Tobacco Use  Smoking status: Never Smoker  Smokeless tobacco: Never Used Substance and Sexual Activity  Alcohol use: Yes Alcohol/week: 0.0 oz  
  Comment: Occasionally  Drug use: No  
 Sexual activity: Not on file Other Topics Concern  Not on file Social History Narrative  Not on file Review of Systems Constitutional: Negative for chills and fever. Respiratory: Negative for cough and hemoptysis. Cardiovascular: Negative for chest pain. Gastrointestinal: Negative for abdominal pain, nausea and vomiting. Genitourinary: Negative for dysuria. Musculoskeletal: Positive for back pain. Negative for myalgias. Skin: Negative for itching and rash. Neurological: Negative for dizziness, tingling, sensory change, focal weakness and headaches. Objective:  
 
Visit Vitals /77 (BP 1 Location: Right arm, BP Patient Position: Sitting) Pulse 72 Temp 97.9 °F (36.6 °C) (Oral) Resp 16 Ht 5' 7\" (1.702 m) Wt 122 lb (55.3 kg) SpO2 98% BMI 19.11 kg/m² Physical Exam: 
Physical Exam  
Constitutional: He is oriented to person, place, and time. He appears well-developed and well-nourished. No distress. HENT:  
Head: Normocephalic and atraumatic. Eyes: Conjunctivae and EOM are normal. Pupils are equal, round, and reactive to light. No scleral icterus. Neck: Normal range of motion. Neck supple. Cardiovascular: Normal rate, regular rhythm, normal heart sounds and intact distal pulses. Exam reveals no gallop and no friction rub. No murmur heard. Pulmonary/Chest: Effort normal and breath sounds normal. No respiratory distress. He has no wheezes. He has no rales. He exhibits no tenderness. Abdominal: Soft. Bowel sounds are normal. There is no tenderness. Musculoskeletal: He exhibits no edema or tenderness. No palpable spasms. No point tenderness. No skin changes Neurological: He is alert and oriented to person, place, and time. No cranial nerve deficit. Coordination normal.  
Skin: Skin is warm and dry. No rash noted. He is not diaphoretic. No erythema. Psychiatric: He has a normal mood and affect. His behavior is normal. Judgment and thought content normal.  
Nursing note and vitals reviewed. Assessment and orders: ICD-10-CM ICD-9-CM 1. Acute midline low back pain, with sciatica presence unspecified M54.5 724.2 KETOROLAC TROMETHAMINE INJ  
   SD THER/PROPH/DIAG INJECTION, SUBCUT/IM  
   lidocaine (LIDODERM) 5 % CANCELED: XR SPINE LUMB MIN 4 V Diagnoses and all orders for this visit: 
 
1. Acute midline low back pain, with sciatica presence unspecified -     KETOROLAC TROMETHAMINE INJ 
-     SD THER/PROPH/DIAG INJECTION, SUBCUT/IM 
-     lidocaine (LIDODERM) 5 %; Apply patch to the affected area for 12 hours a day and remove for 12 hours a day. Follow-up Disposition: 
Return in about 2 weeks (around 11/13/2018) for back pain . I have reviewed patient medical and social history and medications. I have reviewed pertinent labs results and other data. I have discussed the diagnosis with the patient and the intended plan as seen in the above orders. The patient has received an after-visit summary and questions were answered concerning future plans. I have discussed medication side effects and warnings with the patient as well. Khang Kumar MD 
Resident [facility] 11/03/18

## 2018-10-30 NOTE — PROGRESS NOTES
1. Have you been to the ER, urgent care clinic since your last visit? Hospitalized since your last visit? no 
 
2. Have you seen or consulted any other health care providers outside of the [address] [place] [address] since your last visit? Include any pap smears or colon screening. no 
Reviewed record in preparation for visit and have obtained necessary documentation. Patient did not bring medications to visit for review. Information provided on Advanced Directive, Living Will. Body mass index is 19.11 kg/m². Health Maintenance Due Topic Date Due  Shingrix Vaccine Age 50> (1 of 2) 08/15/1973  GLAUCOMA SCREENING Q2Y  12/08/2018

## 2018-11-06 ENCOUNTER — TELEPHONE (OUTPATIENT)
Dept: FAMILY MEDICINE CLINIC | Age: 83
End: 2018-11-06

## 2018-11-06 NOTE — TELEPHONE ENCOUNTER
On 10-31-18, pt came into the office with no appt complaining of back pain. He had just been seen earlier in the week for the same issue. He stated the doctor gave him a pain medication injection and that helped but it wore off quickly and he wanted another one. I advised the patient that we did not have x ray until Friday and he said he was suppose to come in Friday for an xray but the pain is so severe he can't wait until then for another injection. Patient stated he wanted another injection. Patient advised that the physician would not give him another injection without an xray because we need to see what is causing the severe pain. I recommended to the patient that he goes to the ER so that he could get an xray today and that they would also be able to control his pain better than the office could. The office did not have narcotics that would be strong enough to control his pain. Patient asked if I would call the squad because he would not be able to ride sitting in the car to entire way to the ER. The squad was called and patient left on the squad. Dr. Logan Garcia agreed with the actions taken.

## 2018-11-09 DIAGNOSIS — M54.50 ACUTE MIDLINE LOW BACK PAIN WITHOUT SCIATICA: ICD-10-CM

## 2018-11-09 RX ORDER — NAPROXEN 500 MG/1
500 TABLET ORAL 2 TIMES DAILY WITH MEALS
Qty: 28 TAB | Refills: 0 | OUTPATIENT
Start: 2018-11-09

## 2018-11-09 RX ORDER — NAPROXEN 500 MG/1
500 TABLET ORAL 2 TIMES DAILY WITH MEALS
Qty: 28 TAB | Refills: 0 | Status: SHIPPED | OUTPATIENT
Start: 2018-11-09 | End: 2019-08-26 | Stop reason: ALTCHOICE

## 2018-11-20 ENCOUNTER — TELEPHONE (OUTPATIENT)
Dept: FAMILY MEDICINE CLINIC | Age: 83
End: 2018-11-20

## 2018-11-20 ENCOUNTER — OFFICE VISIT (OUTPATIENT)
Dept: FAMILY MEDICINE CLINIC | Age: 83
End: 2018-11-20

## 2018-11-20 VITALS
BODY MASS INDEX: 18.05 KG/M2 | HEIGHT: 67 IN | DIASTOLIC BLOOD PRESSURE: 53 MMHG | OXYGEN SATURATION: 98 % | HEART RATE: 81 BPM | SYSTOLIC BLOOD PRESSURE: 90 MMHG | WEIGHT: 115 LBS | RESPIRATION RATE: 16 BRPM

## 2018-11-20 DIAGNOSIS — R53.81 DEBILITY: ICD-10-CM

## 2018-11-20 DIAGNOSIS — G89.29 CHRONIC MIDLINE LOW BACK PAIN WITHOUT SCIATICA: Primary | ICD-10-CM

## 2018-11-20 DIAGNOSIS — R63.4 WEIGHT LOSS: ICD-10-CM

## 2018-11-20 DIAGNOSIS — M54.50 CHRONIC MIDLINE LOW BACK PAIN WITHOUT SCIATICA: Primary | ICD-10-CM

## 2018-11-20 DIAGNOSIS — M48.061 SPINAL STENOSIS OF LUMBAR REGION AT MULTIPLE LEVELS: ICD-10-CM

## 2018-11-20 RX ORDER — TRAMADOL HYDROCHLORIDE 50 MG/1
25 TABLET ORAL
Qty: 10 TAB | Refills: 1 | Status: SHIPPED | OUTPATIENT
Start: 2018-11-20 | End: 2019-02-14

## 2018-11-20 NOTE — PROGRESS NOTES
Chief Complaint Patient presents with  Back Pain Denies any pain at this time  Medication Refill Body mass index is 18.01 kg/m². 1. Have you been to the ER, urgent care clinic since your last visit? Hospitalized since your last visit? No 
 
2. Have you seen or consulted any other health care providers outside of the [address] [place] [address] since your last visit? Include any pap smears or colon screening. No 
 
Reviewed record in preparation for visit and have necessary documentation Pt did not bring medication to office visit for review Information was given to pt on Advanced Directives, Living Will Information was given on Shingles Vaccine Opportunity was given for questions Goals that were addressed and/or need to be completed after this appointment include:  
 
Health Maintenance Due Topic Date Due  Shingrix Vaccine Age 50> (1 of 2) 08/15/1973  GLAUCOMA SCREENING Q2Y  12/08/2018

## 2018-11-20 NOTE — PROGRESS NOTES
[address], [address] [facility] 
[phone]  
 
 
Progress Note Patient: Taqueria Resendez MRN: [id number]  SSN: [id number] YOB: 1923  Age: 80 y.o. Sex: male Chief Complaint Patient presents with  Back Pain Denies any pain at this time  Medication Refill Subjective:  
 
Encounter Diagnoses Name Primary? 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 His back pain started again. It is in the lower midline spine. when I palpate his back there is no muscle contraction but his lower spine is all fused together. I reviewed his CT scan report and he has significant spinal stenosis at L4-L5. Even though he cannot tell me that he has sciatica type symptoms his pain is probably neuropathic as well. Yes  Spinal stenosis of lumbar region at multiple levels: Worse at L4/L5. He may have an anterior plate compression fracture at L4. He cannot get out of the chair by himself. He needed assistance on both sides. He tells me that he is able to walk around in his house and hold onto things and is not fallen. He has a medic alert on that apparently calls  Jerrod who is his neighbor. He is not interested in nursing home placement for rehab in the [facility]. 
He took a tramadol tablet before he came to the office and he said it relieved his pain completely. It still hurt him to get up out of the chair.  Debility: He is lost 7 pounds since his office visit here. He is not eating regularly. He has no local family. He does not want to go to a nursing home. I am very concerned about his welfare. The only thing I can do is call home health and for home evaluation. I think you will also need some home physical therapy.   Should we need to contact his only son Elpidio's phone number is [phone]. He lives in [facility].  Weight loss; as above. I reminded him that he needed to either fix 3 meals a day and needed or get the meals brought to him. He cannot afford to lose anymore weight. Weight loss is closely associated with nutritional deficiency and elevated risk of severe infections. Current and past medical information: 
 
Current Medications after this visit[de-identified]    
Current Outpatient Medications Medication Sig  
 traMADol (ULTRAM) 50 mg tablet Take 0.5 Tabs by mouth every eight (8) hours as needed for Pain. Max Daily Amount: 75 mg.  
 naproxen (NAPROSYN) 500 mg tablet Take 1 Tab by mouth two (2) times daily (with meals).  lidocaine (LIDODERM) 5 % Apply patch to the affected area for 12 hours a day and remove for 12 hours a day.  NITROSTAT 0.4 mg SL tablet PLACE 1 TABLET BY MOUTH UNDER TONGUE EVERY 5 MINUTES AS NEEDED  
 oxybutynin (DITROPAN) 5 mg tablet Take 1 Tab by mouth two (2) times a day. (Patient taking differently: Take 5 mg by mouth daily.)  silodosin (RAPAFLO) 8 mg capsule Take 1 Cap by mouth nightly.  atenolol (TENORMIN) 25 mg tablet TAKE 1/2 TABLET DAILY  colestipol (COLESTID) 1 gram tablet TAKE ONE TABLET BY MOUTH TWICE DAILY  food supplemt, lactose-reduced (BOOST HIGH PROTEIN) liqd Take 237 mL by mouth three (3) times daily.  LACTOBACILLUS RHAMNOSUS GG (PROBIOTIC PO) Take  by mouth two (2) times a day.  Cholecalciferol, Vitamin D3, (VITAMIN D) 1,000 unit Cap Take  by mouth daily.  aspirin delayed-release 81 mg tablet Take 81 mg by mouth daily.  cyanocobalamin (VITAMIN B-12) 1,000 mcg tablet Take 1,000 mcg by mouth daily. No current facility-administered medications for this visit. Patient Active Problem List  
 Diagnosis Date Noted  PAC (premature atrial contraction) 07/29/2014  Chronic anemia 02/05/2014  GERD (gastroesophageal reflux disease) 05/17/2012  Carotid artery disease ([facility] [address] 07/19/2011  Anemia 09/15/2010  Pure hypercholesterolemia 05/12/2010  Esophageal reflux 05/12/2010  BPH (benign prostatic hyperplasia) 05/12/2010  Gastritis 05/12/2010  CAD (coronary artery disease) 05/12/2010  
 Hiatal hernia 05/12/2010 Past Medical History:  
Diagnosis Date  BPH (benign prostatic hyperplasia) 5/12/2010  CAD (coronary artery disease)  Carotid artery disease ([facility] [address] 7/19/2011  Esophageal reflux 5/12/2010  Gastritis 5/12/2010  
 Hiatal hernia 5/12/2010  
 History of melanoma  IBS (irritable bowel syndrome)  Melanoma ([facility] [address] 5/12/2010  Pure hypercholesterolemia 5/12/2010  S/P CABG (coronary artery bypass graft)  Tooth fracture 2/14 No Known Allergies Past Surgical History:  
Procedure Laterality Date  ABDOMEN SURGERY PROC UNLISTED  1990  
 hernia, right inguinal  
 DUPLEX CAROTID BILATERAL  7/2011 Mild bilateral disease  ECHO 2D ADULT  2010  
 normal LV wall motion and ejection fraction, left atrial enlargement, mild aortic regurgitation, mild to moderate mitral regurgitation and mild tricuspid regurgitation. The ejection fraction was 55-60%. [address] LIMA to LAD, SVG to diagonal, SVG to trifurcation vessel and SVG to obtuse marginal.   
 [address] Left ventricular wall motion is mild hypokinesis of the anterior wall. Ejection Fraction is estimated at 55%. The Coronary Angiography revealed:. LAD 80% stenosis. OM1 80% stenosis. RCA >90% stenosis.  HX HEENT    
 melanoma surgery x2  HX OTHER SURGICAL Treadmill stress test 7/26/12 - walked 6:43, no chest pain, (8.0 METS); stopped for SOB; no ischemic EKG changes, frequent PVC's including in couplets. Also one brief run of NSVT (7 beats) during stage 2.   
 HX OTHER SURGICAL Carotid dopplers 7/12  show 10-49% stenosis bilat.  HX OTHER SURGICAL Exercise cardiolite 8/10/12 - walked 7 min without chest pain; EKG with anteroseptal infarct age undetermined; no ischemic EKG changes; short runs (7 beat) of NSVT during exercise. Normal myocardial perfusion and function. LVEF 69%  HX OTHER SURGICAL Carotid duplex 6/20/14 - mild 10-49% stenosis bilat  STRESS TEST - GXT  7/2011 WNL Social History Socioeconomic History  Marital status:  Spouse name: Not on file  Number of children: Not on file  Years of education: Not on file  Highest education level: Not on file Tobacco Use  Smoking status: Never Smoker  Smokeless tobacco: Never Used Substance and Sexual Activity  Alcohol use: Yes Alcohol/week: 0.0 oz  
  Comment: Occasionally  Drug use: No  
 
 
Review of Systems Constitutional: Negative for chills, fever and malaise/fatigue. He is unable to walk down the angelo. He had to be brought to the exam room in a wheelchair. Wt Readings from Last 3 Encounters: 
11/20/18 : 115 lb (52.2 kg) 10/30/18 : 122 lb (55.3 kg) 10/18/18 : 129 lb (58.5 kg) HENT: Negative. Negative for hearing loss. Eyes: Negative. Negative for blurred vision and double vision. Respiratory: Negative. Negative for cough, hemoptysis, sputum production and shortness of breath. Cardiovascular: Negative. Negative for chest pain, palpitations, orthopnea and leg swelling. Gastrointestinal: Negative. Negative for abdominal pain, blood in stool, heartburn, nausea and vomiting. Genitourinary: Negative. Negative for dysuria, frequency and urgency. Musculoskeletal: Positive for joint pain. Negative for back pain, myalgias and neck pain. He denies any recent falls. His back pain first started 10/18/2018. CT scan from [facility] reviewed. Skin: Negative. Negative for rash. Neurological: Negative. Negative for dizziness, tingling, tremors, weakness and headaches. Endo/Heme/Allergies: Negative. Psychiatric/Behavioral: Negative. Negative for depression. Objective:  
 
Vitals:  
 11/20/18 1007 BP: 90/53 Pulse: 81 Resp: 16 SpO2: 98% Weight: 115 lb (52.2 kg) Height: 5' 7\" (1.702 m) Body mass index is 18.01 kg/m². Physical Exam  
Constitutional: He is oriented to person, place, and time and well-developed, well-nourished, and in no distress. HENT:  
Head: Normocephalic and atraumatic. Mouth/Throat: Oropharynx is clear and moist.  
Eyes: Right eye exhibits no discharge. Left eye exhibits no discharge. No scleral icterus. Neck: No thyromegaly present. No bruit. Cardiovascular: Normal rate, regular rhythm and normal heart sounds. Pulmonary/Chest: Effort normal and breath sounds normal.  
Abdominal: Soft. Musculoskeletal:  
     Back: 
 
Pain in lower spine. No muscle tenderness. Neurological: He is alert and oriented to person, place, and time. Skin: No rash noted. No erythema. Psychiatric: Mood and affect normal.  
Nursing note and vitals reviewed. Health Maintenance Due Topic Date Due  Shingrix Vaccine Age 50> (1 of 2) 08/15/1973  GLAUCOMA SCREENING Q2Y  12/08/2018 Assessment and orders:  
 
Encounter Diagnoses ICD-10-CM ICD-9-CM 1. Chronic midline low back pain without sciatica M54.5 724.2 G89.29 338.29  
2. Spinal stenosis of lumbar region at multiple levels M48.061 724.02  
3. Debility R53.81 799.3 4. Weight loss R63.4 783.21 Diagnoses and all orders for this visit: 
 
1. Chronic midline low back pain without sciatica-restart tramadol at a lower dose. Home health care and home physical therapy. -     traMADol (ULTRAM) 50 mg tablet; Take 0.5 Tabs by mouth every eight (8) hours as needed for Pain. Max Daily Amount: 75 mg. 
-     REFERRAL TO [address] 2. Spinal stenosis of lumbar region at multiple levels 
-     traMADol (ULTRAM) 50 mg tablet;  Take 0.5 Tabs by mouth every eight (8) hours as needed for Pain. Max Daily Amount: 75 mg. 
-     REFERRAL TO [facility] 3. Debility-encouraged him to eat and will ask home health care to assess his home situation. He is adamant about not going to a nursing home. 
-     CBC WITH AUTOMATED DIFF; Future -     METABOLIC PANEL, [address] [place] [address] 4. Weight loss-encouraged to eat 3 meals daily. 
-     CBC WITH AUTOMATED DIFF; Future -     METABOLIC PANEL, [address] [place] [address] Plan of care: 
Discussed diagnoses in detail with patient. Medication risks/benefits/side effects discussed with patient. All of the patient's questions were addressed. The patient understands and agrees with our plan of care. The patient knows to call back if they are unsure of or forget any changes we discussed today or if the symptoms change. The patient received an After-Visit Summary which contains VS, orders, medication list and allergy list. This can be used as a \"mini-medical record\" should they have to seek medical care while out of town. Patient Care Team: 
Megan Heredia MD as PCP - General 
Marina Dickey MD as Physician (Dermatology) Danish Singh MD as Physician (Gastroenterology) Chanelle Caputo MD (Cardiology) Lashonda Bolton MD (Urology) Follow-up Disposition: 
Return in about 2 weeks (around 12/4/2018). Future Appointments Date Time Provider Taisha Aguero 12/4/2018 10:30 AM Megan Heredia MD [facility] SCHED  
2/14/2019  1:00 PM Mohan Caputo MD [address] [place] [address] Signed By: Oskar Dugan MD   
 November 20, 2018 This note was created using voice recognition software. Despite editing, there may be syntax errors.

## 2018-11-20 NOTE — PATIENT INSTRUCTIONS
Back Pain: Care Instructions Your Care Instructions Back pain has many possible causes. It is often related to problems with muscles and ligaments of the back. It may also be related to problems with the nerves, discs, or bones of the back. Moving, lifting, standing, sitting, or sleeping in an awkward way can strain the back. Sometimes you don't notice the injury until later. Arthritis is another common cause of back pain. Although it may hurt a lot, back pain usually improves on its own within several weeks. Most people recover in 12 weeks or less. Using good home treatment and being careful not to stress your back can help you feel better sooner. Follow-up care is a key part of your treatment and safety. Be sure to make and go to all appointments, and call your doctor if you are having problems. It's also a good idea to know your test results and keep a list of the medicines you take. How can you care for yourself at home? · Sit or lie in positions that are most comfortable and reduce your pain. Try one of these positions when you lie down: ? Lie on your back with your knees bent and supported by large pillows. ? Lie on the floor with your legs on the seat of a sofa or chair. ? Lie on your side with your knees and hips bent and a pillow between your legs. ? Lie on your stomach if it does not make pain worse. · Do not sit up in bed, and avoid soft couches and twisted positions. Bed rest can help relieve pain at first, but it delays healing. Avoid bed rest after the first day of back pain. · Change positions every 30 minutes. If you must sit for long periods of time, take breaks from sitting. Get up and walk around, or lie in a comfortable position. · Try using a heating pad on a low or medium setting for 15 to 20 minutes every 2 or 3 hours. Try a warm shower in place of one session with the heating pad. · You can also try an ice pack for 10 to 15 minutes every 2 to 3 hours. Put a thin cloth between the ice pack and your skin. · Take pain medicines exactly as directed. ? If the doctor gave you a prescription medicine for pain, take it as prescribed. ? If you are not taking a prescription pain medicine, ask your doctor if you can take an over-the-counter medicine. · Take short walks several times a day. You can start with 5 to 10 minutes, 3 or 4 times a day, and work up to longer walks. Walk on level surfaces and avoid hills and stairs until your back is better. · Return to work and other activities as soon as you can. Continued rest without activity is usually not good for your back. · To prevent future back pain, do exercises to stretch and strengthen your back and stomach. Learn how to use good posture, safe lifting techniques, and proper body mechanics. When should you call for help? Call your doctor now or seek immediate medical care if: 
  · You have new or worsening numbness in your legs.  
  · You have new or worsening weakness in your legs. (This could make it hard to stand up.)  
  · You lose control of your bladder or bowels.  
 Watch closely for changes in your health, and be sure to contact your doctor if: 
  · You have a fever, lose weight, or don't feel well.  
  · You do not get better as expected. Where can you learn more? Go to http://vipin-josé miguel.info/. Enter S978 in the search box to learn more about \"Back Pain: Care Instructions. \" Current as of: November 29, 2017 Content Version: 11.8 © 8148-2359 [employer]. Care instructions adapted under license by [employer] (which disclaims liability or warranty for this information). If you have questions about a medical condition or this instruction, always ask your healthcare professional. [place] [address] any warranty or liability for your use of this information.

## 2018-11-21 ENCOUNTER — TELEPHONE (OUTPATIENT)
Dept: FAMILY MEDICINE CLINIC | Age: 83
End: 2018-11-21

## 2018-11-21 LAB
ALBUMIN SERPL-MCNC: 3.7 G/DL (ref 3.2–4.6)
ALBUMIN/GLOB SERPL: 1.1 {RATIO} (ref 1.2–2.2)
ALP SERPL-CCNC: 118 IU/L (ref 39–117)
ALT SERPL-CCNC: 15 IU/L (ref 0–44)
AST SERPL-CCNC: 23 IU/L (ref 0–40)
BASOPHILS # BLD AUTO: 0 X10E3/UL (ref 0–0.2)
BASOPHILS NFR BLD AUTO: 0 %
BILIRUB SERPL-MCNC: 0.4 MG/DL (ref 0–1.2)
BUN SERPL-MCNC: 32 MG/DL (ref 10–36)
BUN/CREAT SERPL: 33 (ref 10–24)
CALCIUM SERPL-MCNC: 9.1 MG/DL (ref 8.6–10.2)
CHLORIDE SERPL-SCNC: 103 MMOL/L (ref 96–106)
CO2 SERPL-SCNC: 24 MMOL/L (ref 20–29)
CREAT SERPL-MCNC: 0.98 MG/DL (ref 0.76–1.27)
EOSINOPHIL # BLD AUTO: 0.1 X10E3/UL (ref 0–0.4)
EOSINOPHIL NFR BLD AUTO: 1 %
ERYTHROCYTE [DISTWIDTH] IN BLOOD BY AUTOMATED COUNT: 14.7 % (ref 12.3–15.4)
GLOBULIN SER CALC-MCNC: 3.3 G/DL (ref 1.5–4.5)
GLUCOSE SERPL-MCNC: 100 MG/DL (ref 65–99)
HCT VFR BLD AUTO: 30.3 % (ref 37.5–51)
HGB BLD-MCNC: 10 G/DL (ref 13–17.7)
IMM GRANULOCYTES # BLD: 0 X10E3/UL (ref 0–0.1)
IMM GRANULOCYTES NFR BLD: 0 %
LYMPHOCYTES # BLD AUTO: 1.9 X10E3/UL (ref 0.7–3.1)
LYMPHOCYTES NFR BLD AUTO: 28 %
MCH RBC QN AUTO: 30.9 PG (ref 26.6–33)
MCHC RBC AUTO-ENTMCNC: 33 G/DL (ref 31.5–35.7)
MCV RBC AUTO: 94 FL (ref 79–97)
MONOCYTES # BLD AUTO: 0.8 X10E3/UL (ref 0.1–0.9)
MONOCYTES NFR BLD AUTO: 11 %
NEUTROPHILS # BLD AUTO: 4 X10E3/UL (ref 1.4–7)
NEUTROPHILS NFR BLD AUTO: 60 %
PLATELET # BLD AUTO: 309 X10E3/UL (ref 150–379)
POTASSIUM SERPL-SCNC: 4.8 MMOL/L (ref 3.5–5.2)
PROT SERPL-MCNC: 7 G/DL (ref 6–8.5)
RBC # BLD AUTO: 3.24 X10E6/UL (ref 4.14–5.8)
SODIUM SERPL-SCNC: 140 MMOL/L (ref 134–144)
WBC # BLD AUTO: 6.8 X10E3/UL (ref 3.4–10.8)

## 2018-11-21 NOTE — TELEPHONE ENCOUNTER
Gus Alston from [facility] called. They have attempted to see the patient. They tried to contacted the patient by phone without success and even went to the home. She will try to contact the son.    JIMENA

## 2018-11-27 ENCOUNTER — PATIENT OUTREACH (OUTPATIENT)
Dept: FAMILY MEDICINE CLINIC | Age: 83
End: 2018-11-27

## 2018-11-27 RX ORDER — ACETAMINOPHEN AND CODEINE PHOSPHATE 300; 30 MG/1; MG/1
1 TABLET ORAL
COMMUNITY
End: 2019-02-14

## 2018-11-27 NOTE — PROGRESS NOTES
ED Discharge Follow-Up Date/Time:     2018 3:57 PM 
 
Patient presented to [facility]  ED on 2018 and was diagnosed with wedge  Compression fracture of 1st lumbar vertebra. Top Challenges reviewed with the provider · Son is currently staying with patient · Will attend appt with you on 2018 · 2nd ED visit. Wedge compression fx of 1st lumbar vertebra. · Saw orhto . Placed in back brace and given Tylenol #3 Method of communication with provider :chart routing Nurse Navigator(NN) contacted the  family  by telephone to perform post ED discharge assessment. Verified name and  with family as identifiers. Provided introduction to self, and explanation of the Nurse Navigator role. Family reported assessment: \"He's doing a lot of sleeping\" Medication(s):  
New Medications at Discharge: none Changed Medications at Discharge: none Discontinued Medications at Discharge: none There were no barriers to obtaining medications identified at this time. Reviewed discharge instructions and red flags with  family who voiced understanding. Family given an opportunity to ask questions and does not have any further questions or concerns at this time. The family agrees to contact the PCP office for questions related to their healthcare. Patient reminded that there are physicians on call 24 hours a day / 7 days a week (M-F 5pm to 8am and from Friday 5pm until Monday 8am for the weekend) should the patient have questions or concerns. NN provided contact information for future reference. Offered follow up appointment with PCP: yes [facility] follow up appointment(s):  
Future Appointments Date Time Provider Taisha Aguero 2018 10:30 AM Caroline Orourke MD [facility] [place]  
2019  1:00 PM Annmarie Ramos MD [address] [facility] follow up appointment(s): n/a [facility]:  n/a  
 [email or web address] 9 AM- 9 PM.   Phone [phone] Goals  Supportive resources in place to maintain patient in the community (ie. Home Health, DME equipment, refer to, medication assistant plan, etc.)   
  11/27/2018: 
Patient's son is currently staying with patient while he is unable to take care of himself. They have not heard from [facility] yet, but son states he has several messages on his phone that he will check. He will let us know if it is not from them. Patient saw Ortho yesterday and was placed in a back brace and given tylenol #3.

## 2018-12-18 ENCOUNTER — OFFICE VISIT (OUTPATIENT)
Dept: FAMILY MEDICINE CLINIC | Age: 83
End: 2018-12-18

## 2018-12-18 VITALS
HEIGHT: 67 IN | OXYGEN SATURATION: 97 % | HEART RATE: 90 BPM | SYSTOLIC BLOOD PRESSURE: 133 MMHG | WEIGHT: 110 LBS | BODY MASS INDEX: 17.27 KG/M2 | RESPIRATION RATE: 16 BRPM | DIASTOLIC BLOOD PRESSURE: 69 MMHG

## 2018-12-18 DIAGNOSIS — M54.41 CHRONIC BILATERAL LOW BACK PAIN WITH BILATERAL SCIATICA: ICD-10-CM

## 2018-12-18 DIAGNOSIS — M48.56XA COMPRESSION FRACTURE OF LUMBAR SPINE, NON-TRAUMATIC, INITIAL ENCOUNTER (HCC): ICD-10-CM

## 2018-12-18 DIAGNOSIS — G89.29 CHRONIC BILATERAL LOW BACK PAIN WITH BILATERAL SCIATICA: ICD-10-CM

## 2018-12-18 DIAGNOSIS — R63.4 WEIGHT LOSS: Primary | ICD-10-CM

## 2018-12-18 DIAGNOSIS — M54.42 CHRONIC BILATERAL LOW BACK PAIN WITH BILATERAL SCIATICA: ICD-10-CM

## 2018-12-18 DIAGNOSIS — D64.9 CHRONIC ANEMIA: ICD-10-CM

## 2018-12-18 RX ORDER — SILODOSIN 8 MG/1
8 CAPSULE ORAL
Qty: 90 CAP | Refills: 2 | Status: SHIPPED | OUTPATIENT
Start: 2018-12-18 | End: 2019-02-27 | Stop reason: SDUPTHER

## 2018-12-18 RX ORDER — OXYBUTYNIN CHLORIDE 5 MG/1
5 TABLET ORAL 2 TIMES DAILY
Qty: 60 TAB | Refills: 5 | Status: SHIPPED | OUTPATIENT
Start: 2018-12-18

## 2018-12-18 NOTE — PATIENT INSTRUCTIONS
December 18, 2018  [place] [id number]      Dear Michelle Foreman:    Thank you for requesting access to Queue-it. Please follow the instructions below to view your test results, access and download parts of your medical record, view details of your past and upcoming appointments, and view your medications online. How Do I Sign Up? 1. In your internet browser, go to [email or web address]  2. Click on the First Time User? Click Here link in the Sign In box. You will see the New Member Sign Up page. 3. Enter your Queue-it Access Code exactly as it appears below. You will not need to use this code after youve completed the sign-up process. If you do not sign up before the expiration date, you must request a new code. · Queue-it Access Code: [email or web address]  · Expires: 1/28/2019 12:45 PM    4. Enter the last four digits of your Social Security Number (xxxx) and Date of Birth (mm/dd/yyyy) as indicated and click Submit. You will be taken to the next sign-up page. 5. Create a Queue-it ID. This will be your Queue-it login ID and cannot be changed, so think of one that is secure and easy to remember. 6. Create a Queue-it password. You can change your password at any time. 7. Enter your Password Reset Question and Answer. This can be used at a later time if you forget your password. 8. Enter your e-mail address. You will receive e-mail notification when new information is available in [address]. 9. Click Sign Up. You can now view and download portions of your medical record. 10. Click the Download Summary menu link to download a portable copy of your medical information. Additional Information:              If you require any assistance or have any questions, please contact our "[employer] [address] at [phone], email at [email or web address] or check online in our Frequently Asked Questions.     Remember, Queue-it is NOT to be used for urgent needs. For medical emergencies, dial 911. Now available from your iPhone and Android! Sincerely,   Susan King            Back Pain: Care Instructions  Your Care Instructions    Back pain has many possible causes. It is often related to problems with muscles and ligaments of the back. It may also be related to problems with the nerves, discs, or bones of the back. Moving, lifting, standing, sitting, or sleeping in an awkward way can strain the back. Sometimes you don't notice the injury until later. Arthritis is another common cause of back pain. Although it may hurt a lot, back pain usually improves on its own within several weeks. Most people recover in 12 weeks or less. Using good home treatment and being careful not to stress your back can help you feel better sooner. Follow-up care is a key part of your treatment and safety. Be sure to make and go to all appointments, and call your doctor if you are having problems. It's also a good idea to know your test results and keep a list of the medicines you take. How can you care for yourself at home? · Sit or lie in positions that are most comfortable and reduce your pain. Try one of these positions when you lie down:  ? Lie on your back with your knees bent and supported by large pillows. ? Lie on the floor with your legs on the seat of a sofa or chair. ? Lie on your side with your knees and hips bent and a pillow between your legs. ? Lie on your stomach if it does not make pain worse. · Do not sit up in bed, and avoid soft couches and twisted positions. Bed rest can help relieve pain at first, but it delays healing. Avoid bed rest after the first day of back pain. · Change positions every 30 minutes. If you must sit for long periods of time, take breaks from sitting. Get up and walk around, or lie in a comfortable position. · Try using a heating pad on a low or medium setting for 15 to 20 minutes every 2 or 3 hours.  Try a warm shower in place of one session with the heating pad. · You can also try an ice pack for 10 to 15 minutes every 2 to 3 hours. Put a thin cloth between the ice pack and your skin. · Take pain medicines exactly as directed. ? If the doctor gave you a prescription medicine for pain, take it as prescribed. ? If you are not taking a prescription pain medicine, ask your doctor if you can take an over-the-counter medicine. · Take short walks several times a day. You can start with 5 to 10 minutes, 3 or 4 times a day, and work up to longer walks. Walk on level surfaces and avoid hills and stairs until your back is better. · Return to work and other activities as soon as you can. Continued rest without activity is usually not good for your back. · To prevent future back pain, do exercises to stretch and strengthen your back and stomach. Learn how to use good posture, safe lifting techniques, and proper body mechanics. When should you call for help? Call your doctor now or seek immediate medical care if:    · You have new or worsening numbness in your legs.     · You have new or worsening weakness in your legs. (This could make it hard to stand up.)     · You lose control of your bladder or bowels.    Watch closely for changes in your health, and be sure to contact your doctor if:    · You have a fever, lose weight, or don't feel well.     · You do not get better as expected. Where can you learn more? Go to http://vipin-josé miguel.info/. Enter A133 in the search box to learn more about \"Back Pain: Care Instructions. \"  Current as of: November 29, 2017  Content Version: 11.8  © 1347-3989 [email or web address]. Care instructions adapted under license by [employer] (which disclaims liability or warranty for this information).  If you have questions about a medical condition or this instruction, always ask your healthcare professional. Gaet[employer] disclaims any warranty or liability for your use of this information. Back Pain: Care Instructions  Your Care Instructions    Back pain has many possible causes. It is often related to problems with muscles and ligaments of the back. It may also be related to problems with the nerves, discs, or bones of the back. Moving, lifting, standing, sitting, or sleeping in an awkward way can strain the back. Sometimes you don't notice the injury until later. Arthritis is another common cause of back pain. Although it may hurt a lot, back pain usually improves on its own within several weeks. Most people recover in 12 weeks or less. Using good home treatment and being careful not to stress your back can help you feel better sooner. Follow-up care is a key part of your treatment and safety. Be sure to make and go to all appointments, and call your doctor if you are having problems. It's also a good idea to know your test results and keep a list of the medicines you take. How can you care for yourself at home? · Sit or lie in positions that are most comfortable and reduce your pain. Try one of these positions when you lie down:  ? Lie on your back with your knees bent and supported by large pillows. ? Lie on the floor with your legs on the seat of a sofa or chair. ? Lie on your side with your knees and hips bent and a pillow between your legs. ? Lie on your stomach if it does not make pain worse. · Do not sit up in bed, and avoid soft couches and twisted positions. Bed rest can help relieve pain at first, but it delays healing. Avoid bed rest after the first day of back pain. · Change positions every 30 minutes. If you must sit for long periods of time, take breaks from sitting. Get up and walk around, or lie in a comfortable position. · Try using a heating pad on a low or medium setting for 15 to 20 minutes every 2 or 3 hours. Try a warm shower in place of one session with the heating pad.   · You can also try an ice pack for 10 to 15 minutes every 2 to 3 hours. Put a thin cloth between the ice pack and your skin. · Take pain medicines exactly as directed. ? If the doctor gave you a prescription medicine for pain, take it as prescribed. ? If you are not taking a prescription pain medicine, ask your doctor if you can take an over-the-counter medicine. · Take short walks several times a day. You can start with 5 to 10 minutes, 3 or 4 times a day, and work up to longer walks. Walk on level surfaces and avoid hills and stairs until your back is better. · Return to work and other activities as soon as you can. Continued rest without activity is usually not good for your back. · To prevent future back pain, do exercises to stretch and strengthen your back and stomach. Learn how to use good posture, safe lifting techniques, and proper body mechanics. When should you call for help? Call your doctor now or seek immediate medical care if:    · You have new or worsening numbness in your legs.     · You have new or worsening weakness in your legs. (This could make it hard to stand up.)     · You lose control of your bladder or bowels.    Watch closely for changes in your health, and be sure to contact your doctor if:    · You have a fever, lose weight, or don't feel well.     · You do not get better as expected. Where can you learn more? Go to http://vipin-josé miguel.info/. Enter K719 in the search box to learn more about \"Back Pain: Care Instructions. \"  Current as of: November 29, 2017  Content Version: 11.8  © 2455-3050 [employer]. Care instructions adapted under license by [employer] (which disclaims liability or warranty for this information). If you have questions about a medical condition or this instruction, always ask your healthcare professional. [address] any warranty or liability for your use of this information.

## 2018-12-18 NOTE — PROGRESS NOTES
Chief Complaint   Patient presents with    Weight Loss     Body mass index is 17.23 kg/m². 1. Have you been to the ER, urgent care clinic since your last visit? Hospitalized since your last visit? No    2. Have you seen or consulted any other health care providers outside of the [address] [place] [address] since your last visit? Include any pap smears or colon screening.  No    Reviewed record in preparation for visit and have necessary documentation  Pt did not bring medication to office visit for review  Information was given to pt on Advanced Directives, Living Will  Information was given on Shingles Vaccine  Opportunity was given for questions  Goals that were addressed and/or need to be completed after this appointment include:     Health Maintenance Due   Topic Date Due    Shingrix Vaccine Age 50> (1 of 2) 08/15/1973    GLAUCOMA SCREENING Q2Y  12/08/2018

## 2018-12-18 NOTE — PROGRESS NOTES
[address] [place] [address], [address] [facility]  [phone]           Progress Note    Patient: Bean Cooper MRN: [id number]  SSN: [id number]    YOB: 1923  Age: 80 y.o. Sex: male        Chief Complaint   Patient presents with    Weight Loss         Subjective:     Encounter Diagnoses   Name Primary?  Weight loss his son Bharati Wyman says That he just will not eat. I asked him in times presents to drink 3 ensures daily and any solid food he ate would then be a bonus. I do not want to have to use an appetite stimulant unless absolutely necessary but if weight loss continues I will consider cyproheptadine. Yes    Chronic bilateral low back pain with bilateral sciatica: Severe degenerative disc disease and probable compression fracture according to his son after his last ER visit. The original pain started after a fall from his recliner.  Compression fracture of lumbar spine, non-traumatic, initial encounter [facility]): He says is not in much pain unless he has to move.  Chronic anemia: His weight loss continues to have to worry about secondary causes. At his age anything can happen. No sx. Lab Results   Component Value Date/Time    HGB 10.0 (L) 11/20/2018 03:15 PM     Lab Results   Component Value Date/Time    Iron 57 04/12/2017 08:45 AM    TIBC 287 04/12/2017 08:45 AM    Iron % saturation 20 04/12/2017 08:45 AM    Ferritin 104 05/17/2013 08:07 AM                   Current and past medical information:    Current Medications after this visit[de-identified]     Current Outpatient Medications   Medication Sig    silodosin (RAPAFLO) 8 mg capsule Take 1 Cap by mouth nightly.  oxybutynin (DITROPAN) 5 mg tablet Take 1 Tab by mouth two (2) times a day.  acetaminophen-codeine (TYLENOL-CODEINE #3) 300-30 mg per tablet Take 1 Tab by mouth every four (4) hours as needed for Pain.     traMADol (ULTRAM) 50 mg tablet Take 0.5 Tabs by mouth every eight (8) hours as needed for Pain. Max Daily Amount: 75 mg.    naproxen (NAPROSYN) 500 mg tablet Take 1 Tab by mouth two (2) times daily (with meals).  lidocaine (LIDODERM) 5 % Apply patch to the affected area for 12 hours a day and remove for 12 hours a day.  NITROSTAT 0.4 mg SL tablet PLACE 1 TABLET BY MOUTH UNDER TONGUE EVERY 5 MINUTES AS NEEDED    atenolol (TENORMIN) 25 mg tablet TAKE 1/2 TABLET DAILY    colestipol (COLESTID) 1 gram tablet TAKE ONE TABLET BY MOUTH TWICE DAILY    food supplemt, lactose-reduced (BOOST HIGH PROTEIN) liqd Take 237 mL by mouth three (3) times daily.  LACTOBACILLUS RHAMNOSUS GG (PROBIOTIC PO) Take  by mouth two (2) times a day.  Cholecalciferol, Vitamin D3, (VITAMIN D) 1,000 unit Cap Take  by mouth daily.  aspirin delayed-release 81 mg tablet Take 81 mg by mouth daily.  cyanocobalamin (VITAMIN B-12) 1,000 mcg tablet Take 1,000 mcg by mouth daily. No current facility-administered medications for this visit.         Patient Active Problem List    Diagnosis Date Noted    PAC (premature atrial contraction) 07/29/2014    Chronic anemia 02/05/2014    GERD (gastroesophageal reflux disease) 05/17/2012    Carotid artery disease ([facility] 07/19/2011    Anemia 09/15/2010    Pure hypercholesterolemia 05/12/2010    Esophageal reflux 05/12/2010    BPH (benign prostatic hyperplasia) 05/12/2010    Gastritis 05/12/2010    CAD (coronary artery disease) 05/12/2010    Hiatal hernia 05/12/2010       Past Medical History:   Diagnosis Date    BPH (benign prostatic hyperplasia) 5/12/2010    CAD (coronary artery disease)     Carotid artery disease ([facility] 7/19/2011    Esophageal reflux 5/12/2010    Gastritis 5/12/2010    Hiatal hernia 5/12/2010    History of melanoma     IBS (irritable bowel syndrome)     Melanoma ([facility] 5/12/2010    Pure hypercholesterolemia 5/12/2010    S/P CABG (coronary artery bypass graft)     Tooth fracture 2/14       No Known Allergies    Past Surgical History:   Procedure Laterality Date    ABDOMEN SURGERY PROC UNLISTED  1990    hernia, right inguinal    DUPLEX CAROTID BILATERAL  7/2011    Mild bilateral disease    ECHO 2D ADULT  2010    normal LV wall motion and ejection fraction, left atrial enlargement, mild aortic regurgitation, mild to moderate mitral regurgitation and mild tricuspid regurgitation. The ejection fraction was 55-60%.  HX CORONARY ARTERY BYPASS GRAFT  1995    LIMA to LAD, SVG to diagonal, SVG to trifurcation vessel and SVG to obtuse marginal.     HX HEART CATHETERIZATION  1995     Left ventricular wall motion is mild hypokinesis of the anterior wall. Ejection Fraction is estimated at 55%. The Coronary Angiography revealed:. LAD 80% stenosis. OM1 80% stenosis. RCA >90% stenosis.  HX HEENT      melanoma surgery x2    HX OTHER SURGICAL      Treadmill stress test 7/26/12 - walked 6:43, no chest pain, (8.0 METS); stopped for SOB; no ischemic EKG changes, frequent PVC's including in couplets. Also one brief run of NSVT (7 beats) during stage 2.     HX OTHER SURGICAL      Carotid dopplers 7/12  show 10-49% stenosis bilat.  HX OTHER SURGICAL      Exercise cardiolite 8/10/12 - walked 7 min without chest pain; EKG with anteroseptal infarct age undetermined; no ischemic EKG changes; short runs (7 beat) of NSVT during exercise. Normal myocardial perfusion and function. LVEF 69%     HX OTHER SURGICAL      Carotid duplex 6/20/14 - mild 10-49% stenosis bilat     STRESS TEST - GXT  7/2011    WNL       Social History     Socioeconomic History    Marital status:      Spouse name: Not on file    Number of children: Not on file    Years of education: Not on file    Highest education level: Not on file   Tobacco Use    Smoking status: Never Smoker    Smokeless tobacco: Never Used   Substance and Sexual Activity    Alcohol use:  Yes     Alcohol/week: 0.0 oz     Comment: Occasionally    Drug use: No       Review of Systems Constitutional: Negative. Negative for chills, fever, malaise/fatigue and weight loss. HENT: Negative. Negative for hearing loss. Eyes: Negative. Respiratory: Negative. Cardiovascular: Negative. Negative for chest pain and palpitations. Gastrointestinal: Negative. Negative for abdominal pain, blood in stool, heartburn, nausea and vomiting. Genitourinary: Negative. Negative for dysuria, frequency and urgency. Musculoskeletal: Negative. Negative for back pain, myalgias and neck pain. Skin: Negative. Negative for rash. Neurological: Negative. Negative for dizziness, tingling, tremors, weakness and headaches. Endo/Heme/Allergies: Negative. Psychiatric/Behavioral: Negative. Negative for depression. Objective:     Vitals:    12/18/18 1413   BP: 133/69   Pulse: 90   Resp: 16   SpO2: 97%   Weight: 110 lb (49.9 kg)   Height: 5' 7\" (1.702 m)      Body mass index is 17.23 kg/m². Physical Exam   Constitutional: He is oriented to person, place, and time and well-developed, well-nourished, and in no distress. Wt Readings from Last 3 Encounters:  12/18/18 : 110 lb (49.9 kg)  11/20/18 : 115 lb (52.2 kg)  10/30/18 : 122 lb (55.3 kg)     HENT:   Head: Normocephalic and atraumatic. Mouth/Throat: Oropharynx is clear and moist.   Eyes: Right eye exhibits no discharge. Left eye exhibits no discharge. No scleral icterus. Neck: No thyromegaly present. No bruit. Cardiovascular: Normal rate, regular rhythm and normal heart sounds. Pulmonary/Chest: Effort normal and breath sounds normal. No respiratory distress. He has no wheezes. He has no rales. Abdominal: Soft. He exhibits no distension. There is no tenderness. There is no rebound and no guarding. Musculoskeletal: He exhibits tenderness. His lower back is always tender. Neurological: He is alert and oriented to person, place, and time. Skin: No rash noted. No erythema.    Psychiatric: Mood and affect normal.   Nursing note and vitals reviewed. Assessment and orders:     Encounter Diagnoses     ICD-10-CM ICD-9-CM   1. Weight loss R63.4 783.21   2. Chronic bilateral low back pain with bilateral sciatica M54.42 724.2    M54.41 724.3    G89.29 338.29   3. Compression fracture of lumbar spine, non-traumatic, [facility] M48.56XA 733.13   4. Chronic anemia D64.9 285.9     Diagnoses and all orders for this visit:    1. Weight loss-if drinking 3 boosts daily does not stabilize this consider cyproheptadine  -     TSH 3RD GENERATION  -     T4, FREE  -     CBC WITH AUTOMATED DIFF    2. Chronic bilateral low back pain with bilateral sciatica-very little pain as long as you good mood. Sleeps in the recliner. 3. Compression fracture of lumbar spine, non-traumatic, initial encounter ([facility])-stable neurologic symptoms    4. Chronic anemia-it is age this is most likely bone marrow failure  -     CBC WITH AUTOMATED DIFF    Refills  -     silodosin (RAPAFLO) 8 mg capsule; Take 1 Cap by mouth nightly. -     oxybutynin (DITROPAN) 5 mg tablet; Take 1 Tab by mouth two (2) times a day. Follow-up Disposition:  Return in about 4 weeks (around 1/15/2019). Plan of care:  Discussed diagnoses in detail with patient. Medication risks/benefits/side effects discussed with patient. All of the patient's questions were addressed. The patient understands and agrees with our plan of care. The patient knows to call back if they are unsure of or forget any changes we discussed today or if the symptoms change. The patient received an After-Visit Summary which contains VS, orders, medication list and allergy list. This can be used as a \"mini-medical record\" should they have to seek medical care while out of town.     Patient Care Team:  Pradeep Morales MD as PCP - General  Robert Drake MD as Physician (Dermatology)  Dereje Larson MD as Physician (Gastroenterology)  Hali Apple MD (Cardiology)  Logan Rudolph MD (Urology)  Little Gaucher, RN as Ambulatory Care Navigator    Follow-up Disposition:  Return in about 4 weeks (around 1/15/2019). Future Appointments   Date Time Provider Taisha More   1/15/2019 10:30 AM Bandar Marcelino MD [facility] QING SCHED   2/14/2019  1:00 PM Michelle Mcdermott MD [address] [place] [address]       Signed By: Mandy Godwin MD     December 18, 2018        This note was created using voice recognition software. Despite editing, there may be syntax errors.

## 2018-12-19 LAB
BASOPHILS # BLD AUTO: 0 X10E3/UL (ref 0–0.2)
BASOPHILS NFR BLD AUTO: 0 %
EOSINOPHIL # BLD AUTO: 0 X10E3/UL (ref 0–0.4)
EOSINOPHIL NFR BLD AUTO: 1 %
ERYTHROCYTE [DISTWIDTH] IN BLOOD BY AUTOMATED COUNT: 15.1 % (ref 12.3–15.4)
HCT VFR BLD AUTO: 33.1 % (ref 37.5–51)
HGB BLD-MCNC: 10.5 G/DL (ref 13–17.7)
IMM GRANULOCYTES # BLD: 0 X10E3/UL (ref 0–0.1)
IMM GRANULOCYTES NFR BLD: 0 %
LYMPHOCYTES # BLD AUTO: 1.7 X10E3/UL (ref 0.7–3.1)
LYMPHOCYTES NFR BLD AUTO: 39 %
MCH RBC QN AUTO: 30.1 PG (ref 26.6–33)
MCHC RBC AUTO-ENTMCNC: 31.7 G/DL (ref 31.5–35.7)
MCV RBC AUTO: 95 FL (ref 79–97)
MONOCYTES # BLD AUTO: 0.4 X10E3/UL (ref 0.1–0.9)
MONOCYTES NFR BLD AUTO: 8 %
NEUTROPHILS # BLD AUTO: 2.3 X10E3/UL (ref 1.4–7)
NEUTROPHILS NFR BLD AUTO: 52 %
PLATELET # BLD AUTO: 288 X10E3/UL (ref 150–379)
RBC # BLD AUTO: 3.49 X10E6/UL (ref 4.14–5.8)
T4 FREE SERPL-MCNC: 1.22 NG/DL (ref 0.82–1.77)
TSH SERPL DL<=0.005 MIU/L-ACNC: 1.92 UIU/ML (ref 0.45–4.5)
WBC # BLD AUTO: 4.5 X10E3/UL (ref 3.4–10.8)

## 2019-01-15 ENCOUNTER — OFFICE VISIT (OUTPATIENT)
Dept: FAMILY MEDICINE CLINIC | Age: 84
End: 2019-01-15

## 2019-01-15 VITALS
TEMPERATURE: 97.5 F | WEIGHT: 109 LBS | BODY MASS INDEX: 17.11 KG/M2 | HEART RATE: 78 BPM | SYSTOLIC BLOOD PRESSURE: 137 MMHG | DIASTOLIC BLOOD PRESSURE: 62 MMHG | RESPIRATION RATE: 20 BRPM | OXYGEN SATURATION: 97 % | HEIGHT: 67 IN

## 2019-01-15 DIAGNOSIS — D64.9 CHRONIC ANEMIA: ICD-10-CM

## 2019-01-15 DIAGNOSIS — L89.152 PRESSURE INJURY OF SACRAL REGION, STAGE 2 (HCC): Primary | ICD-10-CM

## 2019-01-15 DIAGNOSIS — R63.4 WEIGHT LOSS: ICD-10-CM

## 2019-01-15 DIAGNOSIS — I25.10 CORONARY ARTERY DISEASE INVOLVING NATIVE CORONARY ARTERY OF NATIVE HEART WITHOUT ANGINA PECTORIS: ICD-10-CM

## 2019-01-15 NOTE — PROGRESS NOTES
[address], [address] [facility] 
[phone]  
 
 
Progress Note Patient: Jb Matthew MRN: [id number]  SSN: [id number] YOB: 1923  Age: 80 y.o. Sex: male Chief Complaint Patient presents with  Weight Loss Subjective:  
 
Encounter Diagnoses Name Primary?  Pressure injury of sacral region, stage 2: Reportedly new. Apparently home health care is taking care of this and it is healing up as expected. Yes  Weight loss: He is only lost 1 pound since last visit. He says he feels good and has good energy. He is sleeping better. He is back in his bed. The hopes that having him in his bed will decrease the likelihood of forming additional bedsores-as opposed to sleeping in a recliner. Malu Peña Wt Readings from Last 3 Encounters:  
01/15/19 109 lb (49.4 kg) 12/18/18 110 lb (49.9 kg)  
11/20/18 115 lb (52.2 kg)  Chronic anemia: 
No sx. Lab Results Component Value Date/Time HGB 10.5 (L) 12/18/2018 04:52 PM  
 
Lab Results Component Value Date/Time Iron 57 04/12/2017 08:45 AM  
 TIBC 287 04/12/2017 08:45 AM  
 Iron % saturation 20 04/12/2017 08:45 AM  
 Ferritin 104 05/17/2013 08:07 AM  
 
 
 
   
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 
No chest pain, MATA or SOB. No PND or orthopnea. Current and past medical information: 
 
Current Medications after this visit[de-identified]    
Current Outpatient Medications Medication Sig  
 silodosin (RAPAFLO) 8 mg capsule Take 1 Cap by mouth nightly.  oxybutynin (DITROPAN) 5 mg tablet Take 1 Tab by mouth two (2) times a day.  acetaminophen-codeine (TYLENOL-CODEINE #3) 300-30 mg per tablet Take 1 Tab by mouth every four (4) hours as needed for Pain.  lidocaine (LIDODERM) 5 % Apply patch to the affected area for 12 hours a day and remove for 12 hours a day.  NITROSTAT 0.4 mg SL tablet PLACE 1 TABLET BY MOUTH UNDER TONGUE EVERY 5 MINUTES AS NEEDED  
 atenolol (TENORMIN) 25 mg tablet TAKE 1/2 TABLET DAILY  colestipol (COLESTID) 1 gram tablet TAKE ONE TABLET BY MOUTH TWICE DAILY  food supplemt, lactose-reduced (BOOST HIGH PROTEIN) liqd Take 237 mL by mouth three (3) times daily.  LACTOBACILLUS RHAMNOSUS GG (PROBIOTIC PO) Take  by mouth two (2) times a day.  Cholecalciferol, Vitamin D3, (VITAMIN D) 1,000 unit Cap Take  by mouth daily.  aspirin delayed-release 81 mg tablet Take 81 mg by mouth daily.  cyanocobalamin (VITAMIN B-12) 1,000 mcg tablet Take 1,000 mcg by mouth daily.  traMADol (ULTRAM) 50 mg tablet Take 0.5 Tabs by mouth every eight (8) hours as needed for Pain. Max Daily Amount: 75 mg.  
 naproxen (NAPROSYN) 500 mg tablet Take 1 Tab by mouth two (2) times daily (with meals). No current facility-administered medications for this visit. Patient Active Problem List  
 Diagnosis Date Noted  PAC (premature atrial contraction) 07/29/2014  Chronic anemia 02/05/2014  GERD (gastroesophageal reflux disease) 05/17/2012  Carotid artery disease ([facility] [address] 07/19/2011  Anemia 09/15/2010  Pure hypercholesterolemia 05/12/2010  Esophageal reflux 05/12/2010  BPH (benign prostatic hyperplasia) 05/12/2010  Gastritis 05/12/2010  CAD (coronary artery disease) 05/12/2010  
 Hiatal hernia 05/12/2010 Past Medical History:  
Diagnosis Date  BPH (benign prostatic hyperplasia) 5/12/2010  CAD (coronary artery disease)  Carotid artery disease ([facility] [address] 7/19/2011  Esophageal reflux 5/12/2010  Gastritis 5/12/2010  
 Hiatal hernia 5/12/2010  
 History of melanoma  IBS (irritable bowel syndrome)  Melanoma ([facility] [address] 5/12/2010  Pure hypercholesterolemia 5/12/2010  S/P CABG (coronary artery bypass graft)  Tooth fracture 2/14 No Known Allergies Past Surgical History:  
Procedure Laterality Date  ABDOMEN SURGERY PROC UNLISTED  1990  
 hernia, right inguinal  
 DUPLEX CAROTID BILATERAL  7/2011 Mild bilateral disease  ECHO 2D ADULT  2010  
 normal LV wall motion and ejection fraction, left atrial enlargement, mild aortic regurgitation, mild to moderate mitral regurgitation and mild tricuspid regurgitation. The ejection fraction was 55-60%. Vickey Forno 76 LIMA to LAD, SVG to diagonal, SVG to trifurcation vessel and SVG to obtuse marginal.   
 [address] Left ventricular wall motion is mild hypokinesis of the anterior wall. Ejection Fraction is estimated at 55%. The Coronary Angiography revealed:. LAD 80% stenosis. OM1 80% stenosis. RCA >90% stenosis.  HX HEENT    
 melanoma surgery x2  HX OTHER SURGICAL Treadmill stress test 7/26/12 - walked 6:43, no chest pain, (8.0 METS); stopped for SOB; no ischemic EKG changes, frequent PVC's including in couplets. Also one brief run of NSVT (7 beats) during stage 2.   
 HX OTHER SURGICAL Carotid dopplers 7/12  show 10-49% stenosis bilat.  HX OTHER SURGICAL Exercise cardiolite 8/10/12 - walked 7 min without chest pain; EKG with anteroseptal infarct age undetermined; no ischemic EKG changes; short runs (7 beat) of NSVT during exercise. Normal myocardial perfusion and function. LVEF 69%  HX OTHER SURGICAL Carotid duplex 6/20/14 - mild 10-49% stenosis bilat  STRESS TEST - GXT  7/2011 WNL Social History Socioeconomic History  Marital status:  Spouse name: Not on file  Number of children: Not on file  Years of education: Not on file  Highest education level: Not on file Tobacco Use  Smoking status: Never Smoker  Smokeless tobacco: Never Used Substance and Sexual Activity  Alcohol use: Yes Alcohol/week: 0.0 oz  
  Comment: Occasionally  Drug use: No  
 
 
Review of Systems Constitutional: Positive for weight loss. Negative for chills, fever and malaise/fatigue. His weakness has improved since last office visit. He can actually get himself to a standing position. His son says he is moving around in the house better as well. His son will have to leave sometime in late February or March. He is in the process of finding a private duty nighttime sitter. HENT: Negative. Negative for hearing loss. Eyes: Negative. Negative for blurred vision and double vision. Respiratory: Negative. Negative for cough, hemoptysis, sputum production and shortness of breath. Cardiovascular: Negative. Negative for chest pain, palpitations, orthopnea and leg swelling. Gastrointestinal: Negative. Negative for abdominal pain, blood in stool, heartburn, nausea and vomiting. Genitourinary: Negative. Musculoskeletal: Negative. Negative for back pain, myalgias and neck pain. His back pain is actually resolved since last visit. He is not in any further falls. Skin: Negative. Negative for rash. Neurological: Positive for weakness. Negative for dizziness, tingling, tremors and headaches. Endo/Heme/Allergies: Negative. Psychiatric/Behavioral: Negative. Negative for depression. Objective:  
 
Vitals:  
 01/15/19 1040 BP: 137/62 Pulse: 78 Resp: 20 Temp: 97.5 °F (36.4 °C) TempSrc: Oral  
SpO2: 97% Weight: 109 lb (49.4 kg) Height: 5' 7\" (1.702 m) Body mass index is 17.07 kg/m². Physical Exam  
Constitutional: He is oriented to person, place, and time and well-developed, well-nourished, and in no distress. He is alert oriented and answers all questions appropriately. HENT:  
Head: Normocephalic and atraumatic. Mouth/Throat: Oropharynx is clear and moist.  
Eyes: Right eye exhibits no discharge. Left eye exhibits no discharge. No scleral icterus. Neck: No thyromegaly present. No bruit. Cardiovascular: Normal rate, regular rhythm and normal heart sounds. Pulmonary/Chest: Effort normal and breath sounds normal.  
Abdominal: Soft. He exhibits no distension. There is no tenderness. Neurological: He is alert and oriented to person, place, and time. Skin: No rash noted. No erythema. Psychiatric: Mood and affect normal.  
Nursing note and vitals reviewed. There are no preventive care reminders to display for this patient. Assessment and orders:  
 
Encounter Diagnoses ICD-10-CM ICD-9-CM 1. Pressure injury of sacral region, stage 2 L89.152 707.03  
  707.22  
2. Weight loss R63.4 783.21  
3. Chronic anemia D64.9 285.9 4. Coronary artery disease involving native coronary artery of native heart without angina pectoris I25.10 414.01 Diagnoses and all orders for this visit: 1. Pressure injury of sacral region, stage 2-he is getting paste applied daily. 2. Weight loss-he has lost only 1 pound which I considered to be stable. 3. Chronic anemia-no symptoms related to this 4. Coronary artery disease involving native coronary artery of native heart without angina pectoris-no CAD symptoms Plan of care: 
Discussed diagnoses in detail with patient. Medication risks/benefits/side effects discussed with patient. All of the patient's questions were addressed. The patient understands and agrees with our plan of care. The patient knows to call back if they are unsure of or forget any changes we discussed today or if the symptoms change. The patient received an After-Visit Summary which contains VS, orders, medication list and allergy list. This can be used as a \"mini-medical record\" should they have to seek medical care while out of town. Patient Care Team: 
Jamarcus Franklin MD as PCP - General 
Kuldip Lambert MD as Physician (Dermatology) Mattie Leyden, MD as Physician (Gastroenterology) Francy Brar MD (Cardiology) Braeden Haskins MD (Urology) Alexandr Rosa, RN as Ambulatory Care Navigator Follow-up Disposition: 
Return in about 4 weeks (around 2/12/2019). Future Appointments Date Time Provider Taisha More 2/14/2019  1:00 PM Wicho Tamez MD [address] [place] [address] Signed By: Rand Galvez MD   
 January 15, 2019 This note was created using voice recognition software. Despite editing, there may be syntax errors.

## 2019-01-15 NOTE — PROGRESS NOTES
Body mass index is 17.07 kg/m². 1. Have you been to the ER, urgent care clinic since your last visit? Hospitalized since your last visit? No 
 
2. Have you seen or consulted any other health care providers outside of the [address] [place] [address] since your last visit? Include any pap smears or colon screening. No 
 
Reviewed record in preparation for visit and have necessary documentation Pt did not bring medication to office visit for review Information was given to pt on Advanced Directives, Living Will Information was given on Shingles Vaccine Opportunity was given for questions Goals that were addressed and/or need to be completed after this appointment include: There are no preventive care reminders to display for this patient.

## 2019-01-17 DIAGNOSIS — I25.10 CORONARY ARTERY DISEASE INVOLVING NATIVE CORONARY ARTERY OF NATIVE HEART WITHOUT ANGINA PECTORIS: Chronic | ICD-10-CM

## 2019-01-17 RX ORDER — ATENOLOL 25 MG/1
TABLET ORAL
Qty: 45 TAB | Refills: 3 | Status: SHIPPED | OUTPATIENT
Start: 2019-01-17 | End: 2019-02-14

## 2019-02-05 ENCOUNTER — TELEPHONE (OUTPATIENT)
Dept: FAMILY MEDICINE CLINIC | Age: 84
End: 2019-02-05

## 2019-02-06 ENCOUNTER — TELEPHONE (OUTPATIENT)
Dept: FAMILY MEDICINE CLINIC | Age: 84
End: 2019-02-06

## 2019-02-06 NOTE — TELEPHONE ENCOUNTER
Pt son call requesting for Dr. Mary Espitia to call and speak w/ pt. He needs provider to explain to pt why someone is needed to be there w/ him while son is at work. Son states that pt does not think someone should be there with him.       Best contact:[phone]

## 2019-02-07 NOTE — TELEPHONE ENCOUNTER
Returned phone call to patient, no answer. Per Dr. Reba Adkins: \"We have discussed my concerns about his home safety before. Juan Jose Garciaraul he should be in a nursing home. Luis Marshall and his son will have to make that decision.  I have made it known to them that I think he needs 24/7 observation.    Thank you\"

## 2019-02-12 ENCOUNTER — OFFICE VISIT (OUTPATIENT)
Dept: FAMILY MEDICINE CLINIC | Age: 84
End: 2019-02-12

## 2019-02-12 VITALS
OXYGEN SATURATION: 100 % | TEMPERATURE: 97.3 F | RESPIRATION RATE: 18 BRPM | HEIGHT: 67 IN | WEIGHT: 111.4 LBS | SYSTOLIC BLOOD PRESSURE: 149 MMHG | BODY MASS INDEX: 17.48 KG/M2 | DIASTOLIC BLOOD PRESSURE: 62 MMHG | HEART RATE: 65 BPM
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
## 2019-02-12 DIAGNOSIS — D64.9 CHRONIC ANEMIA: Chronic | ICD-10-CM

## 2019-02-12 DIAGNOSIS — D64.9 ANEMIA, UNSPECIFIED TYPE: Primary | Chronic | ICD-10-CM

## 2019-02-12 DIAGNOSIS — I25.10 CORONARY ARTERY DISEASE INVOLVING NATIVE CORONARY ARTERY OF NATIVE HEART WITHOUT ANGINA PECTORIS: Chronic | ICD-10-CM

## 2019-02-12 NOTE — PROGRESS NOTES
[address], [address] [place] [address] 
[phone]  
 
 
Progress Note Patient: Adalid Erickson MRN: [id number]  SSN: [id number] YOB: 1923  Age: 80 y.o. Sex: male Chief Complaint Patient presents with  Weight Loss  Anemia Subjective:  
 
Encounter Diagnoses Name Primary?  Anemia, unspecified type:ACD. His son says he is eating well. He has gained 2 pounds since his visit here. No sx. Lab Results Component Value Date/Time HGB 10.5 (L) 12/18/2018 04:52 PM  
 
Lab Results Component Value Date/Time Iron 57 04/12/2017 08:45 AM  
 TIBC 287 04/12/2017 08:45 AM  
 Iron % saturation 20 04/12/2017 08:45 AM  
 Ferritin 104 05/17/2013 08:07 AM  
 
 
 Yes  Coronary artery disease involving native coronary artery of native heart without angina pectoris: 
No chest pain, MATA or SOB. No PND or orthopnea.  Fall, initial encounter: Since his last visit here he is fallen twice. These are mechanical falls. Ms. series of the 2 falls with a fall in the kitchen where he missed the chair. The bulk of the discussion today surrounded his long-term supervisory needs and fall prevention. Apparently he has a long-term care plan that will pay for sitters and/or placement in a facility. The patient and his son are both adamantly against him being in a nursing home. His son has already reached out to formerly [facility] who runs [facility]. The patient despite his physical difficulty with ambulation has a bright mind and is fully capable of making decisions. He still balances his own checkbook. The patient is eating very well and is actually gained 2 pounds. [facility] health center and PT and OT to the home. His son Dyana Dhillon has to return to work he will definitely need additional help with supervision and fall prevention.   Times that he could stay at night if [facility] could place people during the day. That seems like a workable alternative if his long-term care policy will cover it. His son would not commit to anything specifically until he gets financial information back from the insurance company. Apparently he gets partial benefits even though the company has gone out of business. Current and past medical information: 
 
Current Medications after this visit[de-identified]    
Current Outpatient Medications Medication Sig  
 atenolol (TENORMIN) 25 mg tablet TAKE 1/2 TABLET DAILY  silodosin (RAPAFLO) 8 mg capsule Take 1 Cap by mouth nightly.  oxybutynin (DITROPAN) 5 mg tablet Take 1 Tab by mouth two (2) times a day.  acetaminophen-codeine (TYLENOL-CODEINE #3) 300-30 mg per tablet Take 1 Tab by mouth every four (4) hours as needed for Pain.  lidocaine (LIDODERM) 5 % Apply patch to the affected area for 12 hours a day and remove for 12 hours a day.  NITROSTAT 0.4 mg SL tablet PLACE 1 TABLET BY MOUTH UNDER TONGUE EVERY 5 MINUTES AS NEEDED  colestipol (COLESTID) 1 gram tablet TAKE ONE TABLET BY MOUTH TWICE DAILY  food supplemt, lactose-reduced (BOOST HIGH PROTEIN) liqd Take 237 mL by mouth three (3) times daily.  LACTOBACILLUS RHAMNOSUS GG (PROBIOTIC PO) Take  by mouth two (2) times a day.  Cholecalciferol, Vitamin D3, (VITAMIN D) 1,000 unit Cap Take  by mouth daily.  aspirin delayed-release 81 mg tablet Take 81 mg by mouth daily.  cyanocobalamin (VITAMIN B-12) 1,000 mcg tablet Take 1,000 mcg by mouth daily.  traMADol (ULTRAM) 50 mg tablet Take 0.5 Tabs by mouth every eight (8) hours as needed for Pain. Max Daily Amount: 75 mg.  
 naproxen (NAPROSYN) 500 mg tablet Take 1 Tab by mouth two (2) times daily (with meals). No current facility-administered medications for this visit. Patient Active Problem List  
 Diagnosis Date Noted  PAC (premature atrial contraction) 07/29/2014  Chronic anemia 02/05/2014  GERD (gastroesophageal reflux disease) 05/17/2012  Carotid artery disease ([facility] 07/19/2011  Anemia 09/15/2010  Pure hypercholesterolemia 05/12/2010  Esophageal reflux 05/12/2010  BPH (benign prostatic hyperplasia) 05/12/2010  Gastritis 05/12/2010  CAD (coronary artery disease) 05/12/2010  
 Hiatal hernia 05/12/2010 Past Medical History:  
Diagnosis Date  BPH (benign prostatic hyperplasia) 5/12/2010  CAD (coronary artery disease)  Carotid artery disease ([facility] 7/19/2011  Esophageal reflux 5/12/2010  Gastritis 5/12/2010  
 Hiatal hernia 5/12/2010  
 History of melanoma  IBS (irritable bowel syndrome)  Melanoma ([facility] 5/12/2010  Pure hypercholesterolemia 5/12/2010  S/P CABG (coronary artery bypass graft)  Tooth fracture 2/14 No Known Allergies Past Surgical History:  
Procedure Laterality Date  ABDOMEN SURGERY PROC UNLISTED  1990  
 hernia, right inguinal  
 DUPLEX CAROTID BILATERAL  7/2011 Mild bilateral disease  ECHO 2D ADULT  2010  
 normal LV wall motion and ejection fraction, left atrial enlargement, mild aortic regurgitation, mild to moderate mitral regurgitation and mild tricuspid regurgitation. The ejection fraction was 55-60%. Vickey Forno 76 LIMA to LAD, SVG to diagonal, SVG to trifurcation vessel and SVG to obtuse marginal.   
 [address] Left ventricular wall motion is mild hypokinesis of the anterior wall. Ejection Fraction is estimated at 55%. The Coronary Angiography revealed:. LAD 80% stenosis. OM1 80% stenosis. RCA >90% stenosis.  HX HEENT    
 melanoma surgery x2  HX OTHER SURGICAL Treadmill stress test 7/26/12 - walked 6:43, no chest pain, (8.0 METS); stopped for SOB; no ischemic EKG changes, frequent PVC's including in couplets. Also one brief run of NSVT (7 beats) during stage 2.   
 HX OTHER SURGICAL Carotid dopplers 7/12  show 10-49% stenosis bilat.  HX OTHER SURGICAL Exercise cardiolite 8/10/12 - walked 7 min without chest pain; EKG with anteroseptal infarct age undetermined; no ischemic EKG changes; short runs (7 beat) of NSVT during exercise. Normal myocardial perfusion and function. LVEF 69%  HX OTHER SURGICAL Carotid duplex 6/20/14 - mild 10-49% stenosis bilat  STRESS TEST - GXT  7/2011 WNL Social History Socioeconomic History  Marital status:  Spouse name: Not on file  Number of children: Not on file  Years of education: Not on file  Highest education level: Not on file Tobacco Use  Smoking status: Never Smoker  Smokeless tobacco: Never Used Substance and Sexual Activity  Alcohol use: Yes Alcohol/week: 0.0 oz  
  Comment: Occasionally  Drug use: No  
 
 
Review of Systems Constitutional: Negative. Negative for chills, fever, malaise/fatigue and weight loss. HENT: Negative. Negative for hearing loss. Eyes: Negative. Respiratory: Negative. Negative for cough. Cardiovascular: Negative. Negative for chest pain, palpitations and orthopnea. Gastrointestinal: Negative. Negative for abdominal pain, blood in stool, heartburn, nausea and vomiting. Genitourinary: Negative. Musculoskeletal: Positive for back pain. His back pain is gotten much better. He certainly does not have any pain while sitting. 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 Skin: Negative. Negative for rash. Neurological: Negative. Negative for dizziness, tingling, tremors, weakness and headaches. Endo/Heme/Allergies: Negative. Psychiatric/Behavioral: Negative. Negative for depression. Objective:  
 
Vitals:  
 02/12/19 1536 BP: 149/62 Pulse: 65 Resp: 18 Temp: 97.3 °F (36.3 °C) TempSrc: Oral  
SpO2: 100% Weight: 111 lb 6.4 oz (50.5 kg) Height: 5' 7\" (1.702 m) Body mass index is 17.45 kg/m². Physical Exam  
Constitutional: He is oriented to person, place, and time and well-developed, well-nourished, and in no distress. HENT:  
Head: Normocephalic and atraumatic. Mouth/Throat: Oropharynx is clear and moist.  
Eyes: Right eye exhibits no discharge. Left eye exhibits no discharge. No scleral icterus. Neck: No thyromegaly present. No bruit. Cardiovascular: Normal rate, regular rhythm and normal heart sounds. Pulmonary/Chest: Effort normal and breath sounds normal.  
Abdominal: Soft. Neurological: He is alert and oriented to person, place, and time. Skin: No rash noted. No erythema. Psychiatric: Mood and affect normal.  
Nursing note and vitals reviewed. There are no preventive care reminders to display for this patient. Assessment and orders:  
 
Encounter Diagnoses ICD-10-CM ICD-9-CM 1. Anemia, unspecified type D64.9 285.9 2. Coronary artery disease involving native coronary artery of native heart without angina pectoris I25.10 414.01  
3. Chronic anemia D64.9 285.9 4. Fall, initial encounter [address] Amari Prado G617.7 Diagnoses and all orders for this visit: 
 
1. Anemia, unspecified type-stable. No labs needed today. 2. Coronary artery disease involving native coronary artery of native heart without angina pectoris- no heart symptoms 3. Chronic anemia stable 4. Fall, initial encounter-see discussion above. Plan of care: 
Discussed diagnoses in detail with patient. Medication risks/benefits/side effects discussed with patient. All of the patient's questions were addressed. The patient understands and agrees with our plan of care. The patient knows to call back if they are unsure of or forget any changes we discussed today or if the symptoms change.  
  
The patient received an After-Visit Summary which contains VS, orders, medication list and allergy list. This can be used as a \"mini-medical record\" should they have to seek medical care while out of town. Patient Care Team: 
Pepe Nelson MD as PCP - General 
Ever Connor MD as Physician (Dermatology) Candy Rutherford MD as Physician (Gastroenterology) Josué Crenshaw MD (Cardiology) Shruthi Alarcon MD (Urology) Nicol Cordon RN as Ambulatory Care Navigator Follow-up Disposition: 
Return in about 2 months (around 4/12/2019). Future Appointments Date Time Provider [facility] 2/14/2019  1:00 PM Bianca Crenshaw MD [address] [place] [address]  
4/16/2019  2:45 PM Pepe Nelson MD [facility] Signed By: Miki Pandya MD   
 February 12, 2019 This note was created using voice recognition software. Despite editing, there may be syntax errors.

## 2019-02-12 NOTE — PROGRESS NOTES
1. Have you been to the ER, urgent care clinic since your last visit? Hospitalized since your last visit? No 
 
2. Have you seen or consulted any other health care providers outside of the [address] [place] [address] since your last visit? Include any pap smears or colon screening. No 
Reviewed record in preparation for visit and have necessary documentation Pt did not bring medication to office visit for review Information was given to pt on Advanced Directives, Living Will Information was given on Shingles Vaccine 
opportunity was given for questions Goals that were addressed and/or need to be completed during or after this appointment include There are no preventive care reminders to display for this patient.

## 2019-02-12 NOTE — PATIENT INSTRUCTIONS

## 2019-02-14 ENCOUNTER — OFFICE VISIT (OUTPATIENT)
Dept: CARDIOLOGY CLINIC | Age: 84
End: 2019-02-14

## 2019-02-14 VITALS
RESPIRATION RATE: 16 BRPM | DIASTOLIC BLOOD PRESSURE: 60 MMHG | SYSTOLIC BLOOD PRESSURE: 104 MMHG | BODY MASS INDEX: 17.11 KG/M2 | WEIGHT: 109 LBS | HEART RATE: 64 BPM | HEIGHT: 67 IN

## 2019-02-14 DIAGNOSIS — I10 ESSENTIAL HYPERTENSION: ICD-10-CM

## 2019-02-14 DIAGNOSIS — I25.10 CORONARY ARTERY DISEASE INVOLVING NATIVE CORONARY ARTERY OF NATIVE HEART WITHOUT ANGINA PECTORIS: Primary | Chronic | ICD-10-CM

## 2019-02-14 NOTE — PROGRESS NOTES
Chief Complaint Patient presents with  Coronary Artery Disease  Hypertension  Cholesterol Problem Visit Vitals /60 (BP 1 Location: Left arm, BP Patient Position: Sitting) Pulse 64 Resp 16 Ht 5' 7\" (1.702 m) Wt 109 lb (49.4 kg) BMI 17.07 kg/m²

## 2019-02-14 NOTE — PROGRESS NOTES
Cookie Hopkins MD. [facility] Patient: Mian Price : 8/15/1923 Today's Date: 2019 HISTORY OF PRESENT ILLNESS:  
 
History of Present Illness: 
Here for follow-up. He has a hard time getting around. Muscles are weak. No CP or SOB. Is in wheelchair most of the time. PAST MEDICAL HISTORY:  
 
Past Medical History:  
Diagnosis Date  BPH (benign prostatic hyperplasia) 2010  CAD (coronary artery disease)  Carotid artery disease ([facility] [address] 2011  Esophageal reflux 2010  Gastritis 2010  
 Hiatal hernia 2010  
 History of melanoma  IBS (irritable bowel syndrome)  Melanoma ([facility] [address] 2010  Pure hypercholesterolemia 2010  S/P CABG (coronary artery bypass graft)  Tooth fracture  Past Surgical History:  
Procedure Laterality Date  ABDOMEN SURGERY PROC UNLISTED    
 hernia, right inguinal  
 DUPLEX CAROTID BILATERAL  2011 Mild bilateral disease  ECHO 2D ADULT    
 normal LV wall motion and ejection fraction, left atrial enlargement, mild aortic regurgitation, mild to moderate mitral regurgitation and mild tricuspid regurgitation. The ejection fraction was 55-60%. [address] 76 LIMA to LAD, SVG to diagonal, SVG to trifurcation vessel and SVG to obtuse marginal.   
 [address] Left ventricular wall motion is mild hypokinesis of the anterior wall. Ejection Fraction is estimated at 55%. The Coronary Angiography revealed:. LAD 80% stenosis. OM1 80% stenosis. RCA >90% stenosis.  HX HEENT    
 melanoma surgery x2  HX OTHER SURGICAL Treadmill stress test 12 - walked 6:43, no chest pain, (8.0 METS); stopped for SOB; no ischemic EKG changes, frequent PVC's including in couplets. Also one brief run of NSVT (7 beats) during stage 2.   
 HX OTHER SURGICAL Carotid dopplers   show 10-49% stenosis bilat.  HX OTHER SURGICAL Exercise cardiolite 8/10/12 - walked 7 min without chest pain; EKG with anteroseptal infarct age undetermined; no ischemic EKG changes; short runs (7 beat) of NSVT during exercise. Normal myocardial perfusion and function. LVEF 69%  HX OTHER SURGICAL Carotid duplex 6/20/14 - mild 10-49% stenosis bilat  STRESS TEST - GXT  7/2011 WNL MEDICATIONS:  
 
Current Outpatient Medications Medication Sig Dispense Refill  atenolol (TENORMIN) 25 mg tablet TAKE 1/2 TABLET DAILY 45 Tab 3  
 silodosin (RAPAFLO) 8 mg capsule Take 1 Cap by mouth nightly. 90 Cap 2  
 oxybutynin (DITROPAN) 5 mg tablet Take 1 Tab by mouth two (2) times a day. 60 Tab 5  
 naproxen (NAPROSYN) 500 mg tablet Take 1 Tab by mouth two (2) times daily (with meals). 28 Tab 0  
 NITROSTAT 0.4 mg SL tablet PLACE 1 TABLET BY MOUTH UNDER TONGUE EVERY 5 MINUTES AS NEEDED 25 Tab 3  
 colestipol (COLESTID) 1 gram tablet TAKE ONE TABLET BY MOUTH TWICE DAILY 60 Tab 11  
 food supplemt, lactose-reduced (BOOST HIGH PROTEIN) liqd Take 237 mL by mouth three (3) times daily.  LACTOBACILLUS RHAMNOSUS GG (PROBIOTIC PO) Take  by mouth two (2) times a day.  Cholecalciferol, Vitamin D3, (VITAMIN D) 1,000 unit Cap Take  by mouth daily.  aspirin delayed-release 81 mg tablet Take 81 mg by mouth daily.  cyanocobalamin (VITAMIN B-12) 1,000 mcg tablet Take 1,000 mcg by mouth daily. No Known Allergies SOCIAL HISTORY:  
 
Social History Tobacco Use  Smoking status: Never Smoker  Smokeless tobacco: Never Used Substance Use Topics  Alcohol use: Yes Alcohol/week: 0.0 oz  
  Comment: Occasionally  Drug use: No  
 
 
 
FAMILY HISTORY:  
 
Family History Problem Relation Age of Onset  Heart Disease Mother  Heart Disease Father   
 
 
 
  
REVIEW OF SYSTEMS:    
   
Review of Systems:   
Constitutional: Negative for fever, chills   
HEENT: Negative for nosebleeds   
 Respiratory: No SOB Cardiovascular: Negative for syncope, orthopnea, and PND.   
Gastrointestinal: Negative for melena.   
Genitourinary: Negative for dysuria   
Musculoskeletal: Negative for myalgias.   
Skin: Negative for rash   
Heme: No problems bleeding.   
Neurological: Negative for speech change and focal weakness.   
+ IBS  
   
   
PHYSICAL EXAM:    
   
Physical Exam:  
   
Visit Vitals /60 (BP 1 Location: Left arm, BP Patient Position: Sitting) Pulse 64 Resp 16 Ht 5' 7\" (1.702 m) Wt 109 lb (49.4 kg) BMI 17.07 kg/m² Patient appears generally well, mood and affect are appropriate and pleasant.   
HEENT: Normocephalic, atraumatic. Sclera anicteric. Hearing intact Neck Exam: Supple, no JVD sitting up. + left neck bruit   
Lung Exam: Clear to auscultation, even breath sounds.   
Cardiac Exam: Regular rate and rhythm with no murmur   
Abdomen: Soft, non-tender, normal bowel sounds. Extremities: Trace lower extremity edema.  MAW.   
Psych - appropriate affect Neuro - non focal  
   
   
   
LABS / OTHER STUDIES:    
   
Lab Results Component Value Date/Time Sodium 140 11/20/2018 03:15 PM  
 Potassium 4.8 11/20/2018 03:15 PM  
 Chloride 103 11/20/2018 03:15 PM  
 CO2 24 11/20/2018 03:15 PM  
 Anion gap 9 09/14/2010 08:42 AM  
 Glucose 100 (H) 11/20/2018 03:15 PM  
 BUN 32 11/20/2018 03:15 PM  
 Creatinine 0.98 11/20/2018 03:15 PM  
 BUN/Creatinine ratio 33 (H) 11/20/2018 03:15 PM  
 GFR est AA 75 11/20/2018 03:15 PM  
 GFR est non-AA 65 11/20/2018 03:15 PM  
 Calcium 9.1 11/20/2018 03:15 PM  
 Bilirubin, total 0.4 11/20/2018 03:15 PM  
 AST (SGOT) 23 11/20/2018 03:15 PM  
 Alk. phosphatase 118 (H) 11/20/2018 03:15 PM  
 Protein, total 7.0 11/20/2018 03:15 PM  
 Albumin 3.7 11/20/2018 03:15 PM  
 Globulin 3.4 09/14/2010 08:42 AM  
 A-G Ratio 1.1 (L) 11/20/2018 03:15 PM  
 ALT (SGPT) 15 11/20/2018 03:15 PM  
 
 
Lab Results Component Value Date/Time  WBC 4.5 12/18/2018 04:52 PM  
 HGB 10.5 (L) 12/18/2018 04:52 PM  
 HCT 33.1 (L) 12/18/2018 04:52 PM  
 PLATELET 664 53/92/5810 04:52 PM  
 MCV 95 12/18/2018 04:52 PM  
 
 
Lab Results Component Value Date/Time Cholesterol, total 125 07/25/2018 12:24 PM  
 HDL Cholesterol 39 (L) 07/25/2018 12:24 PM  
 LDL, calculated 58 07/25/2018 12:24 PM  
 VLDL, calculated 28 07/25/2018 12:24 PM  
 Triglyceride 138 07/25/2018 12:24 PM  
 CHOL/HDL Ratio 3.7 09/14/2010 08:42 AM  
 
 
Lab Results Component Value Date/Time TSH 1.920 12/18/2018 04:52 PM  
 
 
  
   
CARDIAC DIAGNOSTICS:    
   
Cardiac Evaluation Includes: 
EKG 6/20/14 - sinus bradycardia, 1st degree AV block ()   
EKG 1/8/15 - NSR, 1st degree AVB, PRWP, PVC 
EKG 1/12/16 - NSR, 1st degree AV block, old anterior infarct EKG 1/20/17 - NSR, 1st degree AVB, possible old septal infarct   
EKG 1/26/18 - NSR, 1st degree AVB, possible old septal infarct   
EKG 2/14/19 - Poor Data quality. EKG done patient sitting in a wheelchair. Undetermined   Rhythm  (probably NSR)  LAD.  LVH. Cannot rule out old inferior infarct  
 
   
Carotid Doppler 6/14 - 10-49% stenosis bilat   
Holter 7/14 - NSR, some PAC and PVC's Echo 12/3/15 - LVEF 65 %. Grade 1 diastolic dysfunction. RV size upper normal. Mild LAE. Mild-mod MR. Mild AI. Mod TR. RVSP 47.   
   
CXR 11/23/15 -  Mild pulmonary interstitial edema. No evidence of pneumonia.    
   
   
ASSESSMENT AND PLAN:    
   
Assessment and Plan:   
1) CAD -   
- Mr. Abdelrahman Can denies any anginal complaints   
- Continue medical management of CAD  
- Given muscle weakness, stopped simvastatin just in case it may be causing any trouble but no improvement - On 2/14/19 - will stop atenolol due to weakness and lowish BP - he follow BP at home and let me know if it is high 
   
2) Mild Carotid disease - Carotid duplex 6/20/14 - mild 10-49% stenosis bilat   
- on ASA 
   
3) Dyslipidemia - Given muscle weakness, stopped simvastatin just in case it may be causing any trouble (no improvement off of it) - See above 
   
4) HTN   
- BP fair on low dose atenolol without orthostatic symptoms - Will stop atenolol  
   
5) RTC in 6 months.  Patient expressed understanding of the plan - questions were answered.      
On a side note he was in the [facility] during [address] serving in [facility]. [address] Dotty Elder MD, [address], [address] [place] [address]. [address], [address] [place] [address] Ph: [phone]                                                              [phone] None

## 2019-04-16 ENCOUNTER — OFFICE VISIT (OUTPATIENT)
Dept: FAMILY MEDICINE CLINIC | Age: 84
End: 2019-04-16

## 2019-04-16 VITALS
RESPIRATION RATE: 18 BRPM | WEIGHT: 106 LBS | HEART RATE: 85 BPM | OXYGEN SATURATION: 97 % | TEMPERATURE: 97.5 F | HEIGHT: 67 IN | BODY MASS INDEX: 16.64 KG/M2 | SYSTOLIC BLOOD PRESSURE: 137 MMHG | DIASTOLIC BLOOD PRESSURE: 67 MMHG

## 2019-04-16 DIAGNOSIS — K21.9 GASTROESOPHAGEAL REFLUX DISEASE WITHOUT ESOPHAGITIS: Chronic | ICD-10-CM

## 2019-04-16 DIAGNOSIS — E78.00 PURE HYPERCHOLESTEROLEMIA: Primary | Chronic | ICD-10-CM

## 2019-04-16 DIAGNOSIS — I25.10 CORONARY ARTERY DISEASE INVOLVING NATIVE CORONARY ARTERY OF NATIVE HEART WITHOUT ANGINA PECTORIS: Chronic | ICD-10-CM

## 2019-04-16 DIAGNOSIS — D64.9 CHRONIC ANEMIA: Chronic | ICD-10-CM

## 2019-04-16 NOTE — PROGRESS NOTES
Chief Complaint Patient presents with  Weight Loss  Anemia Body mass index is 16.6 kg/m². 1. Have you been to the ER, urgent care clinic since your last visit? Hospitalized since your last visit? No 
 
2. Have you seen or consulted any other health care providers outside of the [address] [place] [address] since your last visit? Include any pap smears or colon screening. No 
 
Reviewed record in preparation for visit and have necessary documentation Pt did not bring medication to office visit for review Information was given to pt on Advanced Directives, Living Will Information was given on Shingles Vaccine Opportunity was given for questions Goals that were addressed and/or need to be completed after this appointment include:  
 
Health Maintenance Due Topic Date Due  Pneumococcal 65+ years (2 of 2 - PPSV23) 04/30/2016  MEDICARE YEARLY EXAM  04/18/2019

## 2019-04-17 LAB
ALBUMIN SERPL-MCNC: 3.8 G/DL (ref 3.2–4.6)
ALBUMIN/GLOB SERPL: 1.2 {RATIO} (ref 1.2–2.2)
ALP SERPL-CCNC: 122 IU/L (ref 39–117)
ALT SERPL-CCNC: 13 IU/L (ref 0–44)
AST SERPL-CCNC: 14 IU/L (ref 0–40)
BASOPHILS # BLD AUTO: 0 X10E3/UL (ref 0–0.2)
BASOPHILS NFR BLD AUTO: 0 %
BILIRUB SERPL-MCNC: 0.2 MG/DL (ref 0–1.2)
BUN SERPL-MCNC: 22 MG/DL (ref 10–36)
BUN/CREAT SERPL: 31 (ref 10–24)
CALCIUM SERPL-MCNC: 9.1 MG/DL (ref 8.6–10.2)
CHLORIDE SERPL-SCNC: 100 MMOL/L (ref 96–106)
CO2 SERPL-SCNC: 28 MMOL/L (ref 20–29)
CREAT SERPL-MCNC: 0.72 MG/DL (ref 0.76–1.27)
EOSINOPHIL # BLD AUTO: 0 X10E3/UL (ref 0–0.4)
EOSINOPHIL NFR BLD AUTO: 1 %
ERYTHROCYTE [DISTWIDTH] IN BLOOD BY AUTOMATED COUNT: 15.4 % (ref 12.3–15.4)
GLOBULIN SER CALC-MCNC: 3.3 G/DL (ref 1.5–4.5)
GLUCOSE SERPL-MCNC: 156 MG/DL (ref 65–99)
HCT VFR BLD AUTO: 33.5 % (ref 37.5–51)
HGB BLD-MCNC: 10.9 G/DL (ref 13–17.7)
IMM GRANULOCYTES # BLD AUTO: 0 X10E3/UL (ref 0–0.1)
IMM GRANULOCYTES NFR BLD AUTO: 0 %
LYMPHOCYTES # BLD AUTO: 1.3 X10E3/UL (ref 0.7–3.1)
LYMPHOCYTES NFR BLD AUTO: 27 %
MCH RBC QN AUTO: 30.8 PG (ref 26.6–33)
MCHC RBC AUTO-ENTMCNC: 32.5 G/DL (ref 31.5–35.7)
MCV RBC AUTO: 95 FL (ref 79–97)
MONOCYTES # BLD AUTO: 0.4 X10E3/UL (ref 0.1–0.9)
MONOCYTES NFR BLD AUTO: 8 %
NEUTROPHILS # BLD AUTO: 3.2 X10E3/UL (ref 1.4–7)
NEUTROPHILS NFR BLD AUTO: 64 %
PLATELET # BLD AUTO: 285 X10E3/UL (ref 150–379)
POTASSIUM SERPL-SCNC: 4.2 MMOL/L (ref 3.5–5.2)
PROT SERPL-MCNC: 7.1 G/DL (ref 6–8.5)
RBC # BLD AUTO: 3.54 X10E6/UL (ref 4.14–5.8)
SODIUM SERPL-SCNC: 141 MMOL/L (ref 134–144)
WBC # BLD AUTO: 5 X10E3/UL (ref 3.4–10.8)

## 2019-04-23 ENCOUNTER — TELEPHONE (OUTPATIENT)
Dept: FAMILY MEDICINE CLINIC | Age: 84
End: 2019-04-23

## 2019-04-23 NOTE — LETTER
4/23/2019 2:05 PM 
 
Mr. Justin Pham [address] [facility] [address] To Whom It May Concern: 
 
Justin Pham is currently under the care of Sandra Marks. He requires a Home Care Aide because of severe debility. He can transfer himself short distances and can dress himself but requires assistance with all other Activities of Daily Living. He also has a history of falls. If there are questions or concerns please have the patient contact our office.  
 
 
 
Sincerely, 
 
 
Magda Marquez MD

## 2019-04-23 NOTE — TELEPHONE ENCOUNTER
Returned phone call to patients son, Enrique Pimentel. Advised him that Dr. Astrid Watson is out of the office this week and that another provider can write the letter but we need a little more information. Enrique Pimentel reports that his father can dress himself and move to his recliner and that is about it. He cannot cook or clean for himself. He has a history of falls.

## 2019-04-23 NOTE — TELEPHONE ENCOUNTER
Mil Betts (son) states need a letter stating he need Home Health assistant for his insurance company. Pt insurance does cover for the services, but need letter by PCP.     Thanks

## 2019-08-26 ENCOUNTER — OFFICE VISIT (OUTPATIENT)
Dept: FAMILY MEDICINE CLINIC | Age: 84
End: 2019-08-26

## 2019-08-26 VITALS
RESPIRATION RATE: 20 BRPM | HEART RATE: 94 BPM | HEIGHT: 67 IN | OXYGEN SATURATION: 97 % | DIASTOLIC BLOOD PRESSURE: 57 MMHG | SYSTOLIC BLOOD PRESSURE: 118 MMHG | TEMPERATURE: 98.4 F | BODY MASS INDEX: 18.05 KG/M2 | WEIGHT: 115 LBS

## 2019-08-26 DIAGNOSIS — D64.9 CHRONIC ANEMIA: Chronic | ICD-10-CM

## 2019-08-26 DIAGNOSIS — R63.4 WEIGHT LOSS: ICD-10-CM

## 2019-08-26 DIAGNOSIS — R53.81 DEBILITY: ICD-10-CM

## 2019-08-26 DIAGNOSIS — E78.00 PURE HYPERCHOLESTEROLEMIA: Chronic | ICD-10-CM

## 2019-08-26 DIAGNOSIS — I25.10 CORONARY ARTERY DISEASE INVOLVING NATIVE CORONARY ARTERY OF NATIVE HEART WITHOUT ANGINA PECTORIS: Chronic | ICD-10-CM

## 2019-08-26 DIAGNOSIS — Z23 ENCOUNTER FOR IMMUNIZATION: Primary | ICD-10-CM

## 2019-08-26 DIAGNOSIS — K21.9 GASTROESOPHAGEAL REFLUX DISEASE WITHOUT ESOPHAGITIS: ICD-10-CM

## 2019-08-26 NOTE — PROGRESS NOTES
[address]  [phone]           Progress Note    Patient: Bruon Lucas MRN: [id number]  SSN: [id number]    YOB: 1923  Age: 80 y.o. Sex: male        Chief Complaint   Patient presents with    Anemia         Subjective:     Encounter Diagnoses   Name Primary?  Encounter for immunization: Flu shot given today. Pneumonia 23 recommended. Yes    Coronary artery disease involving native coronary artery of native heart without angina pectoris:  No chest pain, MATA or SOB. No PND or orthopnea.  Gastroesophageal reflux disease without esophagitis:  Current control of Symptoms:good  Hiatal Hernia:no  Current Medications:PRN, nothing regular. The patient has no history melena or bright red blood in the stools. The patient avoids high dose aspirin and NSAID therapy. The patient is aware of diet changes needed, elevating the head of the bed and appropriate use of antacids.  Pure hypercholesterolemia:  Cardiovascular risks for him are: LDL goal is under 100  existing CAD  hypertension  hyperlipidemia. Key Antihyperlipidemia Meds             colestipol (COLESTID) 1 gram tablet (Taking) TAKE ONE TABLET BY MOUTH TWICE DAILY        Lab Results   Component Value Date/Time    Cholesterol, total 125 07/25/2018 12:24 PM    HDL Cholesterol 39 (L) 07/25/2018 12:24 PM    LDL, calculated 58 07/25/2018 12:24 PM    Triglyceride 138 07/25/2018 12:24 PM    CHOL/HDL Ratio 3.7 09/14/2010 08:42 AM     Lab Results   Component Value Date/Time    ALT (SGPT) 13 04/16/2019 04:50 PM    AST (SGOT) 14 04/16/2019 04:50 PM    Alk.  phosphatase 122 (H) 04/16/2019 04:50 PM    Bilirubin, total 0.2 04/16/2019 04:50 PM      Myalgias: No   Fatigue: No   Other side effects: no  Wt Readings from Last 3 Encounters:   08/26/19 115 lb (52.2 kg)   04/16/19 106 lb (48.1 kg)   02/14/19 109 lb (49.4 kg)     The patient is aware of our goal to reduce or eliminate the long term problems (such as strokes and heart attacks) related to poorly controlled hyperlipidemia.  Chronic anemia: ACD. No sx. Lab Results   Component Value Date/Time    HGB 10.9 (L) 04/16/2019 05:01 PM     Lab Results   Component Value Date/Time    Iron 57 04/12/2017 08:45 AM    TIBC 287 04/12/2017 08:45 AM    Iron % saturation 20 04/12/2017 08:45 AM    Ferritin 104 05/17/2013 08:07 AM         Wt. Loss: Gained 1 pound since last visit. Eats ice cream.  No recent falls. Level of debility is unchanged. Wt Readings from Last 3 Encounters:   08/26/19 115 lb (52.2 kg)   04/16/19 106 lb (48.1 kg)   02/14/19 109 lb (49.4 kg)                   Current and past medical information:    Current Medications after this visit[de-identified]     Current Outpatient Medications   Medication Sig    pneumococcal 23-valent (PNEUMOVAX 23) 25 mcg/0.5 mL injection 0.5 mL by IntraMUSCular route once for 1 dose. To be administered at Pharmacy. Fax confirmation to me at [phone]. Thank You.  silodosin (RAPAFLO) 8 mg capsule TAKE ONE CAPSULE BY MOUTH NIGHTLY    oxybutynin (DITROPAN) 5 mg tablet Take 1 Tab by mouth two (2) times a day.  naproxen (NAPROSYN) 500 mg tablet Take 1 Tab by mouth two (2) times daily (with meals).  NITROSTAT 0.4 mg SL tablet PLACE 1 TABLET BY MOUTH UNDER TONGUE EVERY 5 MINUTES AS NEEDED    colestipol (COLESTID) 1 gram tablet TAKE ONE TABLET BY MOUTH TWICE DAILY    food supplemt, lactose-reduced (BOOST HIGH PROTEIN) liqd Take 237 mL by mouth three (3) times daily.  LACTOBACILLUS RHAMNOSUS GG (PROBIOTIC PO) Take  by mouth two (2) times a day.  Cholecalciferol, Vitamin D3, (VITAMIN D) 1,000 unit Cap Take  by mouth daily.  aspirin delayed-release 81 mg tablet Take 81 mg by mouth daily.  cyanocobalamin (VITAMIN B-12) 1,000 mcg tablet Take 1,000 mcg by mouth daily. No current facility-administered medications for this visit.         Patient Active Problem List    Diagnosis Date Noted    Chronic anemia 02/05/2014     Priority: 1 - One    GERD (gastroesophageal reflux disease) 05/17/2012     Priority: 1 - One    Carotid artery disease ([facility] [address] 07/19/2011     Priority: 1 - One    Pure hypercholesterolemia 05/12/2010     Priority: 1 - One    Esophageal reflux 05/12/2010     Priority: 1 - One    CAD (coronary artery disease) 05/12/2010     Priority: 1 - One    PAC (premature atrial contraction) 07/29/2014    Anemia 09/15/2010    BPH (benign prostatic hyperplasia) 05/12/2010    Gastritis 05/12/2010    Hiatal hernia 05/12/2010       Past Medical History:   Diagnosis Date    BPH (benign prostatic hyperplasia) 5/12/2010    CAD (coronary artery disease)     Carotid artery disease ([facility] [address] 7/19/2011    Esophageal reflux 5/12/2010    Gastritis 5/12/2010    Hiatal hernia 5/12/2010    History of melanoma     IBS (irritable bowel syndrome)     Melanoma ([facility] [address] 5/12/2010    Pure hypercholesterolemia 5/12/2010    S/P CABG (coronary artery bypass graft)     Tooth fracture 2/14       No Known Allergies    Past Surgical History:   Procedure Laterality Date    ABDOMEN SURGERY PROC UNLISTED  1990    hernia, right inguinal    DUPLEX CAROTID BILATERAL  7/2011    Mild bilateral disease    ECHO 2D ADULT  2010    normal LV wall motion and ejection fraction, left atrial enlargement, mild aortic regurgitation, mild to moderate mitral regurgitation and mild tricuspid regurgitation. The ejection fraction was 55-60%.  HX CORONARY ARTERY BYPASS GRAFT  1995    LIMA to LAD, SVG to diagonal, SVG to trifurcation vessel and SVG to obtuse marginal.     HX HEART CATHETERIZATION  1995     Left ventricular wall motion is mild hypokinesis of the anterior wall. Ejection Fraction is estimated at 55%. The Coronary Angiography revealed:. LAD 80% stenosis. OM1 80% stenosis. RCA >90% stenosis.      HX HEENT      melanoma surgery x2    HX OTHER SURGICAL      Treadmill stress test 7/26/12 - walked 6:43, no chest pain, (8.0 METS); stopped for SOB; no ischemic EKG changes, frequent PVC's including in couplets. Also one brief run of NSVT (7 beats) during stage 2.     HX OTHER SURGICAL      Carotid dopplers 7/12  show 10-49% stenosis bilat.  HX OTHER SURGICAL      Exercise cardiolite 8/10/12 - walked 7 min without chest pain; EKG with anteroseptal infarct age undetermined; no ischemic EKG changes; short runs (7 beat) of NSVT during exercise. Normal myocardial perfusion and function. LVEF 69%     HX OTHER SURGICAL      Carotid duplex 6/20/14 - mild 10-49% stenosis bilat     STRESS TEST - GXT  7/2011    WNL       Social History     Socioeconomic History    Marital status:      Spouse name: Not on file    Number of children: Not on file    Years of education: Not on file    Highest education level: Not on file   Tobacco Use    Smoking status: Never Smoker    Smokeless tobacco: Never Used   Substance and Sexual Activity    Alcohol use: Yes     Alcohol/week: 0.0 standard drinks     Comment: Occasionally    Drug use: No       Review of Systems   Constitutional: Negative. Negative for chills, fever, malaise/fatigue and weight loss. HENT: Negative. Negative for hearing loss. Eyes: Negative. Negative for blurred vision and double vision. Respiratory: Negative. Negative for cough, sputum production, shortness of breath and wheezing. Cardiovascular: Negative. Negative for chest pain, palpitations and orthopnea. Gastrointestinal: Negative. Negative for abdominal pain, blood in stool, heartburn, nausea and vomiting. Genitourinary: Negative. Negative for dysuria, frequency and urgency. Musculoskeletal: Negative. Negative for back pain, falls, myalgias and neck pain. Skin: Negative. Negative for rash. Neurological: Negative. Negative for dizziness, tingling, tremors, weakness and headaches. Endo/Heme/Allergies: Negative. Psychiatric/Behavioral: Negative.   Negative for depression. Objective:     Vitals:    08/26/19 1520   BP: 118/57   Pulse: 94   Resp: 20   Temp: 98.4 °F (36.9 °C)   TempSrc: Oral   SpO2: 97%   Weight: 115 lb (52.2 kg)   Height: 5' 7\" (1.702 m)      Body mass index is 18.01 kg/m². Physical Exam   Constitutional: He is oriented to person, place, and time and well-developed, well-nourished, and in no distress. HENT:   Head: Normocephalic and atraumatic. Mouth/Throat: Oropharynx is clear and moist.   Eyes: Right eye exhibits no discharge. Left eye exhibits no discharge. No scleral icterus. Neck: No thyromegaly present. No bruit. Cardiovascular: Normal rate, regular rhythm and normal heart sounds. Exam reveals no friction rub. No murmur heard. Pulmonary/Chest: Effort normal and breath sounds normal. No respiratory distress. He has no wheezes. Abdominal: Soft. He exhibits no distension. Neurological: He is alert and oriented to person, place, and time. Skin: No rash noted. No erythema. Psychiatric: Mood and affect normal.   Nursing note and vitals reviewed. Health Maintenance Due   Topic Date Due    Pneumococcal 65+ years (2 of 2 - PPSV23) 04/30/2016    MEDICARE YEARLY EXAM  04/18/2019    Influenza Age 5 to Adult  08/01/2019         Assessment and orders:     Encounter Diagnoses     ICD-10-CM ICD-9-CM   1. Encounter for immunization Z23 V03.89   2. Coronary artery disease involving native coronary artery of native heart without angina pectoris I25.10 414.01   3. Gastroesophageal reflux disease without esophagitis K21.9 530.81   4. Pure hypercholesterolemia E78.00 272.0   5. Chronic anemia D64.9 285.9     Diagnoses and all orders for this visit:    1. Encounter for immunization  -     ADMIN INFLUENZA VIRUS VAC  -     INFLUENZA VACCINE INACTIVATED (IIV), SUBUNIT, ADJUVANTED, IM        -     pneumococcal 23-valent (PNEUMOVAX 23) 25 mcg/0.5 mL injection; 0.5 mL by IntraMUSCular       route once for 1 dose.  To be administered at Pharmacy. Fax confirmation to me at [phone]. Thank You.    2. Coronary artery disease involving native coronary artery of native heart without angina pectoris-stable    3. Gastroesophageal reflux disease without esophagitis-no current symptoms    4. Pure hypercholesterolemia-retest next visit    5. Chronic anemia-has been relatively stable. Will wait 3 months to repeat. 6. Debility: Has around-the-clock sitters. Wheelchair-bound his son provides the bulk of his care and observation. 7.weight loss resolved                  Plan of care:  Discussed diagnoses in detail with patient. Medication risks/benefits/side effects discussed with patient. All of the patient's questions were addressed. The patient understands and agrees with our plan of care. The patient knows to call back if they are unsure of or forget any changes we discussed today or if the symptoms change. The patient received an After-Visit Summary which contains VS, orders, medication list and allergy list. This can be used as a \"mini-medical record\" should they have to seek medical care while out of town. Patient Care Team:  Alli Boateng MD as PCP - General  Jenifer Felder MD as Physician (Dermatology)  Markel Junior MD as Physician (Gastroenterology)  Jenelle Walters MD (Cardiology)  Zaria Calderon MD (Urology)  Gabino Ramos RN as Ambulatory Care Navigator    Follow-up and Dispositions    · Return in about 3 months (around 11/26/2019). Future Appointments   Date Time Provider Taisha More   11/26/2019  2:45 PM Alli Boateng MD [facility] SCHED       Signed By: Lvei Rios MD     August 26, 2019      ATTENTION:   This medical record was transcribed using an electronic medical records/speech recognition system. Although proofread, it may and can contain electronic, spelling and other errors. Corrections may be executed at a later time.   Please feel free to contact me for any clarifications as needed.

## 2019-08-26 NOTE — PROGRESS NOTES
1. Have you been to the ER, urgent care clinic since your last visit? Hospitalized since your last visit? No    2. Have you seen or consulted any other health care providers outside of the [address] [place] [address] since your last visit? Include any pap smears or colon screening.  No  Reviewed record in preparation for visit and have necessary documentation  Pt did not bring medication to office visit for review  Information was given to pt on Advanced Directives, Living Will  Information was given on Shingles Vaccine  opportunity was given for questions  Goals that were addressed and/or need to be completed during or after this appointment include     Health Maintenance Due   Topic Date Due    Pneumococcal 65+ years (2 of 2 - PPSV23) 04/30/2016    MEDICARE YEARLY EXAM  04/18/2019    Influenza Age 9 to Adult  08/01/2019

## 2019-09-09 ENCOUNTER — OFFICE VISIT (OUTPATIENT)
Dept: FAMILY MEDICINE CLINIC | Age: 84
End: 2019-09-09

## 2019-09-09 VITALS
RESPIRATION RATE: 20 BRPM | HEIGHT: 67 IN | HEART RATE: 93 BPM | TEMPERATURE: 98.7 F | WEIGHT: 111 LBS | SYSTOLIC BLOOD PRESSURE: 135 MMHG | BODY MASS INDEX: 17.42 KG/M2 | OXYGEN SATURATION: 97 % | DIASTOLIC BLOOD PRESSURE: 56 MMHG

## 2019-09-09 DIAGNOSIS — M48.061 SPINAL STENOSIS OF LUMBAR REGION AT MULTIPLE LEVELS: ICD-10-CM

## 2019-09-09 DIAGNOSIS — R53.81 DEBILITY: ICD-10-CM

## 2019-09-09 DIAGNOSIS — S41.112A SKIN TEAR OF LEFT UPPER ARM WITHOUT COMPLICATION, INITIAL ENCOUNTER: Primary | ICD-10-CM
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
## 2019-09-09 DIAGNOSIS — S41.102A ARM WOUND, LEFT, INITIAL ENCOUNTER: ICD-10-CM

## 2019-09-09 DIAGNOSIS — I77.9 BILATERAL CAROTID ARTERY DISEASE, UNSPECIFIED TYPE (HCC): ICD-10-CM

## 2019-09-09 DIAGNOSIS — R63.4 WEIGHT LOSS: ICD-10-CM

## 2019-09-09 RX ORDER — TRAMADOL HYDROCHLORIDE 50 MG/1
TABLET ORAL
Refills: 0 | COMMUNITY
Start: 2019-09-04

## 2019-09-09 NOTE — PROGRESS NOTES
1. Have you been to the ER, urgent care clinic since your last visit? Hospitalized since your last visit? Yes When: 09/04/2019, [place], [place]    2. Have you seen or consulted any other health care providers outside of the [address] [place] [address] since your last visit? Include any pap smears or colon screening.  No  Reviewed record in preparation for visit and have necessary documentation  Pt did not bring medication to office visit for review    Goals that were addressed and/or need to be completed during or after this appointment include     Health Maintenance Due   Topic Date Due    Pneumococcal 65+ years (2 of 2 - PPSV23) 04/30/2016    MEDICARE YEARLY EXAM  04/18/2019

## 2019-09-09 NOTE — PROGRESS NOTES
Patient: Emily Wilkins MRN: [id number]  SSN: [id number]    YOB: 1923  Age: 80 y.o. Sex: male        Subjective:     Chief Complaint   Patient presents with   [facility] Follow Up       HPI: he is a 80y.o. year old male new to me who presents with his son post ED visit for fall at home on 9/4/2019. He fell trying to use bathroom. He suffered skin wounds at left wrist, forearm and upper arm. Xrays negative for acute fracture. Patient with physical debility and is now wheelchair bound. Son says he is trying to get in home assistance to help care for the patient during the day. Patient complains of acute on chronic LBP. Patient with hx of CAD, HLD, anemia, BPH, GERD  Patient denies F/C, HA, dizziness, SOB, CP, abdominal pain, dysuria, paresthesia or radiculopathy. Encounter Diagnoses   Name Primary?  Skin tear of left upper arm without complication, initial encounter Yes    Arm wound, left, initial encounter     Fall in home, initial encounter    [address] Weight loss     Spinal stenosis of lumbar region at multiple levels     Bilateral carotid artery disease, unspecified type (HCC)        BP Readings from Last 3 Encounters:   09/09/19 135/56   08/26/19 118/57   04/16/19 137/67       Wt Readings from Last 3 Encounters:   09/09/19 111 lb (50.3 kg)   08/26/19 115 lb (52.2 kg)   04/16/19 106 lb (48.1 kg)     Body mass index is 17.39 kg/m². Current and past medical information:    Current Medications after this visit[de-identified]     Current Outpatient Medications   Medication Sig    traMADol (ULTRAM) 50 mg tablet     silodosin (RAPAFLO) 8 mg capsule TAKE ONE CAPSULE BY MOUTH NIGHTLY    oxybutynin (DITROPAN) 5 mg tablet Take 1 Tab by mouth two (2) times a day.     NITROSTAT 0.4 mg SL tablet PLACE 1 TABLET BY MOUTH UNDER TONGUE EVERY 5 MINUTES AS NEEDED    colestipol (COLESTID) 1 gram tablet TAKE ONE TABLET BY MOUTH TWICE DAILY    Cholecalciferol, Vitamin D3, (VITAMIN D) 1,000 unit Cap Take  by mouth daily.  aspirin delayed-release 81 mg tablet Take 81 mg by mouth daily.  cyanocobalamin (VITAMIN B-12) 1,000 mcg tablet Take 1,000 mcg by mouth daily.  food supplemt, lactose-reduced (BOOST HIGH PROTEIN) liqd Take 237 mL by mouth three (3) times daily.  LACTOBACILLUS RHAMNOSUS GG (PROBIOTIC PO) Take  by mouth two (2) times a day. No current facility-administered medications for this visit. Patient Active Problem List    Diagnosis Date Noted    PAC (premature atrial contraction) 07/29/2014    Chronic anemia 02/05/2014    GERD (gastroesophageal reflux disease) 05/17/2012    Carotid artery disease [facility] [address] 07/19/2011    Anemia 09/15/2010    Pure hypercholesterolemia 05/12/2010    Esophageal reflux 05/12/2010    BPH (benign prostatic hyperplasia) 05/12/2010    Gastritis 05/12/2010    CAD (coronary artery disease) 05/12/2010    Hiatal hernia 05/12/2010       Past Medical History:   Diagnosis Date    BPH (benign prostatic hyperplasia) 5/12/2010    CAD (coronary artery disease)     Carotid artery disease ([facility] [address] 7/19/2011    Esophageal reflux 5/12/2010    Gastritis 5/12/2010    Hiatal hernia 5/12/2010    History of melanoma     IBS (irritable bowel syndrome)     Melanoma ([facility] [address] 5/12/2010    Pure hypercholesterolemia 5/12/2010    S/P CABG (coronary artery bypass graft)     Tooth fracture 2/14       No Known Allergies    Past Surgical History:   Procedure Laterality Date    ABDOMEN SURGERY PROC UNLISTED  1990    hernia, right inguinal    DUPLEX CAROTID BILATERAL  7/2011    Mild bilateral disease    ECHO 2D ADULT  2010    normal LV wall motion and ejection fraction, left atrial enlargement, mild aortic regurgitation, mild to moderate mitral regurgitation and mild tricuspid regurgitation. The ejection fraction was 55-60%.      HX CORONARY ARTERY BYPASS GRAFT  1995    LIMA to LAD, SVG to diagonal, SVG to trifurcation vessel and SVG to obtuse marginal.     HX HEART CATHETERIZATION  1995     Left ventricular wall motion is mild hypokinesis of the anterior wall. Ejection Fraction is estimated at 55%. The Coronary Angiography revealed:. LAD 80% stenosis. OM1 80% stenosis. RCA >90% stenosis.  HX HEENT      melanoma surgery x2    HX OTHER SURGICAL      Treadmill stress test 7/26/12 - walked 6:43, no chest pain, (8.0 METS); stopped for SOB; no ischemic EKG changes, frequent PVC's including in couplets. Also one brief run of NSVT (7 beats) during stage 2.     HX OTHER SURGICAL      Carotid dopplers 7/12  show 10-49% stenosis bilat.  HX OTHER SURGICAL      Exercise cardiolite 8/10/12 - walked 7 min without chest pain; EKG with anteroseptal infarct age undetermined; no ischemic EKG changes; short runs (7 beat) of NSVT during exercise. Normal myocardial perfusion and function. LVEF 69%     HX OTHER SURGICAL      Carotid duplex 6/20/14 - mild 10-49% stenosis bilat     STRESS TEST - GXT  7/2011    WNL       Social History     Socioeconomic History    Marital status:      Spouse name: Not on file    Number of children: Not on file    Years of education: Not on file    Highest education level: Not on file   Tobacco Use    Smoking status: Never Smoker    Smokeless tobacco: Never Used   Substance and Sexual Activity    Alcohol use:  Yes     Alcohol/week: 0.0 standard drinks     Comment: Occasionally    Drug use: No         Objective:     Review of Systems:  Constitutional: Negative for fever or chills  Derm: see HPI  HEENT: Negative for acute hearing or vision changes  Cardiovascular: Negative for dizziness, chest pain or palpitations  Respiratory: Negative for cough, wheezing or SOB  Gastrointestinal: Negative for nausea or abdominal pain  Genital/urinary: Negative for dysuria or voiding dysfunction  Musculoskeletal: see HPI  Neurological: Negative for headache, weakness or paresthesia  Psychological: Negative for depression or anxiety      Vitals:    09/09/19 1312 BP: 135/56   Pulse: 93   Resp: 20   Temp: 98.7 °F (37.1 °C)   TempSrc: Oral   SpO2: 97%   Weight: 111 lb (50.3 kg)   Height: 5' 7\" (1.702 m)      Body mass index is 17.39 kg/m². Physical Exam:  Constitutional: well developed, well nourished, in no acute distress  Skin: 2.0 cm lesions on left ulnar wrist and forearm, 6.0 x 12.0 cm lesion lateral left upper arm  Head: normocephalic, atraumatic  Eyes: sclera clear, EOMI, PERRL  Neck: normal range of motion  Cardiovascular: normal S1, S2, regular rate and rhythm  Respiratory: clear to auscultation bilaterally with symmetrical effort  Back: b/l lumbar TTP  Extremities: LUE with full range of motion, wheelchair bound  Neurology: normal strength and sensation  Psych: active, alert and oriented, affect appropriate       Assessment and orders:       ICD-10-CM ICD-9-CM    1. Skin tear of left upper arm without complication, initial encounter S41.112A 880.03 REFERRAL TO HOME HEALTH   2. Arm wound, left, initial encounter S41.102A 884.0 [facility]   3. Fall in home, initial encounter W19. XXXA E888.9 REFERRAL TO HOME HEALTH    Y92.009 E849.0    4. Debility R53.81 799.3 REFERRAL TO HOME HEALTH   5. Weight loss R63.4 783.21 REFERRAL TO HOME HEALTH   6. Spinal stenosis of lumbar region at multiple levels M48.061 724.02    7. Bilateral carotid artery disease, unspecified type (HCC) I77.9 447.9          Plan of care:  Diagnoses were discussed in detail with patient and son. .   Wounds cleaned, topical antibiotic applied and fresh dressings applied. Medication risks/benefits/side effects discussed with patient. All of the patient's questions were addressed and answered to apparent satisfaction. The patient understands and agrees with our plan of care. The patient knows to call back if they have questions about the plan of care or if symptoms change.   The patient received an After-Visit Summary which contains VS, diagnoses, orders, allergy and medication lists.  Over half of the 40 minutes face to face with Bridget Vega consisted of counseling and discussing treatment plans in reference to his wound care, fall risks, weight loss, debility and need for home health.       Patient Care Team:  Marcella Walters MD as PCP - General  Tianna Lee MD as Physician (Dermatology)  Arabella Crump MD as Physician (Gastroenterology)  Gale Fournier MD (Cardiology)  Rosalba Cook MD (Urology)  Georges Reed, RN as Ambulatory Care Navigator        Future Appointments   Date Time Provider Taisha Aguero   9/13/2019  2:30 PM Jacquelyn Moran MD [facility] SCHED   11/26/2019  2:45 PM Marcella Walters MD [facility]       Signed By: Ary Hernandez MD     September 9, 2019

## 2019-09-10 NOTE — PATIENT INSTRUCTIONS
Skin Tears: Care Instructions  Your Care Instructions  As we get older, our skin gets drier and more fragile. Sometimes this can cause the outer layers of skin to split and tear open. Skin tears are treated in different ways. In some cases, doctors use pieces of tape called Steri-Strips to pull the skin together and help it heal. Other times, it's best to leave the tear open and cover it with a special wound-care bandage. Skin tears are usually not serious. They usually heal in a few weeks. But how long you take to heal depends on your body and the type of tear you have. Sometimes the torn piece of skin is used to protect the wound while it heals. But that piece of skin does not heal. It may fall off on its own. Or the doctor may remove it. As your tear heals, it's important to keep it clean to help prevent infection. The doctor has checked you carefully, but problems can develop later. If you notice any problems or new symptoms, get medical treatment right away. Follow-up care is a key part of your treatment and safety. Be sure to make and go to all appointments, and call your doctor if you are having problems. It's also a good idea to know your test results and keep a list of the medicines you take. How can you care for yourself at home? · If you have pain, ask your doctor if you can take an over-the-counter pain medicine, such as acetaminophen (Tylenol), ibuprofen (Advil, Motrin), or naproxen (Aleve). Be safe with medicines. Read and follow all instructions on the label. · If you have a bandage, follow your doctor's instructions for changing it. · If you have Steri-Strips, leave them on until they fall off. · Follow your doctor's instructions about bathing. · Gently wash the skin tear with plain water 2 times a day. Do not rub the area. · Let the area air dry. Or you can pat it carefully with a soft towel. When should you call for help?   Call your doctor now or seek immediate medical care if:    · You have signs of infection, such as:  ? Increased pain, swelling, warmth, or redness around the tear. ? Red streaks leading from the tear. ? Pus draining from the tear. ? A fever.     · The tear starts to bleed a lot. Small amounts of blood are normal.    Watch closely for changes in your health, and be sure to contact your doctor if:    · You do not get better as expected. Where can you learn more? Go to http://vipin-josé miguel.info/. Enter L746 in the search box to learn more about \"Skin Tears: Care Instructions. \"  Current as of: September 23, 2018  Content Version: 12.1  © 9305-8298 [employer]. Care instructions adapted under license by [employer] (which disclaims liability or warranty for this information). If you have questions about a medical condition or this instruction, always ask your healthcare professional. [place] [address] any warranty or liability for your use of this information.

## 2020-01-01 ENCOUNTER — TELEPHONE (OUTPATIENT)
Dept: FAMILY MEDICINE CLINIC | Age: 85
End: 2020-01-01

## 2020-01-01 ENCOUNTER — VIRTUAL VISIT (OUTPATIENT)
Dept: FAMILY MEDICINE CLINIC | Age: 85
End: 2020-01-01
Payer: MEDICARE

## 2020-01-01 DIAGNOSIS — D64.9 CHRONIC ANEMIA: ICD-10-CM

## 2020-01-01 DIAGNOSIS — L89.219 PRESSURE INJURY OF SKIN OF RIGHT HIP, UNSPECIFIED INJURY STAGE: Primary | ICD-10-CM

## 2020-01-01 DIAGNOSIS — I77.9 BILATERAL CAROTID ARTERY DISEASE, UNSPECIFIED TYPE (HCC): ICD-10-CM

## 2020-01-01 DIAGNOSIS — Z91.81 HISTORY OF FALL WITHIN PAST 90 DAYS: ICD-10-CM

## 2020-01-01 DIAGNOSIS — R53.81 DEBILITY: ICD-10-CM

## 2020-01-01 PROCEDURE — 99441 PR PHYS/QHP TELEPHONE EVALUATION 5-10 MIN: CPT | Performed by: FAMILY MEDICINE

## 2020-10-02 NOTE — PROGRESS NOTES
Chief Complaint Patient presents with [address]  Ulcer 3 on right hip 1. Have you been to the ER, urgent care clinic since your last visit? Hospitalized since your last visit? No 
 
2. Have you seen or consulted any other health care providers outside of the [address] [place] [address] since your last visit? Include any pap smears or colon screening. No 
 
Reviewed record in preparation for visit and have necessary documentation Pt did not bring medication to office visit for review Information was given to pt on Advanced Directives, Living Will Information was given on Shingles Vaccine Opportunity was given for questions Goals that were addressed and/or need to be completed after this appointment include:  
 
Health Maintenance Due Topic Date Due  
 Statin Therapy  08/15/1923  Shingrix Vaccine Age 50> (1 of 2) 08/15/1973  Pneumococcal 65+ years (2 of 2 - PPSV23) 04/30/2016  GLAUCOMA SCREENING Q2Y  12/08/2018  Medicare Yearly Exam  04/18/2019  Flu Vaccine (1) 09/01/2020

## 2020-10-02 NOTE — PROGRESS NOTES
Karri Ramirez is a 80 y.o. male evaluated via telephone on 10/02/20. Patient Identity confirmed by . Telephone encounter done in lieu of office visit due to extraordinary circumstances. A state of national and state emergency has been declared by the President and the [place] due to the [employer] pandemic. Pursuant to the emergency declaration under the [facility] [place] [address] [facility] authority and the [employer] Act, this Virtual  Visit was conducted, with patient's consent, to reduce the patient's risk of exposure to COVID-19 and provide continuity of care for an established patient. Zainab Campbell LPN coordinated virtual visit Consent: 
Patient and/or health care decision maker is aware that he/she may receive a bill for this telephone encounter, depending on his insurance coverage, and has provided verbal consent to proceed: Yes Physician Location: Office Patient Location: Home CC: pressure ulcer/ skin wounds Information gathered from patient's son. HPI: Karri Ramirez is a 80 y.o. male who was evaluated by synchronous (real-time) audio technology from his/her home. Patient with physical debility and is now wheelchair bound. Unable to transfer from bed/chair/commode without physical assistance. Son says he has home care assistant 5 hours a day. However they do not address health concerns. Patient with pressure ulcer and multiple skin tears. Son asks for wound care. Patient with hx of CAD, HLD, anemia, BPH, GERD and chronic LBP. Encounter Diagnoses Name Primary?  Pressure injury of skin of right hip, unspecified injury stage Yes  Bilateral carotid artery disease, unspecified type ([facility] [address]  Debility  History of fall within past 90 days Current Outpatient Medications:  
  colestipol (COLESTID) 1 gram tablet, TAKE ONE TABLET BY MOUTH TWICE DAILY, Disp: 60 Tab, Rfl: 11 
   Cholecalciferol, Vitamin D3, (VITAMIN D) 1,000 unit Cap, Take  by mouth daily. , Disp: , Rfl:  
  aspirin delayed-release 81 mg tablet, Take 81 mg by mouth daily. , Disp: , Rfl:  
  cyanocobalamin (VITAMIN B-12) 1,000 mcg tablet, Take 1,000 mcg by mouth daily. , Disp: , Rfl:  
  traMADol (ULTRAM) 50 mg tablet, , Disp: , Rfl: 0 
  silodosin (RAPAFLO) 8 mg capsule, TAKE ONE CAPSULE BY MOUTH NIGHTLY, Disp: 30 Cap, Rfl: 5 
  oxybutynin (DITROPAN) 5 mg tablet, Take 1 Tab by mouth two (2) times a day., Disp: 60 Tab, Rfl: 5 
  NITROSTAT 0.4 mg SL tablet, PLACE 1 TABLET BY MOUTH UNDER TONGUE EVERY 5 MINUTES AS NEEDED, Disp: 25 Tab, Rfl: 3 
  food supplemt, lactose-reduced (BOOST HIGH PROTEIN) liqd, Take 237 mL by mouth three (3) times daily. , Disp: , Rfl:  
  LACTOBACILLUS RHAMNOSUS GG (PROBIOTIC PO), Take  by mouth two (2) times a day., Disp: , Rfl:   
 
No Known Allergies Patient Active Problem List  
 Diagnosis Date Noted  PAC (premature atrial contraction) 07/29/2014  Chronic anemia 02/05/2014  GERD (gastroesophageal reflux disease) 05/17/2012  Carotid artery disease ([facility] [address] 07/19/2011  Anemia 09/15/2010  Pure hypercholesterolemia 05/12/2010  Esophageal reflux 05/12/2010  BPH (benign prostatic hyperplasia) 05/12/2010  Gastritis 05/12/2010  CAD (coronary artery disease) 05/12/2010  
 Hiatal hernia 05/12/2010 Review of Systems: 
Constitutional: Negative for fever, malaise Derm: see HPI Resp: Negative for cough, wheezing or SOB 
CV: Negative for chest pain, dizziness or palpitations GI: Negative for nausea or abdominal pain MS: Positive for physical debility Neuro: Negative for HA, weakness or paresthesia Assessment/Plan: 
Details of this discussion including any medical advice provided: Patient advised as a precaution to stay at home, practice regular hand washing with soap and warm water and to wear a mask and utilize social distancing when necessary to be out in public places. ICD-10-CM ICD-9-CM 1. Pressure injury of skin of right hip, unspecified injury stage  L89.219 707.04   
  707.20 2. Debility  R53.81 799.3 3. History of fall within past 90 days  Z91.81 V15.88   
4. Bilateral carotid artery disease, unspecified type (HCC)  I77.9 447.9 5. Chronic anemia  D64.9 285.9 Symptomatic therapy suggested: call prn if symptoms persist or worsen. Total Time: minutes: 5-10 minutes was spent on phone discussing above problems and plan. Patient medical history, prior OV notes, vitals flow sheet, lab results, medications, and allergies were reviewed during this encounter. For phone encounters: 
I affirm this is a patient initiated episode with an established Patient who has not had a related appointment within my department in the past 7 days or scheduled within the next 24 hours. Note: not billable if this call serves to triage the patient into an appointment for the relevant concern MD LUIS ANTONIO Banerjee & ANGELO AWAD [facility] 10/02/20

## 2020-10-08 NOTE — TELEPHONE ENCOUNTER
----- Message from Shalom Pastor sent at 10/8/2020  1:09 PM EDT -----  [address] Message/Vendor Calls    Caller's first and last name: [facility]       Reason for call: Death Certificate Signature       Callback required yes/no and why: Yes      Best contact number(s): [phone]      Details to clarify the request:       Shalom Pastor

## 2020-10-09 NOTE — TELEPHONE ENCOUNTER
Call made to Jesse at [facility].  Jesse said that he would send the electronic death certificate over Monday for you to sign

## 2020-10-19 ENCOUNTER — TELEPHONE (OUTPATIENT)
Dept: FAMILY MEDICINE CLINIC | Age: 85
End: 2020-10-19

## 2020-10-19 NOTE — TELEPHONE ENCOUNTER
[facility] [id number]. Mr. Sofia Crespo says the [employer] is on line on the \"EDRS\".

## 2021-07-07 NOTE — PROGRESS NOTES
[address], [address] [place] [address] 
[phone]  
 
 
Progress Note Patient: Cassi Whitehead MRN: [id number]  SSN: [id number] YOB: 1923  Age: 80 y.o. Sex: male Chief Complaint Patient presents with  Weight Loss  Anemia Subjective:  
 
Encounter Diagnoses Name Primary?  Pure hypercholesterolemia: Because of his advanced age I have stopped checking his cholesterol. He is only taking Colestid now. Cardiovascular risks for him are: existing CAD 
hypertension 
hyperlipidemia. Key Antihyperlipidemia Meds   
    
  
 colestipol (COLESTID) 1 gram tablet (Taking) TAKE ONE TABLET BY MOUTH TWICE DAILY Lab Results Component Value Date/Time Cholesterol, total 125 07/25/2018 12:24 PM  
 HDL Cholesterol 39 (L) 07/25/2018 12:24 PM  
 LDL, calculated 58 07/25/2018 12:24 PM  
 Triglyceride 138 07/25/2018 12:24 PM  
 CHOL/HDL Ratio 3.7 09/14/2010 08:42 AM  
 
Lab Results Component Value Date/Time ALT (SGPT) 15 11/20/2018 03:15 PM  
 AST (SGOT) 23 11/20/2018 03:15 PM  
 Alk. phosphatase 118 (H) 11/20/2018 03:15 PM  
 Bilirubin, total 0.4 11/20/2018 03:15 PM  
 
 Myalgias: No 
 Fatigue: No 
 Other side effects: no Wt Readings from Last 3 Encounters:  
04/16/19 106 lb (48.1 kg) 02/14/19 109 lb (49.4 kg) 02/12/19 111 lb 6.4 oz (50.5 kg) The patient is aware of our goal to reduce or eliminate the long term problems (such as strokes and heart attacks) related to poorly controlled hyperlipidemia. Yes  Gastroesophageal reflux disease without esophagitis: 
Current control of Symptoms:good Hiatal Hernia:no Current Medications: No regular medication The patient has no history melena or bright red blood in the stools. The patient avoids high dose aspirin and NSAID therapy. He does take an occasional Aleve.  
The patient is aware of diet changes needed, elevating the head of the bed [FreeTextEntry1] : very complicated history\par \par lengthy discussion today regarding her health\par she had a very good visit with pulmonary and wants to continue care\par feels she needs more time with her PCP, wants weekly visits to discuss updates\par admits that she wakes up in AM with so many questions for me and by afternoon feels no need to talk with me\par was rather irate that i went on vacation\par felt alone though there was a covering provider and an NP available at [facility]\par \par KELSEY\par pulm consult done awaiting PFTs\par very important to patient\par wants to know why she feels acute SOB at times\par \par DM\par A1C downtrending but remains elevated \par son passed recently from pancreatic cancer\par she is clear she does not want any invasive testing\par she understands risks of not being on treatment\par goal is more palliative/symptom based\par \par OA\par had been on prednisone 7.5 mg daily\par now entirely off of prednisone\par but I have tapered\par pain persists\par continue tylenol and celebrex\par but once pulmonary workup is complete may put her back on prednisone 2.5mg daily for palliative reasons\par - goals of care - quality of life\par \par anxiety\par on higher dose paxil\par ultimate goal is to stop clonazepam\par basely using at this time but needs during acute panic attacks\par \par Vit D bolus dosing twice a week\par \par \par discussed with patient that I cannot see her weekly\par she may consider  physician \par I am willing to help her with issues and have been seeing her every 2-3 weeks at assisted living\par for 30-45 mins but she feels that this is not enough\par discussed expectations and that she can look for a new primary\par \par short term goals\par taper ambien \par 2.5mg QHS x 2 weeks then QOD x 2 weeks then stop\par additional 2.5mg dose available if poor sleep but try to avoid\par \par next steps include considering metformin given hx diarrhea I was trying to avoid \par \par \par \par \par seen in assisted living apartment due to frequent falls,  mobility high fall risk\par \par  and appropriate use of antacids. 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 
No chest pain, MATA or SOB. No PND or orthopnea.  Chronic anemia: This is felt to be due to bone marrow decline. He has no evidence of iron deficiency. No sx. Lab Results Component Value Date/Time HGB 10.5 (L) 12/18/2018 04:52 PM  
 
Lab Results Component Value Date/Time Iron 57 04/12/2017 08:45 AM  
 TIBC 287 04/12/2017 08:45 AM  
 Iron % saturation 20 04/12/2017 08:45 AM  
 Ferritin 104 05/17/2013 08:07 AM  
 
   
 
  
 
 
 
Current and past medical information: 
 
Current Medications after this visit[de-identified]    
Current Outpatient Medications Medication Sig  
 silodosin (RAPAFLO) 8 mg capsule TAKE ONE CAPSULE BY MOUTH NIGHTLY  oxybutynin (DITROPAN) 5 mg tablet Take 1 Tab by mouth two (2) times a day.  naproxen (NAPROSYN) 500 mg tablet Take 1 Tab by mouth two (2) times daily (with meals).  NITROSTAT 0.4 mg SL tablet PLACE 1 TABLET BY MOUTH UNDER TONGUE EVERY 5 MINUTES AS NEEDED  colestipol (COLESTID) 1 gram tablet TAKE ONE TABLET BY MOUTH TWICE DAILY  food supplemt, lactose-reduced (BOOST HIGH PROTEIN) liqd Take 237 mL by mouth three (3) times daily.  LACTOBACILLUS RHAMNOSUS GG (PROBIOTIC PO) Take  by mouth two (2) times a day.  Cholecalciferol, Vitamin D3, (VITAMIN D) 1,000 unit Cap Take  by mouth daily.  aspirin delayed-release 81 mg tablet Take 81 mg by mouth daily.  cyanocobalamin (VITAMIN B-12) 1,000 mcg tablet Take 1,000 mcg by mouth daily. No current facility-administered medications for this visit. Patient Active Problem List  
 Diagnosis Date Noted  Chronic anemia 02/05/2014 Priority: 1 - One  GERD (gastroesophageal reflux disease) 05/17/2012 Priority: 1 - One  Carotid artery disease ([facility] [address] 07/19/2011 Priority: 1 - One  Pure hypercholesterolemia 05/12/2010   Priority: 1 - One  
  Esophageal reflux 05/12/2010 Priority: 1 - One  
 CAD (coronary artery disease) 05/12/2010 Priority: 1 - One  
 PAC (premature atrial contraction) 07/29/2014  Anemia 09/15/2010  BPH (benign prostatic hyperplasia) 05/12/2010  Gastritis 05/12/2010  
 Hiatal hernia 05/12/2010 Past Medical History:  
Diagnosis Date  BPH (benign prostatic hyperplasia) 5/12/2010  CAD (coronary artery disease)  Carotid artery disease ([facility] [address] 7/19/2011  Esophageal reflux 5/12/2010  Gastritis 5/12/2010  
 Hiatal hernia 5/12/2010  
 History of melanoma  IBS (irritable bowel syndrome)  Melanoma ([facility] [address] 5/12/2010  Pure hypercholesterolemia 5/12/2010  S/P CABG (coronary artery bypass graft)  Tooth fracture 2/14 No Known Allergies Past Surgical History:  
Procedure Laterality Date  ABDOMEN SURGERY PROC UNLISTED  1990  
 hernia, right inguinal  
 DUPLEX CAROTID BILATERAL  7/2011 Mild bilateral disease  ECHO 2D ADULT  2010  
 normal LV wall motion and ejection fraction, left atrial enlargement, mild aortic regurgitation, mild to moderate mitral regurgitation and mild tricuspid regurgitation. The ejection fraction was 55-60%. Vickey [address] 76 LIMA to LAD, SVG to diagonal, SVG to trifurcation vessel and SVG to obtuse marginal.   
 [address] Left ventricular wall motion is mild hypokinesis of the anterior wall. Ejection Fraction is estimated at 55%. The Coronary Angiography revealed:. LAD 80% stenosis. OM1 80% stenosis. RCA >90% stenosis.  HX HEENT    
 melanoma surgery x2  HX OTHER SURGICAL Treadmill stress test 7/26/12 - walked 6:43, no chest pain, (8.0 METS); stopped for SOB; no ischemic EKG changes, frequent PVC's including in couplets. Also one brief run of NSVT (7 beats) during stage 2.   
 HX OTHER SURGICAL Carotid dopplers 7/12  show 10-49% stenosis bilat.  HX OTHER SURGICAL Exercise cardiolite 8/10/12 - walked 7 min without chest pain; EKG with anteroseptal infarct age undetermined; no ischemic EKG changes; short runs (7 beat) of NSVT during exercise. Normal myocardial perfusion and function. LVEF 69%  HX OTHER SURGICAL Carotid duplex 6/20/14 - mild 10-49% stenosis bilat  STRESS TEST - GXT  7/2011 WNL Social History Socioeconomic History  Marital status:  Spouse name: Not on file  Number of children: Not on file  Years of education: Not on file  Highest education level: Not on file Tobacco Use  Smoking status: Never Smoker  Smokeless tobacco: Never Used Substance and Sexual Activity  Alcohol use: Yes Alcohol/week: 0.0 oz  
  Comment: Occasionally  Drug use: No  
 
 
Review of Systems Constitutional: Positive for weight loss. Negative for chills, fever and malaise/fatigue. He continues to live at home with the assistance of his son. They also have help 4 days a week. His weight is down however both he and his son report that he is eating well. Last thyroid testing was normal.  CMP and CBC today. HENT: Negative. Negative for hearing loss. Eyes: Negative. Respiratory: Negative. Negative for cough, sputum production and shortness of breath. Cardiovascular: Negative. Negative for chest pain, palpitations and orthopnea. Gastrointestinal: Negative. Negative for abdominal pain, blood in stool, heartburn, nausea and vomiting. Genitourinary: Negative. Negative for dysuria, frequency and urgency. Musculoskeletal: Negative. Negative for back pain, myalgias and neck pain. He has had one fall since last visit. His son said he bruised his right buttocks but it promptly got better. He does not complain of any joint pain today. Skin: Negative. Negative for rash. Neurological: Negative. Negative for dizziness, tingling, tremors, weakness and headaches. Endo/Heme/Allergies: Negative. Psychiatric/Behavioral: Negative. Negative for depression. Mentally he is sharp and very talkative. This is a great improvement from when he first fell before his son started staying with him. Objective:  
 
Vitals:  
 04/16/19 1436 04/16/19 1507 BP: 165/68 137/67 Pulse: 85 Resp: 18 Temp: 97.5 °F (36.4 °C) TempSrc: Oral   
SpO2: 97% Weight: 106 lb (48.1 kg) Height: 5' 7\" (1.702 m) Body mass index is 16.6 kg/m². Physical Exam  
Constitutional: He is oriented to person, place, and time and well-developed, well-nourished, and in no distress. HENT:  
Head: Normocephalic and atraumatic. Mouth/Throat: Oropharynx is clear and moist.  
Eyes: Right eye exhibits no discharge. Left eye exhibits no discharge. No scleral icterus. Neck: No thyromegaly present. No bruit. Cardiovascular: Normal rate, regular rhythm and normal heart sounds. Exam reveals no friction rub. No murmur heard. Pulmonary/Chest: Effort normal and breath sounds normal. No respiratory distress. He has no wheezes. Abdominal: Soft. He exhibits no distension. There is no tenderness. There is no rebound. Musculoskeletal: He exhibits no edema. Neurological: He is alert and oriented to person, place, and time. Skin: No rash noted. No erythema. Psychiatric: Mood and affect normal.  
Nursing note and vitals reviewed. Health Maintenance Due Topic Date Due  MEDICARE YEARLY EXAM  04/18/2019 Assessment and orders:  
 
Encounter Diagnoses ICD-10-CM ICD-9-CM 1. Pure hypercholesterolemia E78.00 272.0  
2. Gastroesophageal reflux disease without esophagitis K21.9 530.81  
3. Coronary artery disease involving native coronary artery of native heart without angina pectoris I25.10 414.01  
4. Chronic anemia D64.9 285.9 Diagnoses and all orders for this visit: 1. Pure hypercholesterolemia-retest labs -     METABOLIC PANEL, COMPREHENSIVE 
 
 2. Gastroesophageal reflux disease without esophagitis-retest blood count. -     CBC WITH AUTOMATED DIFF; Future 3. Coronary artery disease involving native coronary artery of native heart without angina pectoris- 
 
4. Chronic anemia test anemia 
-     CBC WITH AUTOMATED DIFF; Future Continue Boost or Ensure. Choose the flavor he likes best.  His son reports he is snacking a lot with chips pecan swirls etc.  He really is surprising that he is not gaining weight. Since he is doing so well otherwise we will see him back in 3 months. Plan of care: 
Discussed diagnoses in detail with patient. Medication risks/benefits/side effects discussed with patient. All of the patient's questions were addressed. The patient understands and agrees with our plan of care. The patient knows to call back if they are unsure of or forget any changes we discussed today or if the symptoms change. The patient received an After-Visit Summary which contains VS, orders, medication list and allergy list. This can be used as a \"mini-medical record\" should they have to seek medical care while out of town. Patient Care Team: 
Karla Gunn MD as PCP - General 
Lawrence Light MD as Physician (Dermatology) Morgan Nicole MD as Physician (Gastroenterology) Surendra Gibbons MD (Cardiology) Tavon Niño MD (Urology) Ortega Neumann RN as Ambulatory Care Navigator Follow-up and Dispositions · Return in about 3 months (around 7/16/2019). Future Appointments Date Time Provider Taisha Aguero 7/16/2019  1:05 PM Karla Gunn MD [facility] SCHED  
8/22/2019  1:20 PM Cookie Gibbons MD [address] [place] [address] Signed By: Karen Tabares MD   
 April 16, 2019 This note was created using voice recognition software. Despite editing, there may be syntax errors.